# Patient Record
Sex: FEMALE | Race: WHITE | Employment: OTHER | URBAN - METROPOLITAN AREA
[De-identification: names, ages, dates, MRNs, and addresses within clinical notes are randomized per-mention and may not be internally consistent; named-entity substitution may affect disease eponyms.]

---

## 2021-09-01 ENCOUNTER — HOSPITAL ENCOUNTER (OUTPATIENT)
Dept: GENERAL RADIOLOGY | Age: 71
Discharge: HOME | End: 2021-09-01
Attending: FAMILY MEDICINE
Payer: COMMERCIAL

## 2021-09-01 ENCOUNTER — OFFICE VISIT (OUTPATIENT)
Dept: FAMILY MEDICINE CLINIC | Facility: CLINIC | Age: 71
End: 2021-09-01
Payer: COMMERCIAL

## 2021-09-01 VITALS
HEART RATE: 83 BPM | DIASTOLIC BLOOD PRESSURE: 84 MMHG | WEIGHT: 158.63 LBS | OXYGEN SATURATION: 98 % | SYSTOLIC BLOOD PRESSURE: 130 MMHG

## 2021-09-01 DIAGNOSIS — Z01.818 PRE-OPERATIVE GENERAL PHYSICAL EXAMINATION: ICD-10-CM

## 2021-09-01 DIAGNOSIS — Z00.00 ROUTINE MEDICAL EXAM: Primary | ICD-10-CM

## 2021-09-01 DIAGNOSIS — Z12.31 BREAST CANCER SCREENING BY MAMMOGRAM: ICD-10-CM

## 2021-09-01 DIAGNOSIS — E78.5 DYSLIPIDEMIA: ICD-10-CM

## 2021-09-01 DIAGNOSIS — Z12.39 BREAST CANCER SCREENING OTHER THAN MAMMOGRAM: ICD-10-CM

## 2021-09-01 DIAGNOSIS — Z80.3 FAMILY HISTORY OF BREAST CANCER: ICD-10-CM

## 2021-09-01 DIAGNOSIS — Z12.11 SCREENING FOR COLON CANCER: ICD-10-CM

## 2021-09-01 DIAGNOSIS — K21.9 GASTROESOPHAGEAL REFLUX DISEASE WITHOUT ESOPHAGITIS: ICD-10-CM

## 2021-09-01 DIAGNOSIS — Z79.890 HORMONE REPLACEMENT THERAPY (HRT): ICD-10-CM

## 2021-09-01 DIAGNOSIS — R23.8 INTRINSIC AGING OF FACIAL SKIN: ICD-10-CM

## 2021-09-01 DIAGNOSIS — Z78.0 ASYMPTOMATIC MENOPAUSE: ICD-10-CM

## 2021-09-01 DIAGNOSIS — R92.2 DENSE BREAST: ICD-10-CM

## 2021-09-01 DIAGNOSIS — Z13.820 SCREENING FOR OSTEOPOROSIS: ICD-10-CM

## 2021-09-01 DIAGNOSIS — K64.8 OTHER HEMORRHOIDS: ICD-10-CM

## 2021-09-01 DIAGNOSIS — Z79.899 LONG-TERM USE OF HIGH-RISK MEDICATION: ICD-10-CM

## 2021-09-01 LAB
BILIRUB UR QL: NEGATIVE
COLOR UR: YELLOW
GLUCOSE UR-MCNC: NEGATIVE MG/DL
HYALINE CASTS #/AREA URNS AUTO: PRESENT /LPF
KETONES UR-MCNC: NEGATIVE MG/DL
NITRITE UR QL STRIP.AUTO: NEGATIVE
PH UR: 5 [PH] (ref 5–8)
PROT UR-MCNC: 100 MG/DL
RBC #/AREA URNS AUTO: >10 /HPF
SP GR UR STRIP: >1.03 (ref 1–1.03)
UROBILINOGEN UR STRIP-ACNC: <2

## 2021-09-01 PROCEDURE — 87147 CULTURE TYPE IMMUNOLOGIC: CPT | Performed by: FAMILY MEDICINE

## 2021-09-01 PROCEDURE — G0439 PPPS, SUBSEQ VISIT: HCPCS | Performed by: FAMILY MEDICINE

## 2021-09-01 PROCEDURE — 99204 OFFICE O/P NEW MOD 45 MIN: CPT | Performed by: FAMILY MEDICINE

## 2021-09-01 PROCEDURE — 3075F SYST BP GE 130 - 139MM HG: CPT | Performed by: FAMILY MEDICINE

## 2021-09-01 PROCEDURE — 71046 X-RAY EXAM CHEST 2 VIEWS: CPT | Performed by: FAMILY MEDICINE

## 2021-09-01 PROCEDURE — 3079F DIAST BP 80-89 MM HG: CPT | Performed by: FAMILY MEDICINE

## 2021-09-01 PROCEDURE — 87086 URINE CULTURE/COLONY COUNT: CPT | Performed by: FAMILY MEDICINE

## 2021-09-01 PROCEDURE — 81001 URINALYSIS AUTO W/SCOPE: CPT | Performed by: FAMILY MEDICINE

## 2021-09-01 RX ORDER — PANTOPRAZOLE SODIUM 40 MG/1
40 TABLET, DELAYED RELEASE ORAL DAILY
COMMUNITY
Start: 2021-08-23

## 2021-09-01 RX ORDER — ESTRADIOL 0.5 MG/1
0.5 TABLET ORAL DAILY
COMMUNITY
Start: 2021-06-16 | End: 2021-10-06 | Stop reason: ALTCHOICE

## 2021-09-02 NOTE — PROGRESS NOTES
Braulio Forman,    Attached are the results of your recently performed labs/tests:    Noted were:  1. Normal heart and lungs. No acute disease. 2. Atherosclerosis of the aorta. 3. Kyphoscoliosis. 4. Demineralization. 5. Osteoarthritis.    6. Chronic

## 2021-09-03 NOTE — PROGRESS NOTES
Hi Billy Dandy,    Attached are the results of your recently performed labs/tests:    Your urine tests including urine culture came back with an infection. I sent in an antibiotic to your pharmacy. Please: recheck the urine tests in 2 weeks.  If still

## 2021-09-05 NOTE — H&P
HPI:   Patient presents with:  Physical: w/ BE   Pre-Op Exam  Hemorrhoids  Gastro-esophageal Reflux      Conor Bradshaw is a 72year old female who presents for an Annual and  pre-operative physical exam.    Patient will have cosmetic surgery (neck left) w ear pain  MOUTH/THROAT:  No sore throat or dental problems, no oral lesions  HEART:  No chest pain or palpitations  LUNG:  No SOB, cough or wheeze  GI:  No abdominal pain.   No N/V/D/C  :  No dysuria  MS:  No new joint pain or swelling B  NEURO:  no numbn surgery.     Will check:  CBC, CMP, PT/PT  U/A/micro with: pos for infection, antibiotics prescribed, rechk urine clx in 2 weeks-f/u P  EKG  CXR    Patient is an acceptable surgical candidate and is medically cleared for surgery as long as the above tests a or remains reluctant to stop the hormone replacement in the next 2 to 3 months. now- estradiol 0.5 MG Oral Tab; Take 0.5 mg by mouth daily. 10. Long-term use of high-risk medication  SA  - estradiol 0.5 MG Oral Tab; Take 0.5 mg by mouth daily.     11. (CPT=77080)  [facility] CAROLANN 2D+3D SCREENING BILAT (CPT=77067/40832)  US BREAST BILATERAL COMPLETE (QAS=44966-19)  EKG 12-LEAD    Return in about 1 year (around 9/1/2022) for CPE and sooner prn hemorrhoids and GERD.     Susan Pittman MD

## 2021-09-07 ENCOUNTER — TELEPHONE (OUTPATIENT)
Dept: FAMILY MEDICINE CLINIC | Facility: CLINIC | Age: 71
End: 2021-09-07

## 2021-09-07 ENCOUNTER — MED REC SCAN ONLY (OUTPATIENT)
Dept: FAMILY MEDICINE CLINIC | Facility: CLINIC | Age: 71
End: 2021-09-07

## 2021-09-07 NOTE — TELEPHONE ENCOUNTER
Received vm from pt, returning Jane Samuels' call re test results. I spoke with pt, reviewed and discussed urine culture results. Pt is asymptomatic. Said she had a death in the family and is currently in [place].  She did call a provider out there who is sen

## 2021-09-15 ENCOUNTER — HOSPITAL ENCOUNTER (OUTPATIENT)
Dept: BONE DENSITY | Age: 71
Discharge: HOME | End: 2021-09-15
Attending: FAMILY MEDICINE
Payer: COMMERCIAL

## 2021-09-15 ENCOUNTER — HOSPITAL ENCOUNTER (OUTPATIENT)
Dept: MAMMOGRAPHY | Age: 71
Discharge: HOME | End: 2021-09-15
Attending: FAMILY MEDICINE
Payer: COMMERCIAL

## 2021-09-15 DIAGNOSIS — Z78.0 ASYMPTOMATIC MENOPAUSE: ICD-10-CM

## 2021-09-15 DIAGNOSIS — Z12.31 BREAST CANCER SCREENING BY MAMMOGRAM: ICD-10-CM

## 2021-09-15 DIAGNOSIS — Z13.820 SCREENING FOR OSTEOPOROSIS: ICD-10-CM

## 2021-09-15 PROCEDURE — 77063 BREAST TOMOSYNTHESIS BI: CPT | Performed by: FAMILY MEDICINE

## 2021-09-15 PROCEDURE — 77067 SCR MAMMO BI INCL CAD: CPT | Performed by: FAMILY MEDICINE

## 2021-09-15 PROCEDURE — 77080 DXA BONE DENSITY AXIAL: CPT | Performed by: FAMILY MEDICINE

## 2021-09-18 ENCOUNTER — EKG ENCOUNTER (OUTPATIENT)
Dept: LAB | Facility: HOSPITAL | Age: 71
End: 2021-09-18
Attending: FAMILY MEDICINE
Payer: COMMERCIAL

## 2021-09-18 DIAGNOSIS — E78.5 DYSLIPIDEMIA: ICD-10-CM

## 2021-09-18 DIAGNOSIS — Z01.818 PRE-OPERATIVE GENERAL PHYSICAL EXAMINATION: ICD-10-CM

## 2021-09-18 DIAGNOSIS — Z00.00 ROUTINE MEDICAL EXAM: ICD-10-CM

## 2021-09-18 DIAGNOSIS — N30.01 ACUTE CYSTITIS WITH HEMATURIA: ICD-10-CM

## 2021-09-18 LAB
ALBUMIN SERPL-MCNC: 3.9 G/DL (ref 3.4–5)
ALBUMIN/GLOB SERPL: 1 {RATIO} (ref 1–2)
ALP LIVER SERPL-CCNC: 76 U/L
ALT SERPL-CCNC: 17 U/L
ANION GAP SERPL CALC-SCNC: 2 MMOL/L (ref 0–18)
APTT PPP: 28.5 SECONDS (ref 23.2–35.3)
AST SERPL-CCNC: 14 U/L (ref 15–37)
BASOPHILS # BLD AUTO: 0.04 X10(3) UL (ref 0–0.2)
BASOPHILS NFR BLD AUTO: 0.7 %
BILIRUB SERPL-MCNC: 0.8 MG/DL (ref 0.1–2)
BILIRUB UR QL: NEGATIVE
BUN BLD-MCNC: 14 MG/DL (ref 7–18)
BUN/CREAT SERPL: 15.4 (ref 10–20)
CALCIUM BLD-MCNC: 9.4 MG/DL (ref 8.5–10.1)
CHLORIDE SERPL-SCNC: 109 MMOL/L (ref 98–112)
CHOLEST SERPL-MCNC: 219 MG/DL (ref ?–200)
CO2 SERPL-SCNC: 28 MMOL/L (ref 21–32)
COLOR UR: YELLOW
CREAT BLD-MCNC: 0.91 MG/DL
DEPRECATED RDW RBC AUTO: 41.5 FL (ref 35.1–46.3)
EOSINOPHIL # BLD AUTO: 0.07 X10(3) UL (ref 0–0.7)
EOSINOPHIL NFR BLD AUTO: 1.2 %
ERYTHROCYTE [DISTWIDTH] IN BLOOD BY AUTOMATED COUNT: 12.1 % (ref 11–15)
GLOBULIN PLAS-MCNC: 3.9 G/DL (ref 2.8–4.4)
GLUCOSE BLD-MCNC: 91 MG/DL (ref 70–99)
GLUCOSE UR-MCNC: NEGATIVE MG/DL
GRAN CASTS #/AREA URNS LPF: PRESENT /LPF
HCT VFR BLD AUTO: 39.1 %
HDLC SERPL-MCNC: 59 MG/DL (ref 40–59)
HGB BLD-MCNC: 12.7 G/DL
HYALINE CASTS #/AREA URNS AUTO: PRESENT /LPF
IMM GRANULOCYTES # BLD AUTO: 0.01 X10(3) UL (ref 0–1)
IMM GRANULOCYTES NFR BLD: 0.2 %
INR BLD: 1.06 (ref 0.9–1.2)
KETONES UR-MCNC: NEGATIVE MG/DL
LDLC SERPL CALC-MCNC: 138 MG/DL (ref ?–100)
LYMPHOCYTES # BLD AUTO: 1.15 X10(3) UL (ref 1–4)
LYMPHOCYTES NFR BLD AUTO: 19.7 %
MCH RBC QN AUTO: 30.3 PG (ref 26–34)
MCHC RBC AUTO-ENTMCNC: 32.5 G/DL (ref 31–37)
MCV RBC AUTO: 93.3 FL
MONOCYTES # BLD AUTO: 0.35 X10(3) UL (ref 0.1–1)
MONOCYTES NFR BLD AUTO: 6 %
NEUTROPHILS # BLD AUTO: 4.23 X10 (3) UL (ref 1.5–7.7)
NEUTROPHILS # BLD AUTO: 4.23 X10(3) UL (ref 1.5–7.7)
NEUTROPHILS NFR BLD AUTO: 72.2 %
NITRITE UR QL STRIP.AUTO: NEGATIVE
NONHDLC SERPL-MCNC: 160 MG/DL (ref ?–130)
OSMOLALITY SERPL CALC.SUM OF ELEC: 288 MOSM/KG (ref 275–295)
PATIENT FASTING Y/N/NP: YES
PATIENT FASTING Y/N/NP: YES
PH UR: 5 [PH] (ref 5–8)
PLATELET # BLD AUTO: 294 10(3)UL (ref 150–450)
POTASSIUM SERPL-SCNC: 4.4 MMOL/L (ref 3.5–5.1)
PROT SERPL-MCNC: 7.8 G/DL (ref 6.4–8.2)
PROT UR-MCNC: 30 MG/DL
PROTHROMBIN TIME: 13.6 SECONDS (ref 11.8–14.5)
RBC # BLD AUTO: 4.19 X10(6)UL
RBC #/AREA URNS AUTO: >10 /HPF
SODIUM SERPL-SCNC: 139 MMOL/L (ref 136–145)
SP GR UR STRIP: 1.03 (ref 1–1.03)
TRIGL SERPL-MCNC: 122 MG/DL (ref 30–149)
TSI SER-ACNC: 1.53 MIU/ML (ref 0.36–3.74)
UROBILINOGEN UR STRIP-ACNC: 2
VLDLC SERPL CALC-MCNC: 22 MG/DL (ref 0–30)
WBC # BLD AUTO: 5.9 X10(3) UL (ref 4–11)

## 2021-09-18 PROCEDURE — 87186 SC STD MICRODIL/AGAR DIL: CPT

## 2021-09-18 PROCEDURE — 85025 COMPLETE CBC W/AUTO DIFF WBC: CPT

## 2021-09-18 PROCEDURE — 93005 ELECTROCARDIOGRAM TRACING: CPT

## 2021-09-18 PROCEDURE — 87086 URINE CULTURE/COLONY COUNT: CPT

## 2021-09-18 PROCEDURE — 93010 ELECTROCARDIOGRAM REPORT: CPT | Performed by: FAMILY MEDICINE

## 2021-09-18 PROCEDURE — 80053 COMPREHEN METABOLIC PANEL: CPT

## 2021-09-18 PROCEDURE — 36415 COLL VENOUS BLD VENIPUNCTURE: CPT

## 2021-09-18 PROCEDURE — 80061 LIPID PANEL: CPT

## 2021-09-18 PROCEDURE — 85730 THROMBOPLASTIN TIME PARTIAL: CPT

## 2021-09-18 PROCEDURE — 84443 ASSAY THYROID STIM HORMONE: CPT

## 2021-09-18 PROCEDURE — 87077 CULTURE AEROBIC IDENTIFY: CPT

## 2021-09-18 PROCEDURE — 81001 URINALYSIS AUTO W/SCOPE: CPT

## 2021-09-18 PROCEDURE — 85610 PROTHROMBIN TIME: CPT

## 2021-09-21 NOTE — PROGRESS NOTES
Braulio Morse,    Attached are the results of your recently performed labs/tests: All results are essentially within NORMAL limits. EXCEPT:    Your urine tests show a persistent urinary tract infection (E.Coli). I sent in antibiotic for this.  [place]

## 2021-09-24 ENCOUNTER — HOSPITAL ENCOUNTER (OUTPATIENT)
Dept: ULTRASOUND IMAGING | Facility: HOSPITAL | Age: 71
Discharge: HOME | End: 2021-09-24
Attending: FAMILY MEDICINE
Payer: COMMERCIAL

## 2021-09-24 ENCOUNTER — HOSPITAL ENCOUNTER (OUTPATIENT)
Dept: MAMMOGRAPHY | Facility: HOSPITAL | Age: 71
Discharge: HOME | End: 2021-09-24
Attending: FAMILY MEDICINE
Payer: COMMERCIAL

## 2021-09-24 DIAGNOSIS — R92.8 ABNORMAL MAMMOGRAM: ICD-10-CM

## 2021-09-24 PROCEDURE — 76642 ULTRASOUND BREAST LIMITED: CPT | Performed by: FAMILY MEDICINE

## 2021-09-24 PROCEDURE — 77065 DX MAMMO INCL CAD UNI: CPT | Performed by: FAMILY MEDICINE

## 2021-09-24 PROCEDURE — 77061 BREAST TOMOSYNTHESIS UNI: CPT | Performed by: FAMILY MEDICINE

## 2021-09-24 NOTE — IMAGING NOTE
This nurse introduced self and role of breast coordinator. Discussed recommended breast biopsy with patient. Pt was recommended by Dr. Sheri Lea  to have a left  breast ultrasound guided biopsy.  Hx breast reduction with implants in 2018 Had implants with aspect.  There is no detectable internal blood flow.       Limited ultrasound was performed of the left axilla.  There is no visible lymphadenopathy.              Impression   CONCLUSION:     Mass in the upper central left breast with associated architectu Vitamin E, Fish Oil  green tea ,all nsaids like   ibuprofen, Motrin, Advil, Aleve, or other anti-inflammatory medication)   and aspirin 81 mg to be held for 5 days prior to biopsy.   She  Denies usage     -Aspirin (325mg) or 81 mg  aspirin  being taken rela soreness. Tylenol only for discomfort unless they have an allergy to tylenol . Discussed with patient no swimming, bathing,  hot tubs or submerging underwater  for 5 days post procedure until the incision is closed and healed.    Educated patient on [address]

## 2021-09-26 ENCOUNTER — PATIENT MESSAGE (OUTPATIENT)
Dept: FAMILY MEDICINE CLINIC | Facility: CLINIC | Age: 71
End: 2021-09-26

## 2021-09-26 DIAGNOSIS — N63.0 BREAST MASS SEEN ON MAMMOGRAM: Primary | ICD-10-CM

## 2021-09-27 ENCOUNTER — PATIENT MESSAGE (OUTPATIENT)
Dept: FAMILY MEDICINE CLINIC | Facility: CLINIC | Age: 71
End: 2021-09-27

## 2021-09-27 ENCOUNTER — MED REC SCAN ONLY (OUTPATIENT)
Dept: FAMILY MEDICINE CLINIC | Facility: CLINIC | Age: 71
End: 2021-09-27

## 2021-09-27 NOTE — TELEPHONE ENCOUNTER
RECOMMENDATIONS:   ULTRASOUND-GUIDED CORE BIOPSY: LEFT BREAST x1     JEFFERSON CAROLANN 2D+3D DIAGNOSTIC ADDL VWS LEFT (CPT=77065/16558): Result Notes     Magaly Arauz MD   9/26/2021  4:55 PM CDT         Braulio Cooper,      I reviewed your Mammogram resul

## 2021-09-29 ENCOUNTER — HOSPITAL ENCOUNTER (OUTPATIENT)
Dept: ULTRASOUND IMAGING | Facility: HOSPITAL | Age: 71
Discharge: HOME | End: 2021-09-29
Attending: FAMILY MEDICINE
Payer: COMMERCIAL

## 2021-09-29 ENCOUNTER — HOSPITAL ENCOUNTER (OUTPATIENT)
Dept: MAMMOGRAPHY | Facility: HOSPITAL | Age: 71
Discharge: HOME | End: 2021-09-29
Attending: FAMILY MEDICINE
Payer: COMMERCIAL

## 2021-09-29 DIAGNOSIS — N63.20 MASS OF LEFT BREAST: ICD-10-CM

## 2021-09-29 DIAGNOSIS — N63.0 BREAST MASS SEEN ON MAMMOGRAM: ICD-10-CM

## 2021-09-29 PROCEDURE — 19083 BX BREAST 1ST LESION US IMAG: CPT | Performed by: FAMILY MEDICINE

## 2021-09-29 PROCEDURE — 77065 DX MAMMO INCL CAD UNI: CPT | Performed by: FAMILY MEDICINE

## 2021-09-29 PROCEDURE — 88305 TISSUE EXAM BY PATHOLOGIST: CPT | Performed by: FAMILY MEDICINE

## 2021-09-29 PROCEDURE — 88360 TUMOR IMMUNOHISTOCHEM/MANUAL: CPT | Performed by: FAMILY MEDICINE

## 2021-09-29 NOTE — PROCEDURES
[facility]  Procedure Note    Carri Mini Patient Status:  Outpatient    1950 MRN [id number]   Location [address] Attending [place] [facility] Day # 0 PCP Zamzam Ascencio,

## 2021-09-30 ENCOUNTER — TELEPHONE (OUTPATIENT)
Dept: FAMILY MEDICINE CLINIC | Facility: CLINIC | Age: 71
End: 2021-09-30

## 2021-09-30 ENCOUNTER — TELEPHONE (OUTPATIENT)
Dept: HEMATOLOGY/ONCOLOGY | Facility: HOSPITAL | Age: 71
End: 2021-09-30

## 2021-09-30 DIAGNOSIS — C50.919 INFILTRATING DUCTAL CARCINOMA (HCC): Primary | ICD-10-CM

## 2021-09-30 NOTE — TELEPHONE ENCOUNTER
Notified patient of positive breast biopsy results for infiltrating ductal carcinoma. Will place referrals for breast surgeon and oncologist and notify patient via [facility] message. All questions were answered. Will forward this to Dr. Alee Correa as Lexie Sensor.

## 2021-09-30 NOTE — TELEPHONE ENCOUNTER
Patient is s/p left breast biopsy, performed 9/29/21.   Pathology is as follows:    Left breast, 12 o'clock, 5 cm from nipple, cores x5; ultrasound-guided core biopsy:   · Infiltrating ductal carcinoma, SBR grade 2.  · Largest continuous tumor focus measure

## 2021-10-04 ENCOUNTER — LAB ENCOUNTER (OUTPATIENT)
Dept: LAB | Facility: HOSPITAL | Age: 71
End: 2021-10-04
Attending: FAMILY MEDICINE
Payer: COMMERCIAL

## 2021-10-04 DIAGNOSIS — N39.0 URINARY TRACT INFECTION WITHOUT HEMATURIA, SITE UNSPECIFIED: ICD-10-CM

## 2021-10-04 PROCEDURE — 81001 URINALYSIS AUTO W/SCOPE: CPT

## 2021-10-05 NOTE — PROGRESS NOTES
Braulio Larson,    Attached are the results of your recently performed labs/tests:    Your urinalysis has improved. There is only a small amount of blood noted. Please:   Recheck as below: The urine test in one month. Follow up:  Pending the results.

## 2021-10-06 ENCOUNTER — OFFICE VISIT (OUTPATIENT)
Dept: HEMATOLOGY/ONCOLOGY | Facility: HOSPITAL | Age: 71
End: 2021-10-06
Attending: INTERNAL MEDICINE
Payer: COMMERCIAL

## 2021-10-06 ENCOUNTER — NURSE NAVIGATOR ENCOUNTER (OUTPATIENT)
Dept: HEMATOLOGY/ONCOLOGY | Facility: HOSPITAL | Age: 71
End: 2021-10-06

## 2021-10-06 ENCOUNTER — OFFICE VISIT (OUTPATIENT)
Dept: SURGERY | Facility: CLINIC | Age: 71
End: 2021-10-06
Payer: COMMERCIAL

## 2021-10-06 VITALS
BODY MASS INDEX: 26.16 KG/M2 | WEIGHT: 157 LBS | OXYGEN SATURATION: 97 % | TEMPERATURE: 99 F | DIASTOLIC BLOOD PRESSURE: 78 MMHG | RESPIRATION RATE: 16 BRPM | SYSTOLIC BLOOD PRESSURE: 120 MMHG | HEART RATE: 69 BPM | HEIGHT: 65 IN

## 2021-10-06 VITALS
HEIGHT: 65 IN | DIASTOLIC BLOOD PRESSURE: 78 MMHG | WEIGHT: 157 LBS | TEMPERATURE: 99 F | HEART RATE: 69 BPM | BODY MASS INDEX: 26.16 KG/M2 | SYSTOLIC BLOOD PRESSURE: 120 MMHG | RESPIRATION RATE: 16 BRPM | OXYGEN SATURATION: 97 %

## 2021-10-06 DIAGNOSIS — M85.80 OSTEOPENIA, UNSPECIFIED LOCATION: ICD-10-CM

## 2021-10-06 DIAGNOSIS — Z17.0 MALIGNANT NEOPLASM OF CENTRAL PORTION OF LEFT BREAST IN FEMALE, ESTROGEN RECEPTOR POSITIVE (HCC): Primary | ICD-10-CM

## 2021-10-06 DIAGNOSIS — C50.112 MALIGNANT NEOPLASM OF CENTRAL PORTION OF LEFT BREAST IN FEMALE, ESTROGEN RECEPTOR POSITIVE (HCC): Primary | ICD-10-CM

## 2021-10-06 PROCEDURE — 3074F SYST BP LT 130 MM HG: CPT | Performed by: INTERNAL MEDICINE

## 2021-10-06 PROCEDURE — 3078F DIAST BP <80 MM HG: CPT | Performed by: INTERNAL MEDICINE

## 2021-10-06 PROCEDURE — 3008F BODY MASS INDEX DOCD: CPT | Performed by: INTERNAL MEDICINE

## 2021-10-06 PROCEDURE — 3008F BODY MASS INDEX DOCD: CPT | Performed by: SURGERY

## 2021-10-06 PROCEDURE — 3074F SYST BP LT 130 MM HG: CPT | Performed by: SURGERY

## 2021-10-06 PROCEDURE — 99205 OFFICE O/P NEW HI 60 MIN: CPT | Performed by: SURGERY

## 2021-10-06 PROCEDURE — 3078F DIAST BP <80 MM HG: CPT | Performed by: SURGERY

## 2021-10-06 PROCEDURE — 99245 OFF/OP CONSLTJ NEW/EST HI 55: CPT | Performed by: INTERNAL MEDICINE

## 2021-10-06 NOTE — PATIENT INSTRUCTIONS
Dr. Pozo Rad  Tel: [phone]  Fax: [phone]  [address] [facility] [address] [place], [place] [address] [phone]     Surgery/Procedure: Left (possible bilateral) nipple verses skin sparing mastectomy, left lymphoscintigraphy the earliest this can be done is 72 hours prior to surgery. 10. The Pre-Admission Testing Department will call the day before to confirm your procedure, give you the time you need to arrive by and directions on where to go.  They begin making calls after 2

## 2021-10-06 NOTE — PROGRESS NOTES
Patient presents to the [facility] for consultation with both Dr. Armando Norris and Dr. Archie Jimenez. Patient is accompanied by her spouse Sherry Horn for support. Introduced myself to patient and her spouse as Breast RN Navigator.   Shared my role to provide support and

## 2021-10-06 NOTE — CONSULTS
SERGIO Keating is a 72year old female here at the request of Maria Fernanda Perez MD for evaluation of Malignant neoplasm of central portion of left breast in female, estrogen receptor positive (hcc)  (primary encounter diagnosis)    Patient pres cancer:  postmenopausal  Menses age 16. Menopause at age 55, natural menopause.  first at age 24   Breast fed 4 months cumulative remote. OCP use in the past:  Yes. Cumulative number of years: On and off for 10 yrs.   HRT use:  Yes, phytoestrogen File    Past Medical History:   Diagnosis Date   • Cervical dysplasia 1993     Past Surgical History:   Procedure Laterality Date   • CONIZATION CERVIX,KNIFE/LASER  1993   • JEFFERSON LOCALIZATION WIRE 1 SITE LEFT (CPT=19281)  1997   • OTHER SURGICAL HISTORY Malignant neoplasm of central portion of left breast in female, estrogen receptor positive ([facility] [address]  (primary encounter diagnosis)     Cancer Staging  Malignant neoplasm of central portion of left breast in female, estrogen receptor positive ([address]  Staging her risk of recurrence. Baseline DEXA in Sept 2021 with osteopenia. Vitamin D levels ordered. Will have repeat DEXA in Sept 2023. All of the patient's questions were answered to the best of my abilities.      Case will be presented at [place] [address] [facility]                                                                       Pathologist:           Sita Saul MD                                                        Specimen:    Breast, left, l ASCO-CAP Guideline (2020). Final Diagnosis:--      Left breast, 12 o'clock, 5 cm from nipple, cores x5; ultrasound-guided core biopsy:   · Infiltrating ductal carcinoma, SBR grade 2.  · Largest continuous tumor focus measures 9 mm.      Comment:  For quali monoclonal, clone CB11) status: 1+  (Negative)  Ki-67 (Leica, monoclonal, clone MM1) status: 9% (Favorable)        ASCO/CAP Scoring Criteria:  ER/PgR:    > 1% (Positive), Intensity of staining (weak, moderate, strong)  <1% (Negative) Internal control prese ribbon clip. \" The specimen consists of five fibrofatty tissue cores measuring in aggregate 1.5 x 0.6 x 0.2 cm. The specimen is submitted entirely in cassette A1. The specimen was collected and processed on 9/29/21.  The specimen was obtained from the [facility] the upper central left breast with ill-defined margins. There is associated architectural distortion. This mass is located about 5 cm from the nipple on the full field mediolateral view.   This mass is new from    the 2017 outside screening mammogram, whi received a discharge summary from the technologist after completion of exam.      Breast marker legend used on images    Triangle = Palpable lump   [place] = Skin tag or mole   BB = Nipple   Linear melony = Scar   Square = Pain            Dictated by (CST): B COMPOSITION:   Category b-Scattered areas fibroglandular density. FINDINGS: Bilateral silicone implants are noted.   There is a mass in the upper-outer quadrant of the left breast.  Dedicated left breast imaging is recommended for complete evaluatio Measurement of bone mineral density of the lumbar spine and hip was performed on a Hologic dual energy x-ray absorptiometry scanner.       FINDINGS:      LEFT FEMORAL NECK   BMD: 0.597 gm/sq. cm. T SCORE: -2.3 Z SCORE: -0.5      LEFT TOTAL HIP   BMD: 0.840 9/16/2021 at 4:22 PM

## 2021-10-07 ENCOUNTER — GENETICS ENCOUNTER (OUTPATIENT)
Dept: GENETICS | Facility: HOSPITAL | Age: 71
End: 2021-10-07
Payer: COMMERCIAL

## 2021-10-07 ENCOUNTER — NURSE ONLY (OUTPATIENT)
Dept: HEMATOLOGY/ONCOLOGY | Facility: HOSPITAL | Age: 71
End: 2021-10-07
Payer: COMMERCIAL

## 2021-10-07 DIAGNOSIS — Z17.0 MALIGNANT NEOPLASM OF CENTRAL PORTION OF LEFT BREAST IN FEMALE, ESTROGEN RECEPTOR POSITIVE (HCC): Primary | ICD-10-CM

## 2021-10-07 DIAGNOSIS — Z80.0 FAMILY HISTORY OF COLON CANCER: ICD-10-CM

## 2021-10-07 DIAGNOSIS — Z80.3 FAMILY HISTORY OF BREAST CANCER: ICD-10-CM

## 2021-10-07 DIAGNOSIS — C50.112 MALIGNANT NEOPLASM OF CENTRAL PORTION OF LEFT BREAST IN FEMALE, ESTROGEN RECEPTOR POSITIVE (HCC): Primary | ICD-10-CM

## 2021-10-07 DIAGNOSIS — M85.80 OSTEOPENIA, UNSPECIFIED LOCATION: ICD-10-CM

## 2021-10-07 DIAGNOSIS — Z80.9 FAMILY HISTORY OF CANCER: ICD-10-CM

## 2021-10-07 DIAGNOSIS — Z80.41 FAMILY HISTORY OF OVARIAN CANCER: ICD-10-CM

## 2021-10-07 PROCEDURE — 82306 VITAMIN D 25 HYDROXY: CPT

## 2021-10-07 PROCEDURE — 96040 HC GENETIC COUNSELING EA 30 MIN: CPT

## 2021-10-07 PROCEDURE — 36415 COLL VENOUS BLD VENIPUNCTURE: CPT

## 2021-10-07 RX ORDER — ERGOCALCIFEROL 1.25 MG/1
50000 CAPSULE ORAL WEEKLY
Qty: 4 CAPSULE | Refills: 2 | Status: SHIPPED | OUTPATIENT
Start: 2021-10-07

## 2021-10-07 NOTE — PROGRESS NOTES
Patient Name: Raad Antonio  YOB: 1950  Date of Visit: 10/7/2021    Reason for visit: Ms. Faisal Kramer was seen for the purposes of genetic counseling due to her recent diagnosis of breast cancer at age 72 and a family history of breat, ovarian, col with a h/o lung cancer dx in her late 46s and a h/o smoking. Her maternal grandmother  at 80y with a reported h/o ovarian cancer dx in her 62s. Her maternal grandfather  in his 62s due to an infection after surgery with no cancer history.     MsMiladys Jj Claire Mutations in BRCA1 increase risks for male breast cancer, prostate cancer, and pancreatic as well. This gene is acts as a tumor suppressor gene.  If at least one BRCA1 gene is functioning normally, an individual will be prevented from developing malignant identifying a mutation. Other genes that have been identified to be responsible for breast cancer include JOSE, BRIP1, CHEK2, PALB2, PTEN, RAD50, and TP53. These other genes account for a smaller percentage of hereditary breast cancers.        If testing is expected for individuals in the general population. It should be emphasized that absence of a mutation in an at-risk individual would not eliminate their risk for cancer, but simply return them to the risk expected for the general population.  If no affect family members will depend the above genetic testing results.       Send to: Hemant/Joe/Mahad/Carlos Eduardo/Maria Del Carmen  Time spent with patient: 45 minutes

## 2021-10-08 ENCOUNTER — OFFICE VISIT (OUTPATIENT)
Dept: SURGERY | Facility: CLINIC | Age: 71
End: 2021-10-08
Payer: COMMERCIAL

## 2021-10-08 VITALS
RESPIRATION RATE: 16 BRPM | BODY MASS INDEX: 26.31 KG/M2 | HEART RATE: 91 BPM | HEIGHT: 64.7 IN | WEIGHT: 156 LBS | SYSTOLIC BLOOD PRESSURE: 136 MMHG | DIASTOLIC BLOOD PRESSURE: 78 MMHG | OXYGEN SATURATION: 97 %

## 2021-10-08 DIAGNOSIS — Z17.0 MALIGNANT NEOPLASM OF CENTRAL PORTION OF LEFT BREAST IN FEMALE, ESTROGEN RECEPTOR POSITIVE (HCC): Primary | ICD-10-CM

## 2021-10-08 DIAGNOSIS — C50.112 MALIGNANT NEOPLASM OF CENTRAL PORTION OF LEFT BREAST IN FEMALE, ESTROGEN RECEPTOR POSITIVE (HCC): Primary | ICD-10-CM

## 2021-10-08 PROCEDURE — 3008F BODY MASS INDEX DOCD: CPT | Performed by: SURGERY

## 2021-10-08 PROCEDURE — 99243 OFF/OP CNSLTJ NEW/EST LOW 30: CPT | Performed by: SURGERY

## 2021-10-08 PROCEDURE — 3078F DIAST BP <80 MM HG: CPT | Performed by: SURGERY

## 2021-10-08 PROCEDURE — 3075F SYST BP GE 130 - 139MM HG: CPT | Performed by: SURGERY

## 2021-10-08 NOTE — PATIENT INSTRUCTIONS
Surgeon:         Dr. Ascencion Burrows                                        Tel:         [phone]                                  Fax:        [phone]    Surgery/Procedure:      Immediate breast reconstruction with placement of left, possible bilater

## 2021-10-08 NOTE — CONSULTS
New Patient Consultation    This is the first visit for this 72year old female who presents to discuss reconstructive options following surgery for breast cancer. History of Present Illness:    The patient is a 72year old female who presents with a lef • Renal Disease Father    • Breast Cancer Paternal Grandmother 71   • Skin cancer Sister         not melanoma   • Other (lymphoma) Niece 25   • Colon Cancer Paternal Cousin Male         dx >50   • Colon Cancer Paternal Cousin Male         dx >50   • Drums retaining urine, painful urination, urinary incontinence, decreased urine stream, +blood in urine, or vaginal/penile discharge.     Skin:  Then patient denies rash, itching, skin lesions, dry skin, change in skin color or change in moles, sunburn with blist no nipple retraction or discharge. There are no dominant masses in the breast.  The right breast capsule is soft. Left breast:  Grade 1 ptosis is noted. Striae are absent. Well-healed machado pattern scars are noted.   Mild lateral ecchymosis is noted co matrix in  breast reconstruction is considered an \"off label\" use. The expansion process, as well as the need for a secondary procedure for placement of a permanent implant, were reviewed.   Representative illustrations and photographs were demonstrated

## 2021-10-11 ENCOUNTER — NURSE NAVIGATOR ENCOUNTER (OUTPATIENT)
Dept: HEMATOLOGY/ONCOLOGY | Facility: HOSPITAL | Age: 71
End: 2021-10-11

## 2021-10-11 NOTE — PROGRESS NOTES
Called patient back in regards to her voicemail she left to sign up for the pre-op mastectomy class for this Wednesday.  Patient stated that she is an [facility] patient of 's and Dr. Pena Files and decided to go through with the mastectomy surgery ins

## 2021-10-12 ENCOUNTER — NURSE NAVIGATOR ENCOUNTER (OUTPATIENT)
Dept: HEMATOLOGY/ONCOLOGY | Facility: HOSPITAL | Age: 71
End: 2021-10-12

## 2021-10-12 ENCOUNTER — TELEPHONE (OUTPATIENT)
Dept: HEMATOLOGY/ONCOLOGY | Facility: HOSPITAL | Age: 71
End: 2021-10-12

## 2021-10-12 NOTE — PROGRESS NOTES
LMOVMTCB regarding scheduling for the pre-op mastectomy class for tomorrow October 13, 2021 at 4:30pm at the [facility]. Instructed patient on location and the breast nurse navigator contact information to call back.     Awaiting phone call ba

## 2021-10-12 NOTE — TELEPHONE ENCOUNTER
Phoned patient for continues care coordination. Patient was seen by Dr. Nicole Buchanan for consultation on 10/8.   Patient has decided her preference to have a left nipple sparing mastectomy with reconstruction on her affected breast.  Should her genetics indicate

## 2021-10-12 NOTE — PROGRESS NOTES
Patient called and left voicemail stating she didn't realize that she was calling the wrong navigator at [facility] and did not want to attend the pre-op mastectomy class and it was canceled for tomorrow October 13, 2021.

## 2021-10-13 ENCOUNTER — APPOINTMENT (OUTPATIENT)
Dept: HEMATOLOGY/ONCOLOGY | Facility: HOSPITAL | Age: 71
End: 2021-10-13
Attending: SURGERY
Payer: COMMERCIAL

## 2021-10-15 ENCOUNTER — OFFICE VISIT (OUTPATIENT)
Dept: FAMILY MEDICINE CLINIC | Facility: CLINIC | Age: 71
End: 2021-10-15
Payer: COMMERCIAL

## 2021-10-15 ENCOUNTER — TELEPHONE (OUTPATIENT)
Dept: SURGERY | Facility: CLINIC | Age: 71
End: 2021-10-15

## 2021-10-15 ENCOUNTER — TELEPHONE (OUTPATIENT)
Dept: FAMILY MEDICINE CLINIC | Facility: CLINIC | Age: 71
End: 2021-10-15

## 2021-10-15 VITALS
SYSTOLIC BLOOD PRESSURE: 128 MMHG | DIASTOLIC BLOOD PRESSURE: 62 MMHG | BODY MASS INDEX: 26.65 KG/M2 | HEART RATE: 72 BPM | WEIGHT: 158 LBS | OXYGEN SATURATION: 98 % | HEIGHT: 64.7 IN

## 2021-10-15 DIAGNOSIS — C50.919 INFILTRATING DUCTAL CARCINOMA (HCC): ICD-10-CM

## 2021-10-15 DIAGNOSIS — Z01.818 PRE-OPERATIVE GENERAL PHYSICAL EXAMINATION: Primary | ICD-10-CM

## 2021-10-15 DIAGNOSIS — Z79.899 LONG-TERM USE OF HIGH-RISK MEDICATION: ICD-10-CM

## 2021-10-15 DIAGNOSIS — E55.9 VITAMIN D DEFICIENCY: ICD-10-CM

## 2021-10-15 DIAGNOSIS — K21.9 GASTROESOPHAGEAL REFLUX DISEASE WITHOUT ESOPHAGITIS: ICD-10-CM

## 2021-10-15 DIAGNOSIS — C50.112 MALIGNANT NEOPLASM OF CENTRAL PORTION OF LEFT BREAST IN FEMALE, ESTROGEN RECEPTOR POSITIVE (HCC): Primary | ICD-10-CM

## 2021-10-15 DIAGNOSIS — R05.9 COUGH: ICD-10-CM

## 2021-10-15 DIAGNOSIS — G47.09 OTHER INSOMNIA: ICD-10-CM

## 2021-10-15 DIAGNOSIS — Z17.0 MALIGNANT NEOPLASM OF CENTRAL PORTION OF LEFT BREAST IN FEMALE, ESTROGEN RECEPTOR POSITIVE (HCC): Primary | ICD-10-CM

## 2021-10-15 PROCEDURE — 3078F DIAST BP <80 MM HG: CPT | Performed by: FAMILY MEDICINE

## 2021-10-15 PROCEDURE — 99244 OFF/OP CNSLTJ NEW/EST MOD 40: CPT | Performed by: FAMILY MEDICINE

## 2021-10-15 PROCEDURE — 3008F BODY MASS INDEX DOCD: CPT | Performed by: FAMILY MEDICINE

## 2021-10-15 PROCEDURE — 3074F SYST BP LT 130 MM HG: CPT | Performed by: FAMILY MEDICINE

## 2021-10-15 RX ORDER — ESZOPICLONE 2 MG/1
2 TABLET, FILM COATED ORAL NIGHTLY
Qty: 30 TABLET | Refills: 1 | Status: SHIPPED
Start: 2021-10-15

## 2021-10-15 NOTE — H&P
HPI:   Patient presents with:  Pre-Op Exam: surgery 10/28  Breast Cancer  Insomnia      Jazmin Wen is a 72year old female who presents for a pre-operative physical exam.    Patient will have:  Bilateral mastectomy with immediate breast reconstruction • Breast Cancer Sister 47        metastatic at time of diagnosis   • Ovarian Cancer Maternal Grandmother         late 63's or early 66's, lived to age 80   • No Known Problems Mother    • Renal Disease Father    • Breast Cancer Paternal Grandmother 71 to auscultation B, no wheezing and no rhonchi B   CARDIO: RRR without murmur, NL S1 S2, no S3 S4  EXT: no edema, Brachial, PT and DP pulses WNL B UE/LE  GI: NABS, soft, nodistended,no masses, no HSM or tenderness  MS: grossly NL B UE/LE, no significant jolly exacerbation, trial of Lunesta as below, follow-up with me in 1 month or sooner as needed, my chart message update okay    5. Long-term use of high-risk medication  We have discussed the proper use, risks and benefits of her medication(s).  The patient (and

## 2021-10-15 NOTE — TELEPHONE ENCOUNTER
Calling pt in regards to scheduling surgery. Informed pt that I have 10/28/21 available at [facility] with Dr. Luz Love and Dr. Yokasta Mcdaniel. Pt verbalized understanding and in agreement with date and location. All questions answered.    Encouraged pt to

## 2021-10-18 ENCOUNTER — GENETICS ENCOUNTER (OUTPATIENT)
Dept: HEMATOLOGY/ONCOLOGY | Facility: HOSPITAL | Age: 71
End: 2021-10-18

## 2021-10-18 ENCOUNTER — LAB ENCOUNTER (OUTPATIENT)
Dept: LAB | Facility: HOSPITAL | Age: 71
End: 2021-10-18
Attending: FAMILY MEDICINE
Payer: COMMERCIAL

## 2021-10-18 ENCOUNTER — HOSPITAL ENCOUNTER (OUTPATIENT)
Dept: GENERAL RADIOLOGY | Facility: HOSPITAL | Age: 71
Discharge: HOME | End: 2021-10-18
Attending: FAMILY MEDICINE
Payer: COMMERCIAL

## 2021-10-18 DIAGNOSIS — R05.9 COUGH: ICD-10-CM

## 2021-10-18 DIAGNOSIS — N39.0 RECURRENT URINARY TRACT INFECTION: ICD-10-CM

## 2021-10-18 DIAGNOSIS — R31.29 OTHER MICROSCOPIC HEMATURIA: ICD-10-CM

## 2021-10-18 DIAGNOSIS — Z01.818 PRE-OPERATIVE GENERAL PHYSICAL EXAMINATION: ICD-10-CM

## 2021-10-18 PROCEDURE — 71046 X-RAY EXAM CHEST 2 VIEWS: CPT | Performed by: FAMILY MEDICINE

## 2021-10-18 PROCEDURE — 85610 PROTHROMBIN TIME: CPT

## 2021-10-18 PROCEDURE — 80053 COMPREHEN METABOLIC PANEL: CPT

## 2021-10-18 PROCEDURE — 36415 COLL VENOUS BLD VENIPUNCTURE: CPT

## 2021-10-18 PROCEDURE — 85025 COMPLETE CBC W/AUTO DIFF WBC: CPT

## 2021-10-18 PROCEDURE — 81015 MICROSCOPIC EXAM OF URINE: CPT

## 2021-10-18 PROCEDURE — 85730 THROMBOPLASTIN TIME PARTIAL: CPT

## 2021-10-18 PROCEDURE — 87086 URINE CULTURE/COLONY COUNT: CPT

## 2021-10-18 NOTE — PROGRESS NOTES
Providers: MD Michelle Martinez MD Eppie Cools, MD Beecher Edis, MD Daniel Moss, MS, [facility]    Reason for Referral:  Raad Antonio had genetic testing performed on 10/7/21 because of a new diagnosis of breast cancer.      G phone on 10/29/2021. The etiology of Ms. Joe Us cancer remains unexplained. The limitations of the testing include the chance that a pathogenic variant in a gene other than those included in this panel might be the cause of cancer in Ms. Bhakta or her

## 2021-10-19 NOTE — PROGRESS NOTES
Breast Surgery New Patient Consultation    This is the first visit for this 72year old woman, referred by Dr. Jabari Meyers, who presents for evaluation of breast cancer.     History of Present Illness:   Ms. Mariam Felipe is a 72year old woman who present history of oral contraceptive use for unknown years, last unknown years ago. She denies infertility treatment to achieve pregnancy. Medications:    pantoprazole 40 MG Oral Tab EC, Take 40 mg by mouth daily. , Disp: , Rfl:         Allergies:    No Known of chest pain, chest pressure/discomfort, palpitations, irregular heartbeat, fainting or near-fainting, difficulty breathing when lying flat, SOB/Coughing at night, swelling of the legs or chest pain while walking.     Breasts:  See history of present illne Wt 71.2 kg (157 lb)   SpO2 97%   BMI 26.13 kg/m²     Physical Exam:  The patient is an alert, oriented, well-nourished and  well-developed woman who appears her stated age. Her speech patterns and movements are normal. Her affect is appropriate.     HEENT central portion of left breast in female, estrogen receptor positive ([facility] [address]  Staging form: Breast, AJCC 8th Edition  - Clinical stage from 10/6/2021: Stage IA (cT1c, cN0, cM0, G1, ER+, IL+, HER2-) - Signed by Francisco Bae MD on 10/6/2021    Discussion an radiation on this. She is leaning towards a lumpectomy with sentinel lymph node biopsy if genetic testing results are negative.   I offered her the ability to discuss with plastic surgery upfront should she be interested in discussing possible radiation re

## 2021-10-20 ENCOUNTER — OFFICE VISIT (OUTPATIENT)
Dept: PHYSICAL THERAPY | Facility: HOSPITAL | Age: 71
End: 2021-10-20
Attending: SURGERY
Payer: COMMERCIAL

## 2021-10-20 DIAGNOSIS — C50.112 MALIGNANT NEOPLASM OF CENTRAL PORTION OF LEFT BREAST IN FEMALE, ESTROGEN RECEPTOR POSITIVE (HCC): ICD-10-CM

## 2021-10-20 DIAGNOSIS — Z17.0 MALIGNANT NEOPLASM OF CENTRAL PORTION OF LEFT BREAST IN FEMALE, ESTROGEN RECEPTOR POSITIVE (HCC): ICD-10-CM

## 2021-10-20 NOTE — PROGRESS NOTES
BREAST CANCER SURGICAL SCREENINGS  [facility]      PATIENT SUMMARY:     Involved Side:  LEFT                                                   Dominant Hand:     RIGHT                             Occupation:      Retired flex 5 5   flex     flex      abd 5 5   abd     abd      ext 5 5   ext     ext      ER 5 5   ER     ER      IR 5 5   IR     IR                     Posture: GOOD    Posture:     Posture:                      Cording (grade 0-3):     Cording (grade 0-3):

## 2021-10-21 NOTE — PROGRESS NOTES
Braulio Gonsalez,    Attached are the results of your recently performed labs/tests: All results are essentially within NORMAL limits.     EXCEPT:    Your glucose is minimally elevated, consistent with a nonfasting test.  Let me know otherwise  Your u

## 2021-10-21 NOTE — PROGRESS NOTES
Braulio King,    Attached are the results of your recently performed labs/tests: All results are essentially within NORMAL limits.     Take care,     Meme Quiroz MD  10/21/2021    (Report(s) are attached, future lab/test orders or any

## 2021-10-26 ENCOUNTER — TELEPHONE (OUTPATIENT)
Dept: HEMATOLOGY/ONCOLOGY | Facility: HOSPITAL | Age: 71
End: 2021-10-26

## 2021-10-26 ENCOUNTER — TELEPHONE (OUTPATIENT)
Dept: SURGERY | Facility: CLINIC | Age: 71
End: 2021-10-26

## 2021-10-26 ENCOUNTER — LAB ENCOUNTER (OUTPATIENT)
Dept: LAB | Facility: HOSPITAL | Age: 71
End: 2021-10-26
Attending: SURGERY
Payer: COMMERCIAL

## 2021-10-26 ENCOUNTER — HOSPITAL ENCOUNTER (OUTPATIENT)
Dept: NUCLEAR MEDICINE | Facility: HOSPITAL | Age: 71
End: 2021-10-26
Payer: COMMERCIAL

## 2021-10-26 DIAGNOSIS — Z01.818 PREOP TESTING: ICD-10-CM

## 2021-10-26 NOTE — TELEPHONE ENCOUNTER
----- Message from Bradly Bhakta sent at 10/26/2021 12:17 PM CDT -----  Regarding: Breast surgery  Hi,  Bit of confusion here at the main diagnostic at the [facility]. I am here as I was scheduled with nuclear medicine at 12 today and now it appea

## 2021-10-26 NOTE — TELEPHONE ENCOUNTER
Patient is scheduled on 10/28/21 for bilateral nipple versus skin sparing mastectomy with left slnb, and immediate reconstruction with tissue expander placement. Phoned patient to reinforced preoperative education and address questions or concerns.     E

## 2021-10-26 NOTE — TELEPHONE ENCOUNTER
Returned pt phone call regarding the scheduling problem with Nuc med. Informed pt that this was incorrect to be scheduled today and this will be done the day of surgery. Also informed pt that I need to move her post-op appt.   Informed pt that we can see

## 2021-10-27 ENCOUNTER — TELEPHONE (OUTPATIENT)
Dept: SURGERY | Facility: CLINIC | Age: 71
End: 2021-10-27

## 2021-10-27 ENCOUNTER — TELEPHONE (OUTPATIENT)
Dept: HEMATOLOGY/ONCOLOGY | Facility: HOSPITAL | Age: 71
End: 2021-10-27

## 2021-10-27 NOTE — PAT NURSING NOTE
Spoke with RN at DR. [address] office regarding patient's cardiac clearance. Cardiology office visit scanned in under media tab in patient chart. Cardiologist to amend note to include clearance.

## 2021-10-27 NOTE — PAT NURSING NOTE
Message left with RN line at Dr. Earl Henriquez office regarding cardiac clearance for patient as noted in medical clearance.

## 2021-10-27 NOTE — TELEPHONE ENCOUNTER
Call received from patient's spouse Oumar Muñoz, expressing concerns. He shares patient is worried regarding calls received that relate to cardiac clearance for her scheduled surgery tomorrow.   Spouse shares patient's feedback that she feels responsible for pl

## 2021-10-27 NOTE — TELEPHONE ENCOUNTER
Call received from patient. Patient expresses worry.   She shares she received a call from the office of Dr. Mike Webster, sharing that they had not received a letter of medical clearance from her Cardiologist, and that this was needed prior to her surgery cali

## 2021-10-27 NOTE — TELEPHONE ENCOUNTER
Patient was called to see if she had completed her cardiac clearance. Patient was unable to tell me if she had seen the doctor. Patient thought that her PCP was taking care of it. I asked the patient to reach out to her Cardiologist as well I would call.  I

## 2021-10-28 ENCOUNTER — HOSPITAL ENCOUNTER (OUTPATIENT)
Facility: HOSPITAL | Age: 71
Discharge: HOME | End: 2021-10-29
Attending: SURGERY | Admitting: SURGERY
Payer: COMMERCIAL

## 2021-10-28 ENCOUNTER — ANESTHESIA (OUTPATIENT)
Dept: SURGERY | Facility: HOSPITAL | Age: 71
End: 2021-10-28
Payer: COMMERCIAL

## 2021-10-28 ENCOUNTER — HOSPITAL ENCOUNTER (OUTPATIENT)
Dept: NUCLEAR MEDICINE | Facility: HOSPITAL | Age: 71
Discharge: HOME | End: 2021-10-28
Attending: SURGERY
Payer: COMMERCIAL

## 2021-10-28 ENCOUNTER — ANESTHESIA EVENT (OUTPATIENT)
Dept: SURGERY | Facility: HOSPITAL | Age: 71
End: 2021-10-28
Payer: COMMERCIAL

## 2021-10-28 DIAGNOSIS — C50.112 MALIGNANT NEOPLASM OF CENTRAL PORTION OF LEFT BREAST IN FEMALE, ESTROGEN RECEPTOR POSITIVE (HCC): ICD-10-CM

## 2021-10-28 DIAGNOSIS — Z17.0 MALIGNANT NEOPLASM OF CENTRAL PORTION OF LEFT BREAST IN FEMALE, ESTROGEN RECEPTOR POSITIVE (HCC): ICD-10-CM

## 2021-10-28 DIAGNOSIS — Z01.818 PREOP TESTING: Primary | ICD-10-CM

## 2021-10-28 PROCEDURE — 0HTV0ZZ RESECTION OF BILATERAL BREAST, OPEN APPROACH: ICD-10-PCS | Performed by: SURGERY

## 2021-10-28 PROCEDURE — 0HHV0NZ INSERTION OF TISSUE EXPANDER INTO BILATERAL BREAST, OPEN APPROACH: ICD-10-PCS | Performed by: SURGERY

## 2021-10-28 PROCEDURE — 07B60ZX EXCISION OF LEFT AXILLARY LYMPHATIC, OPEN APPROACH, DIAGNOSTIC: ICD-10-PCS | Performed by: SURGERY

## 2021-10-28 PROCEDURE — 78195 LYMPH SYSTEM IMAGING: CPT | Performed by: SURGERY

## 2021-10-28 PROCEDURE — 99203 OFFICE O/P NEW LOW 30 MIN: CPT | Performed by: HOSPITALIST

## 2021-10-28 DEVICE — MATRIX ALLODERM SELECT MEDIUM: Type: IMPLANTABLE DEVICE | Site: BREAST | Status: FUNCTIONAL

## 2021-10-28 RX ORDER — ONDANSETRON 2 MG/ML
4 INJECTION INTRAMUSCULAR; INTRAVENOUS EVERY 6 HOURS PRN
Status: DISCONTINUED | OUTPATIENT
Start: 2021-10-28 | End: 2021-10-29

## 2021-10-28 RX ORDER — MORPHINE SULFATE 15 MG/1
15 TABLET ORAL EVERY 4 HOURS PRN
Status: DISCONTINUED | OUTPATIENT
Start: 2021-10-28 | End: 2021-10-29

## 2021-10-28 RX ORDER — HYDROCODONE BITARTRATE AND ACETAMINOPHEN 5; 325 MG/1; MG/1
1-2 TABLET ORAL EVERY 4 HOURS PRN
Qty: 40 TABLET | Refills: 0 | Status: SHIPPED | OUTPATIENT
Start: 2021-10-28 | End: 2021-11-03

## 2021-10-28 RX ORDER — MORPHINE SULFATE 2 MG/ML
0.5 INJECTION, SOLUTION INTRAMUSCULAR; INTRAVENOUS EVERY 2 HOUR PRN
Status: DISCONTINUED | OUTPATIENT
Start: 2021-10-28 | End: 2021-10-29

## 2021-10-28 RX ORDER — MORPHINE SULFATE 2 MG/ML
2 INJECTION, SOLUTION INTRAMUSCULAR; INTRAVENOUS EVERY 2 HOUR PRN
Status: DISCONTINUED | OUTPATIENT
Start: 2021-10-28 | End: 2021-10-29

## 2021-10-28 RX ORDER — MORPHINE SULFATE 2 MG/ML
1 INJECTION, SOLUTION INTRAMUSCULAR; INTRAVENOUS EVERY 2 HOUR PRN
Status: DISCONTINUED | OUTPATIENT
Start: 2021-10-28 | End: 2021-10-29

## 2021-10-28 RX ORDER — NEOSTIGMINE METHYLSULFATE 1 MG/ML
INJECTION INTRAVENOUS AS NEEDED
Status: DISCONTINUED | OUTPATIENT
Start: 2021-10-28 | End: 2021-10-28 | Stop reason: SURG

## 2021-10-28 RX ORDER — PANTOPRAZOLE SODIUM 40 MG/1
40 TABLET, DELAYED RELEASE ORAL DAILY
Status: DISCONTINUED | OUTPATIENT
Start: 2021-10-29 | End: 2021-10-29

## 2021-10-28 RX ORDER — DEXAMETHASONE SODIUM PHOSPHATE 4 MG/ML
VIAL (ML) INJECTION AS NEEDED
Status: DISCONTINUED | OUTPATIENT
Start: 2021-10-28 | End: 2021-10-28 | Stop reason: SURG

## 2021-10-28 RX ORDER — HYDROCODONE BITARTRATE AND ACETAMINOPHEN 5; 325 MG/1; MG/1
2 TABLET ORAL AS NEEDED
Status: DISCONTINUED | OUTPATIENT
Start: 2021-10-28 | End: 2021-10-28 | Stop reason: HOSPADM

## 2021-10-28 RX ORDER — HALOPERIDOL 5 MG/ML
0.25 INJECTION INTRAMUSCULAR ONCE AS NEEDED
Status: DISCONTINUED | OUTPATIENT
Start: 2021-10-28 | End: 2021-10-28 | Stop reason: HOSPADM

## 2021-10-28 RX ORDER — ONDANSETRON 2 MG/ML
INJECTION INTRAMUSCULAR; INTRAVENOUS AS NEEDED
Status: DISCONTINUED | OUTPATIENT
Start: 2021-10-28 | End: 2021-10-28 | Stop reason: SURG

## 2021-10-28 RX ORDER — HYDROMORPHONE HYDROCHLORIDE 1 MG/ML
0.6 INJECTION, SOLUTION INTRAMUSCULAR; INTRAVENOUS; SUBCUTANEOUS EVERY 5 MIN PRN
Status: DISCONTINUED | OUTPATIENT
Start: 2021-10-28 | End: 2021-10-28 | Stop reason: HOSPADM

## 2021-10-28 RX ORDER — LIDOCAINE HYDROCHLORIDE 40 MG/ML
SOLUTION TOPICAL AS NEEDED
Status: DISCONTINUED | OUTPATIENT
Start: 2021-10-28 | End: 2021-10-28 | Stop reason: SURG

## 2021-10-28 RX ORDER — HYDROCODONE BITARTRATE AND ACETAMINOPHEN 5; 325 MG/1; MG/1
1 TABLET ORAL AS NEEDED
Status: DISCONTINUED | OUTPATIENT
Start: 2021-10-28 | End: 2021-10-28 | Stop reason: HOSPADM

## 2021-10-28 RX ORDER — INDOCYANINE GREEN AND WATER 25 MG
KIT INJECTION AS NEEDED
Status: DISCONTINUED | OUTPATIENT
Start: 2021-10-28 | End: 2021-10-28 | Stop reason: SURG

## 2021-10-28 RX ORDER — ACETAMINOPHEN 650 MG
TABLET, EXTENDED RELEASE ORAL AS NEEDED
Status: DISCONTINUED | OUTPATIENT
Start: 2021-10-28 | End: 2021-10-28 | Stop reason: HOSPADM

## 2021-10-28 RX ORDER — ENOXAPARIN SODIUM 100 MG/ML
40 INJECTION SUBCUTANEOUS DAILY
Status: DISCONTINUED | OUTPATIENT
Start: 2021-10-29 | End: 2021-10-29

## 2021-10-28 RX ORDER — OXYCODONE HYDROCHLORIDE 5 MG/1
5 TABLET ORAL EVERY 4 HOURS PRN
Status: DISCONTINUED | OUTPATIENT
Start: 2021-10-28 | End: 2021-10-29

## 2021-10-28 RX ORDER — MIDAZOLAM HYDROCHLORIDE 1 MG/ML
INJECTION INTRAMUSCULAR; INTRAVENOUS AS NEEDED
Status: DISCONTINUED | OUTPATIENT
Start: 2021-10-28 | End: 2021-10-28 | Stop reason: SURG

## 2021-10-28 RX ORDER — MORPHINE SULFATE 4 MG/ML
4 INJECTION, SOLUTION INTRAMUSCULAR; INTRAVENOUS EVERY 10 MIN PRN
Status: DISCONTINUED | OUTPATIENT
Start: 2021-10-28 | End: 2021-10-28 | Stop reason: HOSPADM

## 2021-10-28 RX ORDER — OXYCODONE HYDROCHLORIDE 5 MG/1
10 TABLET ORAL EVERY 4 HOURS PRN
Status: DISCONTINUED | OUTPATIENT
Start: 2021-10-28 | End: 2021-10-29

## 2021-10-28 RX ORDER — NALOXONE HYDROCHLORIDE 0.4 MG/ML
80 INJECTION, SOLUTION INTRAMUSCULAR; INTRAVENOUS; SUBCUTANEOUS AS NEEDED
Status: DISCONTINUED | OUTPATIENT
Start: 2021-10-28 | End: 2021-10-28 | Stop reason: HOSPADM

## 2021-10-28 RX ORDER — SODIUM CHLORIDE, SODIUM LACTATE, POTASSIUM CHLORIDE, CALCIUM CHLORIDE 600; 310; 30; 20 MG/100ML; MG/100ML; MG/100ML; MG/100ML
INJECTION, SOLUTION INTRAVENOUS CONTINUOUS
Status: DISCONTINUED | OUTPATIENT
Start: 2021-10-28 | End: 2021-10-28 | Stop reason: HOSPADM

## 2021-10-28 RX ORDER — METOCLOPRAMIDE HYDROCHLORIDE 5 MG/ML
10 INJECTION INTRAMUSCULAR; INTRAVENOUS EVERY 6 HOURS PRN
Status: DISCONTINUED | OUTPATIENT
Start: 2021-10-28 | End: 2021-10-29

## 2021-10-28 RX ORDER — ACETAMINOPHEN 500 MG
1000 TABLET ORAL EVERY 8 HOURS
Status: COMPLETED | OUTPATIENT
Start: 2021-10-28 | End: 2021-10-29

## 2021-10-28 RX ORDER — ACETAMINOPHEN 500 MG
1000 TABLET ORAL ONCE
Status: COMPLETED | OUTPATIENT
Start: 2021-10-28 | End: 2021-10-28

## 2021-10-28 RX ORDER — DEXAMETHASONE SODIUM PHOSPHATE 10 MG/ML
INJECTION, SOLUTION INTRAMUSCULAR; INTRAVENOUS AS NEEDED
Status: DISCONTINUED | OUTPATIENT
Start: 2021-10-28 | End: 2021-10-28 | Stop reason: SURG

## 2021-10-28 RX ORDER — HYDROMORPHONE HYDROCHLORIDE 1 MG/ML
0.4 INJECTION, SOLUTION INTRAMUSCULAR; INTRAVENOUS; SUBCUTANEOUS EVERY 5 MIN PRN
Status: DISCONTINUED | OUTPATIENT
Start: 2021-10-28 | End: 2021-10-28 | Stop reason: HOSPADM

## 2021-10-28 RX ORDER — HYDROMORPHONE HYDROCHLORIDE 1 MG/ML
0.2 INJECTION, SOLUTION INTRAMUSCULAR; INTRAVENOUS; SUBCUTANEOUS EVERY 5 MIN PRN
Status: DISCONTINUED | OUTPATIENT
Start: 2021-10-28 | End: 2021-10-28 | Stop reason: HOSPADM

## 2021-10-28 RX ORDER — LIDOCAINE HYDROCHLORIDE 10 MG/ML
INJECTION, SOLUTION EPIDURAL; INFILTRATION; INTRACAUDAL; PERINEURAL AS NEEDED
Status: DISCONTINUED | OUTPATIENT
Start: 2021-10-28 | End: 2021-10-28 | Stop reason: SURG

## 2021-10-28 RX ORDER — SODIUM CHLORIDE, SODIUM LACTATE, POTASSIUM CHLORIDE, CALCIUM CHLORIDE 600; 310; 30; 20 MG/100ML; MG/100ML; MG/100ML; MG/100ML
INJECTION, SOLUTION INTRAVENOUS CONTINUOUS
Status: DISCONTINUED | OUTPATIENT
Start: 2021-10-28 | End: 2021-10-29

## 2021-10-28 RX ORDER — HYDROCODONE BITARTRATE AND ACETAMINOPHEN 5; 325 MG/1; MG/1
2 TABLET ORAL EVERY 4 HOURS PRN
Status: DISCONTINUED | OUTPATIENT
Start: 2021-10-28 | End: 2021-10-29

## 2021-10-28 RX ORDER — MORPHINE SULFATE 10 MG/ML
6 INJECTION, SOLUTION INTRAMUSCULAR; INTRAVENOUS EVERY 10 MIN PRN
Status: DISCONTINUED | OUTPATIENT
Start: 2021-10-28 | End: 2021-10-28 | Stop reason: HOSPADM

## 2021-10-28 RX ORDER — METOCLOPRAMIDE 10 MG/1
10 TABLET ORAL EVERY 6 HOURS PRN
Status: DISCONTINUED | OUTPATIENT
Start: 2021-10-28 | End: 2021-10-29

## 2021-10-28 RX ORDER — CEFAZOLIN SODIUM/WATER 2 G/20 ML
SYRINGE (ML) INTRAVENOUS AS NEEDED
Status: DISCONTINUED | OUTPATIENT
Start: 2021-10-28 | End: 2021-10-28 | Stop reason: SURG

## 2021-10-28 RX ORDER — CEPHALEXIN 500 MG/1
500 CAPSULE ORAL 4 TIMES DAILY
Qty: 40 CAPSULE | Refills: 2 | Status: SHIPPED | OUTPATIENT
Start: 2021-10-28 | End: 2021-11-07

## 2021-10-28 RX ORDER — EPHEDRINE SULFATE 50 MG/ML
INJECTION, SOLUTION INTRAVENOUS AS NEEDED
Status: DISCONTINUED | OUTPATIENT
Start: 2021-10-28 | End: 2021-10-28 | Stop reason: SURG

## 2021-10-28 RX ORDER — METHYLENE BLUE 10 MG/ML
INJECTION INTRAVENOUS AS NEEDED
Status: DISCONTINUED | OUTPATIENT
Start: 2021-10-28 | End: 2021-10-28 | Stop reason: HOSPADM

## 2021-10-28 RX ORDER — MORPHINE SULFATE 4 MG/ML
4 INJECTION, SOLUTION INTRAMUSCULAR; INTRAVENOUS EVERY 2 HOUR PRN
Status: DISCONTINUED | OUTPATIENT
Start: 2021-10-28 | End: 2021-10-29

## 2021-10-28 RX ORDER — HYDROCODONE BITARTRATE AND ACETAMINOPHEN 5; 325 MG/1; MG/1
1 TABLET ORAL EVERY 4 HOURS PRN
Status: DISCONTINUED | OUTPATIENT
Start: 2021-10-28 | End: 2021-10-29

## 2021-10-28 RX ORDER — CEFAZOLIN SODIUM/WATER 2 G/20 ML
2 SYRINGE (ML) INTRAVENOUS EVERY 8 HOURS
Status: DISCONTINUED | OUTPATIENT
Start: 2021-10-28 | End: 2021-10-29

## 2021-10-28 RX ORDER — CEFAZOLIN SODIUM/WATER 2 G/20 ML
2 SYRINGE (ML) INTRAVENOUS ONCE
Status: DISCONTINUED | OUTPATIENT
Start: 2021-10-28 | End: 2021-10-28 | Stop reason: HOSPADM

## 2021-10-28 RX ORDER — ONDANSETRON 4 MG/1
4 TABLET, FILM COATED ORAL EVERY 8 HOURS PRN
Qty: 30 TABLET | Refills: 0 | Status: SHIPPED | OUTPATIENT
Start: 2021-10-28 | End: 2021-11-12

## 2021-10-28 RX ORDER — MORPHINE SULFATE 4 MG/ML
2 INJECTION, SOLUTION INTRAMUSCULAR; INTRAVENOUS EVERY 10 MIN PRN
Status: DISCONTINUED | OUTPATIENT
Start: 2021-10-28 | End: 2021-10-28 | Stop reason: HOSPADM

## 2021-10-28 RX ORDER — ACETAMINOPHEN 500 MG
1000 TABLET ORAL EVERY 6 HOURS PRN
Status: DISCONTINUED | OUTPATIENT
Start: 2021-10-28 | End: 2021-10-29

## 2021-10-28 RX ORDER — DIPHENHYDRAMINE HYDROCHLORIDE 50 MG/ML
12.5 INJECTION INTRAMUSCULAR; INTRAVENOUS EVERY 4 HOURS PRN
Status: DISCONTINUED | OUTPATIENT
Start: 2021-10-28 | End: 2021-10-29

## 2021-10-28 RX ORDER — GLYCOPYRROLATE 0.2 MG/ML
INJECTION, SOLUTION INTRAMUSCULAR; INTRAVENOUS AS NEEDED
Status: DISCONTINUED | OUTPATIENT
Start: 2021-10-28 | End: 2021-10-28 | Stop reason: SURG

## 2021-10-28 RX ORDER — ROPIVACAINE HYDROCHLORIDE 5 MG/ML
INJECTION, SOLUTION EPIDURAL; INFILTRATION; PERINEURAL AS NEEDED
Status: DISCONTINUED | OUTPATIENT
Start: 2021-10-28 | End: 2021-10-28 | Stop reason: SURG

## 2021-10-28 RX ORDER — PROCHLORPERAZINE EDISYLATE 5 MG/ML
5 INJECTION INTRAMUSCULAR; INTRAVENOUS ONCE AS NEEDED
Status: DISCONTINUED | OUTPATIENT
Start: 2021-10-28 | End: 2021-10-28 | Stop reason: HOSPADM

## 2021-10-28 RX ORDER — MORPHINE SULFATE 4 MG/ML
6 INJECTION, SOLUTION INTRAMUSCULAR; INTRAVENOUS EVERY 2 HOUR PRN
Status: DISCONTINUED | OUTPATIENT
Start: 2021-10-28 | End: 2021-10-29

## 2021-10-28 RX ORDER — ONDANSETRON 2 MG/ML
4 INJECTION INTRAMUSCULAR; INTRAVENOUS ONCE AS NEEDED
Status: DISCONTINUED | OUTPATIENT
Start: 2021-10-28 | End: 2021-10-28 | Stop reason: HOSPADM

## 2021-10-28 RX ORDER — ACETAMINOPHEN 500 MG
500 TABLET ORAL EVERY 6 HOURS PRN
Status: DISCONTINUED | OUTPATIENT
Start: 2021-10-28 | End: 2021-10-29

## 2021-10-28 RX ORDER — ROCURONIUM BROMIDE 10 MG/ML
INJECTION, SOLUTION INTRAVENOUS AS NEEDED
Status: DISCONTINUED | OUTPATIENT
Start: 2021-10-28 | End: 2021-10-28 | Stop reason: SURG

## 2021-10-28 RX ORDER — DOCUSATE SODIUM 100 MG/1
100 CAPSULE, LIQUID FILLED ORAL 2 TIMES DAILY
Status: DISCONTINUED | OUTPATIENT
Start: 2021-10-29 | End: 2021-10-29

## 2021-10-28 RX ORDER — DOCUSATE SODIUM 100 MG/1
100 CAPSULE, LIQUID FILLED ORAL 2 TIMES DAILY
Qty: 40 CAPSULE | Refills: 0 | Status: SHIPPED | OUTPATIENT
Start: 2021-10-28 | End: 2021-12-10 | Stop reason: ALTCHOICE

## 2021-10-28 RX ORDER — ONDANSETRON 4 MG/1
4 TABLET, ORALLY DISINTEGRATING ORAL EVERY 6 HOURS PRN
Status: DISCONTINUED | OUTPATIENT
Start: 2021-10-28 | End: 2021-10-29

## 2021-10-28 RX ORDER — DIPHENHYDRAMINE HCL 25 MG
25 CAPSULE ORAL EVERY 4 HOURS PRN
Status: DISCONTINUED | OUTPATIENT
Start: 2021-10-28 | End: 2021-10-29

## 2021-10-28 RX ADMIN — INDOCYANINE GREEN AND WATER 25 MG: 25 MG KIT INJECTION at 16:50:00

## 2021-10-28 RX ADMIN — EPHEDRINE SULFATE 10 MG: 50 INJECTION, SOLUTION INTRAVENOUS at 13:40:00

## 2021-10-28 RX ADMIN — SODIUM CHLORIDE, SODIUM LACTATE, POTASSIUM CHLORIDE, CALCIUM CHLORIDE: 600; 310; 30; 20 INJECTION, SOLUTION INTRAVENOUS at 16:58:00

## 2021-10-28 RX ADMIN — ROPIVACAINE HYDROCHLORIDE 30 ML: 5 INJECTION, SOLUTION EPIDURAL; INFILTRATION; PERINEURAL at 13:11:00

## 2021-10-28 RX ADMIN — LIDOCAINE HYDROCHLORIDE 2 ML: 10 INJECTION, SOLUTION EPIDURAL; INFILTRATION; INTRACAUDAL; PERINEURAL at 12:55:00

## 2021-10-28 RX ADMIN — MIDAZOLAM HYDROCHLORIDE 2 MG: 1 INJECTION INTRAMUSCULAR; INTRAVENOUS at 12:55:00

## 2021-10-28 RX ADMIN — DEXAMETHASONE SODIUM PHOSPHATE 4 MG: 10 INJECTION, SOLUTION INTRAMUSCULAR; INTRAVENOUS at 13:11:00

## 2021-10-28 RX ADMIN — LIDOCAINE HYDROCHLORIDE 50 ML: 10 INJECTION, SOLUTION EPIDURAL; INFILTRATION; INTRACAUDAL; PERINEURAL at 13:23:00

## 2021-10-28 RX ADMIN — GLYCOPYRROLATE 0.8 MG: 0.2 INJECTION, SOLUTION INTRAMUSCULAR; INTRAVENOUS at 16:56:00

## 2021-10-28 RX ADMIN — DEXAMETHASONE SODIUM PHOSPHATE 4 MG: 10 INJECTION, SOLUTION INTRAMUSCULAR; INTRAVENOUS at 13:05:00

## 2021-10-28 RX ADMIN — SODIUM CHLORIDE, SODIUM LACTATE, POTASSIUM CHLORIDE, CALCIUM CHLORIDE: 600; 310; 30; 20 INJECTION, SOLUTION INTRAVENOUS at 16:19:00

## 2021-10-28 RX ADMIN — LIDOCAINE HYDROCHLORIDE 2 ML: 40 SOLUTION TOPICAL at 13:23:00

## 2021-10-28 RX ADMIN — ROPIVACAINE HYDROCHLORIDE 30 ML: 5 INJECTION, SOLUTION EPIDURAL; INFILTRATION; PERINEURAL at 13:05:00

## 2021-10-28 RX ADMIN — ROCURONIUM BROMIDE 10 MG: 10 INJECTION, SOLUTION INTRAVENOUS at 13:23:00

## 2021-10-28 RX ADMIN — ONDANSETRON 4 MG: 2 INJECTION INTRAMUSCULAR; INTRAVENOUS at 15:15:00

## 2021-10-28 RX ADMIN — ROCURONIUM BROMIDE 30 MG: 10 INJECTION, SOLUTION INTRAVENOUS at 14:52:00

## 2021-10-28 RX ADMIN — NEOSTIGMINE METHYLSULFATE 5 MG: 1 INJECTION INTRAVENOUS at 16:56:00

## 2021-10-28 RX ADMIN — CEFAZOLIN SODIUM/WATER 2 G: 2 G/20 ML SYRINGE (ML) INTRAVENOUS at 13:29:00

## 2021-10-28 RX ADMIN — EPHEDRINE SULFATE 10 MG: 50 INJECTION, SOLUTION INTRAVENOUS at 15:44:00

## 2021-10-28 RX ADMIN — DEXAMETHASONE SODIUM PHOSPHATE 4 MG: 4 MG/ML VIAL (ML) INJECTION at 13:29:00

## 2021-10-28 RX ADMIN — EPHEDRINE SULFATE 5 MG: 50 INJECTION, SOLUTION INTRAVENOUS at 15:14:00

## 2021-10-28 RX ADMIN — LIDOCAINE HYDROCHLORIDE 2 ML: 10 INJECTION, SOLUTION EPIDURAL; INFILTRATION; INTRACAUDAL; PERINEURAL at 13:09:00

## 2021-10-28 NOTE — ANESTHESIA POSTPROCEDURE EVALUATION
Patient: Dakota Neumann    Procedure Summary     Date: 10/28/21 Room / Location: [address] [place] [address] MAIN OR 08 / [address] [place] [address] MAIN OR    Anesthesia Start: 8987 Anesthesia Stop: 2689    Procedures:       Bilateral nipple  sparing mastectomy, left lymphoscintigraphy, left sentine

## 2021-10-28 NOTE — ANESTHESIA PREPROCEDURE EVALUATION
Anesthesia PreOp Note    HPI:     Carolyne Rojo is a 72year old female who presents for preoperative consultation requested by: Ramakrishna Anderson MD    Date of Surgery: 10/28/2021    Procedure(s):  Bilateral nipple verses skin sparing mastectomy, left Take 1 tablet (2 mg total) by mouth nightly., Disp: 30 tablet, Rfl: 1, 10/14/2021  ergocalciferol 1.25 MG (47846 UT) Oral Cap, Take 1 capsule (50,000 Units total) by mouth once a week., Disp: 4 capsule, Rfl: 2, 10/23/2021      lactated ringers infusion, , Not Asked        Special Diet: Not Asked        Stress Concern: Not Asked        Weight Concern: Not Asked    Social History Narrative      Not on file    Social Determinants of Health  Financial Resource Strain:       Difficulty of Paying Living Expenses: Results   Component Value Date    INR 1.01 10/18/2021       Vital Signs: Body mass index is 25.96 kg/m². height is 1.651 m (5' 5\") and weight is 70.8 kg (156 lb). Her oral temperature is 97.9 °F (36.6 °C).  Her blood pressure is 127/86 and her pulse is

## 2021-10-28 NOTE — ANESTHESIA PROCEDURE NOTES
Peripheral Block  Performed by: Merlin Knott MD  Authorized by: Merlin Knott MD       General Information and Staff    Start Time:  10/28/2021 12:55 PM  End Time:  10/28/2021 1:12 PM  Anesthesiologist:  Merlin Knott MD  Performed by

## 2021-10-28 NOTE — PROGRESS NOTES
[facility]    Progress Note    Saima Reddy Patient Status:  Outpatient in a Bed    1950 MRN [id number]   Location [address] [facility] Attending Jerrod Khoury MD   Hosp Day # 0 PCP Verner Iles 10/18/2021    CREATSERUM 0.90 10/18/2021    BUN 11 10/18/2021     10/18/2021    K 4.3 10/18/2021     10/18/2021    CO2 28.0 10/18/2021     (H) 10/18/2021    CA 9.9 10/18/2021    ALB 3.7 10/18/2021    ALKPHO 69 10/18/2021    BILT 0.7 10/1

## 2021-10-28 NOTE — H&P
History of Present Illness:   Ms. Shannan Beckett is a 72year old woman who presents with imaging detected left breast cancer. The patient denies any palpable masses, nipple discharge, skin changes or axillary symptoms.   She does have a family history of br pantoprazole 40 MG Oral Tab EC, Take 40 mg by mouth daily. , Disp: , Rfl:            Allergies:    No Known Allergies     Family History:   Family History   Problem Relation Age of Onset   • Breast Cancer Sister 47         metastatic at time of diagnosis difficulty breathing when lying flat, SOB/Coughing at night, swelling of the legs or chest pain while walking.     Breasts:  See history of present illness     Gastrointestinal:     There is no history of difficulty or pain with swallowing, +reflux symptom alert, oriented, well-nourished and  well-developed woman who appears her stated age. Her speech patterns and movements are normal. Her affect is appropriate.     HEENT: The head is normocephalic. The neck is supple.  The thyroid is not enlarged and is with form: Breast, AJCC 8th Edition  - Clinical stage from 10/6/2021: Stage IA (cT1c, cN0, cM0, G1, ER+, PA+, HER2-) - Signed by Selvin Simon MD on 10/6/2021     Discussion and Plan:  I had a discussion with the Patient regarding her breast exam. On exam tod if genetic testing results are negative. I offered her the ability to discuss with plastic surgery upfront should she be interested in discussing possible radiation related changes to the implants.  She was given ample opportunity for questions and those q

## 2021-10-28 NOTE — ANESTHESIA PROCEDURE NOTES
Airway  Date/Time: 10/28/2021 1:26 PM  Urgency: Elective    Airway not difficult    General Information and Staff    Patient location during procedure: OR  Resident/CRNA: Kin Rolon CRNA  Performed: CRNA     Indications and Patient Condition  Indicati

## 2021-10-29 ENCOUNTER — NURSE NAVIGATOR ENCOUNTER (OUTPATIENT)
Dept: HEMATOLOGY/ONCOLOGY | Facility: HOSPITAL | Age: 71
End: 2021-10-29

## 2021-10-29 VITALS
DIASTOLIC BLOOD PRESSURE: 69 MMHG | BODY MASS INDEX: 25.99 KG/M2 | RESPIRATION RATE: 20 BRPM | HEART RATE: 64 BPM | HEIGHT: 65 IN | TEMPERATURE: 98 F | SYSTOLIC BLOOD PRESSURE: 100 MMHG | WEIGHT: 156 LBS | OXYGEN SATURATION: 98 %

## 2021-10-29 PROCEDURE — 99214 OFFICE O/P EST MOD 30 MIN: CPT | Performed by: HOSPITALIST

## 2021-10-29 NOTE — DISCHARGE SUMMARY
[facility]    Discharge Summary    Tricia Bhakta Patient Status:  Outpatient in a Bed    1950 MRN [id number]   Location [facility] Attending Elvira Flores MD   Hosp Day # 0 PCP Andre Zamora MD     Date of Admi Surgeon Location Status    [id number] 10/28/21 Bilateral nipple  sparing mastectomy, left lymphoscintigraphy, left sentinel lymph node biopsy,  Cheri Essex) Immediate bilateral breast reconstruction with bilateral tissue expanders and acellular dermal matrix (Pav 1-2 tablets by mouth every 4 (four) hours as needed for Pain. Quantity: 40 tablet  Refills: 0     ondansetron 4 mg tablet  Commonly known as: ZOFRAN      Take 1 tablet (4 mg total) by mouth every 8 (eight) hours as needed for Nausea.    Quantity: 30 table overall experience with your operation. These instructions will help to minimize pain, optimize healing, and improve the likelihood of a successful result. What To Expect  · There will be some spotting of the incision lines for the next 1-2 weeks.   · Y with additional waterproof dressings if needed. These will be given to you by the hospital. If some water gets underneath the dressing during the shower, do not worry, just replace with a new dry waterproof dressing.    · No baths, swimming, or hot tubs unt medication. Return to Work or [employer]  · Vignesh Mccain can return to work when you are not taking pain medication, if your work does not involve strenuous activity. · Contact the office, if you need a medical note.      Follow-up Appointment with Jairo Ireland

## 2021-10-29 NOTE — CONSULTS
Pod 1 left breast mastectomy with general anesthesia and pec block for post op pain relef  Pt had good post op pain relief doing well cpm

## 2021-10-29 NOTE — OPERATIVE REPORT
[facility]    PATIENT'S NAME: Feliciano [facility]   ATTENDING PHYSICIAN: Telma Love MD   OPERATING PHYSICIAN: Fito Mcdaniel MD   PATIENT ACCOUNT#:   [id number]    LOCATION:   ROOM 3 A [facility]  MEDICAL RECORD #:   [id number]       DATE OF BI inframammary fold scars were marked as access for the mastectomy. The patient was then taken to the operating room by Dr. Sushma Altamirano team where she underwent bilateral nipple-sparing mastectomy.   Once Dr. Sushma Altamirano portion of the procedure was completed, I e dermal suture and running 4-0 Monocryl subcuticular suture. Given the patient's previous Wise pattern mastopexy, an intraoperative assessment of mastectomy skin flap perfusion was performed to determine if intraoperative expansion was feasible.   This show

## 2021-10-29 NOTE — PROGRESS NOTES
Patient is POD#1 s/p bilateral nipple sparing mastectomies with left sentinel lymph node biopsy, and immediate breast reconstruction with tissue expander placement. Patient is supported by her spouse Deborrnuno Yost. Presented for post op teaching.   Dr. Layton Herrera

## 2021-10-29 NOTE — PROGRESS NOTES
PRS    POD #1 S/p BL NSM and TE +ADM  placement  and spy. Pt doing well AVSS  JPs serosanguinous  PE: NAC viable, mastectomy flaps viable with minimal lower pole hyperemia BL more on the L.  Dressing CDI  A/P: DC home today   Meds as ordered , BOOM care, act

## 2021-11-01 NOTE — OPERATIVE REPORT
[facility]    PATIENT'S NAME: Enma Stallworthhumera [facility]   ATTENDING PHYSICIAN: Nathan Palmer MD   OPERATING PHYSICIAN: Julia Darden.  Jazlyn Bardales MD   PATIENT ACCOUNT#:   [id number]    LOCATION:   ROOM 3 A [facility]  MEDICAL RECORD #:   [id number]       DATE OF JEREMY complexity in order to successfully accommodate the immediate reconstruction. Risks and possible complications were discussed with the patient as above.     OPERATIVE TECHNIQUE:  The patient had undergone injection of radioisotope as well as lymphoscintigr clavicle, sternum, inframammary fold, and latissimus tendon laterally. Her left breast was then dissected off the underlying muscle with electrocautery including en bloc excision of the pectoralis fascia.   It was carefully oriented with short at the base

## 2021-11-02 ENCOUNTER — NURSE NAVIGATOR ENCOUNTER (OUTPATIENT)
Dept: HEMATOLOGY/ONCOLOGY | Facility: HOSPITAL | Age: 71
End: 2021-11-02

## 2021-11-03 ENCOUNTER — OFFICE VISIT (OUTPATIENT)
Dept: SURGERY | Facility: CLINIC | Age: 71
End: 2021-11-03
Payer: COMMERCIAL

## 2021-11-03 VITALS
DIASTOLIC BLOOD PRESSURE: 66 MMHG | OXYGEN SATURATION: 98 % | RESPIRATION RATE: 18 BRPM | SYSTOLIC BLOOD PRESSURE: 125 MMHG | HEIGHT: 65 IN | WEIGHT: 156 LBS | BODY MASS INDEX: 25.99 KG/M2 | HEART RATE: 76 BPM | TEMPERATURE: 98 F

## 2021-11-03 DIAGNOSIS — R52 PAIN: ICD-10-CM

## 2021-11-03 DIAGNOSIS — C50.112 MALIGNANT NEOPLASM OF CENTRAL PORTION OF LEFT BREAST IN FEMALE, ESTROGEN RECEPTOR POSITIVE (HCC): Primary | ICD-10-CM

## 2021-11-03 DIAGNOSIS — Z17.0 MALIGNANT NEOPLASM OF CENTRAL PORTION OF LEFT BREAST IN FEMALE, ESTROGEN RECEPTOR POSITIVE (HCC): Primary | ICD-10-CM

## 2021-11-03 PROCEDURE — 99024 POSTOP FOLLOW-UP VISIT: CPT | Performed by: SURGERY

## 2021-11-03 PROCEDURE — 3074F SYST BP LT 130 MM HG: CPT | Performed by: SURGERY

## 2021-11-03 PROCEDURE — 3008F BODY MASS INDEX DOCD: CPT | Performed by: SURGERY

## 2021-11-03 PROCEDURE — 3078F DIAST BP <80 MM HG: CPT | Performed by: SURGERY

## 2021-11-03 RX ORDER — HYDROCODONE BITARTRATE AND ACETAMINOPHEN 5; 325 MG/1; MG/1
1-2 TABLET ORAL EVERY 6 HOURS PRN
Qty: 40 TABLET | Refills: 0 | Status: SHIPPED | OUTPATIENT
Start: 2021-11-03

## 2021-11-05 ENCOUNTER — OFFICE VISIT (OUTPATIENT)
Dept: SURGERY | Facility: CLINIC | Age: 71
End: 2021-11-05
Payer: COMMERCIAL

## 2021-11-05 VITALS
RESPIRATION RATE: 18 BRPM | SYSTOLIC BLOOD PRESSURE: 114 MMHG | HEART RATE: 90 BPM | TEMPERATURE: 99 F | DIASTOLIC BLOOD PRESSURE: 72 MMHG | OXYGEN SATURATION: 100 %

## 2021-11-05 DIAGNOSIS — Z90.13 ABSENCE OF BOTH BREASTS: Primary | ICD-10-CM

## 2021-11-05 PROCEDURE — 1111F DSCHRG MED/CURRENT MED MERGE: CPT | Performed by: PHYSICIAN ASSISTANT

## 2021-11-05 PROCEDURE — 99024 POSTOP FOLLOW-UP VISIT: CPT | Performed by: PHYSICIAN ASSISTANT

## 2021-11-05 PROCEDURE — 3074F SYST BP LT 130 MM HG: CPT | Performed by: PHYSICIAN ASSISTANT

## 2021-11-05 PROCEDURE — 3078F DIAST BP <80 MM HG: CPT | Performed by: PHYSICIAN ASSISTANT

## 2021-11-05 NOTE — PROGRESS NOTES
Jill Youngblood is a 72year old female who presents today for a follow-up after bilateral nipple sparing mastectomy with left breast injection of blue dye for sentinel lymph node identification and left sentinel lymph node biopsy (Dr. Eliana Dc) and intraop drain was removed today due to low output. Neosporin, gauze, Tegaderm was placed. She tolerated this well. She can discontinue her oral antibiotic at this time. I recommend she continued with activity restrictions and compression.  We reviewed reasons to

## 2021-11-10 ENCOUNTER — OFFICE VISIT (OUTPATIENT)
Dept: HEMATOLOGY/ONCOLOGY | Facility: HOSPITAL | Age: 71
End: 2021-11-10
Payer: COMMERCIAL

## 2021-11-10 ENCOUNTER — TELEPHONE (OUTPATIENT)
Dept: HEMATOLOGY/ONCOLOGY | Facility: HOSPITAL | Age: 71
End: 2021-11-10

## 2021-11-10 VITALS
RESPIRATION RATE: 16 BRPM | TEMPERATURE: 98 F | HEIGHT: 65 IN | OXYGEN SATURATION: 100 % | BODY MASS INDEX: 25.33 KG/M2 | DIASTOLIC BLOOD PRESSURE: 72 MMHG | HEART RATE: 74 BPM | SYSTOLIC BLOOD PRESSURE: 127 MMHG | WEIGHT: 152 LBS

## 2021-11-10 DIAGNOSIS — Z17.0 MALIGNANT NEOPLASM OF CENTRAL PORTION OF LEFT BREAST IN FEMALE, ESTROGEN RECEPTOR POSITIVE (HCC): ICD-10-CM

## 2021-11-10 DIAGNOSIS — C50.112 MALIGNANT NEOPLASM OF CENTRAL PORTION OF LEFT BREAST IN FEMALE, ESTROGEN RECEPTOR POSITIVE (HCC): ICD-10-CM

## 2021-11-10 PROCEDURE — 99214 OFFICE O/P EST MOD 30 MIN: CPT | Performed by: INTERNAL MEDICINE

## 2021-11-10 RX ORDER — LETROZOLE 2.5 MG/1
2.5 TABLET, FILM COATED ORAL DAILY
Qty: 30 TABLET | Refills: 6 | Status: SHIPPED | OUTPATIENT
Start: 2021-11-10 | End: 2021-11-12

## 2021-11-10 NOTE — PATIENT INSTRUCTIONS
Letrozole Oral Tablet 2.5 mg  Uses  This medicine is used for the following purposes:  · infertility  · cancer  Instructions  This medicine may be taken with or without food.   It is very important that you take the medicine at about the same time every d estrogen. Tell your doctor and pharmacist if you ever had an allergic reaction to a medicine. Symptoms of an allergic reaction can include trouble breathing, skin rash, itching, swelling, or severe dizziness.   Some patients taking this medicine have exper baby.  Ask your pharmacist if this medicine can interact with any of your other medicines. Be sure to tell them about all the medicines you take. Please tell all your doctors and dentists that you are on this medicine before they provide care.   Do not sta take your medicine, but it does not tell you all there is to know about it. Your doctor or pharmacist may give you other documents about your medicine. Please talk to them if you have any questions. Always follow their advice.  There is a more complete descri

## 2021-11-10 NOTE — PROGRESS NOTES
SERGIO Millerkeisha Pimentel is a 72year old female here for follow up of Malignant neoplasm of central portion of left breast in female, estrogen receptor positive (hcc)    Patient status post B nipple sparing mastectomies and sentinel lymph node biopsy of t Used    Vaping Use      Vaping Use: Never used    Alcohol use:  Yes      Alcohol/week: 1.0 standard drink      Types: 1 Glasses of wine per week    Drug use: Never      Family History   Problem Relation Age of Onset   • Breast Cancer Sister 47        metast Will proceed with adjuvant hormonal therapy alone. D/w the patient that she will certainly benefit form adjuvant hormonal therapy with an AI for 5 yrs.  Briefly discussed with the patient the potential side effects of adjuvant hormonal therapy with an AI SPARING MASTECTOMY (SHORT STITCH=BASE OF                    NIPPLE, LONG STITCH= AXILLARY TAIL)                                                                 C) - Breast, left, 3.  LEFT BREAST IMPLANT #GROSSONLY are transcribed in the staging summary. Repeat HER-2/jono testing by immunohistochemistry is reported below. C. Left breast implant; removal:  · Breast implant, gross examination only     D.  Right breast; right breast nipple sparing mastectomy:  · [facility] Procedure: Total mastectomy      Specimen Laterality:    Left     TUMOR      Tumor Site:    Clock position      :    12 o'clock    Histologic Type:     Invasive carcinoma of no special type (ductal)    Glandular (Acinar) / Tubular Differentiation:    Sco :    42 mm     LYMPH NODES    Regional Lymph Nodes:    Uninvolved by tumor cells      Total Number of Lymph Nodes Examined:    1      Number of Chandler Nodes Examined:    1     PATHOLOGIC STAGE CLASSIFICATION (pTNM, AJCC 8th Edition)         Primary Tumor

## 2021-11-10 NOTE — TELEPHONE ENCOUNTER
Message left for patient to please return call to Breast RN Navigator to discuss follow up appointment with Dr. Nate Edge.

## 2021-11-11 PROBLEM — Z79.890 HORMONE REPLACEMENT THERAPY (HRT): Status: RESOLVED | Noted: 2021-09-01 | Resolved: 2021-11-11

## 2021-11-14 NOTE — PROGRESS NOTES
HPI:   Patient presents with: Follow - Up: B mastectomy  Breast Cancer  Anxiety  Insomnia      Blanquita Okeefe is a 72year old female who presents for follow up:    Situational anxiety and insomnia  Mood and insomnia are getting better.   She complains Smokeless tobacco: Never Used    Vaping Use      Vaping Use: Never used    Alcohol use:  Yes      Alcohol/week: 1.0 standard drink      Types: 1 Glasses of wine per week    Drug use: Never        REVIEW OF SYSTEMS:   Pertinent positives and negatives noted who is here for situational anxiety and insomnia. denies depression and SHIP     Mood and sleep getting better.   She is doing reasonably well on the current treatment regimen-lunesta prn  Would like continue the present medication(s):   Has no significant (microderm etc).     Time spent: 40 minutes, obtaining history, evaluating patient, discussing differential diagnosis, lifestyle recommendations, treatment options, risks and benefits of my recommendations, counseling patient, discussing prognosis/expectati

## 2021-11-19 ENCOUNTER — OFFICE VISIT (OUTPATIENT)
Dept: SURGERY | Facility: CLINIC | Age: 71
End: 2021-11-19
Payer: COMMERCIAL

## 2021-11-19 DIAGNOSIS — Z90.13 ABSENCE OF BOTH BREASTS: Primary | ICD-10-CM

## 2021-11-19 PROCEDURE — 99024 POSTOP FOLLOW-UP VISIT: CPT | Performed by: SURGERY

## 2021-11-19 NOTE — PROGRESS NOTES
Inocencia Banegas is a 72year old female who presents today for a follow-up. She denies fever and chills. She denies nausea, vomiting, diarrhea or constipation. Physical Examination:  Breasts: Bilateral breast incisions are clean dry and intact.   Bonnie Diego

## 2021-11-24 ENCOUNTER — APPOINTMENT (OUTPATIENT)
Dept: PHYSICAL THERAPY | Facility: HOSPITAL | Age: 71
End: 2021-11-24
Attending: SURGERY
Payer: COMMERCIAL

## 2021-11-28 ENCOUNTER — LAB ENCOUNTER (OUTPATIENT)
Dept: LAB | Facility: REFERENCE LAB | Age: 71
End: 2021-11-28
Attending: FAMILY MEDICINE
Payer: COMMERCIAL

## 2021-11-28 DIAGNOSIS — D72.818 OTHER DECREASED WHITE BLOOD CELL (WBC) COUNT: ICD-10-CM

## 2021-11-28 PROCEDURE — 85025 COMPLETE CBC W/AUTO DIFF WBC: CPT

## 2021-11-28 PROCEDURE — 36415 COLL VENOUS BLD VENIPUNCTURE: CPT

## 2021-11-29 NOTE — PROGRESS NOTES
Braulio Starr,    Attached are the results of your recently performed labs/tests: All results are essentially within NORMAL limits.       Gerald Garcia MD  11/28/2021    (Report(s) are attached, future lab/test orders or any referrals are

## 2021-11-30 ENCOUNTER — OFFICE VISIT (OUTPATIENT)
Dept: PHYSICAL THERAPY | Facility: HOSPITAL | Age: 71
End: 2021-11-30
Attending: SURGERY
Payer: COMMERCIAL

## 2021-11-30 NOTE — PROGRESS NOTES
BREAST CANCER SURGICAL SCREENINGS  [facility]      PATIENT SUMMARY:     Involved Side:  LEFT                                                   Dominant Hand:     RIGHT                             Occupation:      Retired abd     abd      ext     ext     ext     Functional IR     Functional IR     Functional IR                      Shoulder strength  Right Left  Shoulder strength  Right Left  Shoulder strength  Right Left    flex 5 5   flex     flex      abd 5 5   abd MEASUREMENT I 30.9 32.8    TOTAL VOLUME  [phone]   % DIFFERENCE 0.2347 -[phone]

## 2021-12-03 ENCOUNTER — OFFICE VISIT (OUTPATIENT)
Dept: SURGERY | Facility: CLINIC | Age: 71
End: 2021-12-03
Payer: COMMERCIAL

## 2021-12-03 ENCOUNTER — TELEPHONE (OUTPATIENT)
Dept: FAMILY MEDICINE CLINIC | Facility: CLINIC | Age: 71
End: 2021-12-03

## 2021-12-03 ENCOUNTER — OFFICE VISIT (OUTPATIENT)
Dept: FAMILY MEDICINE CLINIC | Facility: CLINIC | Age: 71
End: 2021-12-03
Payer: COMMERCIAL

## 2021-12-03 ENCOUNTER — OFFICE VISIT (OUTPATIENT)
Dept: FAMILY MEDICINE CLINIC | Facility: CLINIC | Age: 71
End: 2021-12-03

## 2021-12-03 DIAGNOSIS — K64.9 HEMORRHOIDS, UNSPECIFIED HEMORRHOID TYPE: Primary | ICD-10-CM

## 2021-12-03 DIAGNOSIS — L56.8: ICD-10-CM

## 2021-12-03 DIAGNOSIS — R21 RASH AND NONSPECIFIC SKIN ERUPTION: ICD-10-CM

## 2021-12-03 DIAGNOSIS — Z90.13 ABSENCE OF BOTH BREASTS: Primary | ICD-10-CM

## 2021-12-03 DIAGNOSIS — R23.8 INTRINSIC AGING OF FACIAL SKIN: Primary | ICD-10-CM

## 2021-12-03 PROCEDURE — 99024 POSTOP FOLLOW-UP VISIT: CPT | Performed by: SURGERY

## 2021-12-03 PROCEDURE — 99213 OFFICE O/P EST LOW 20 MIN: CPT | Performed by: FAMILY MEDICINE

## 2021-12-03 RX ORDER — HYDROCORTISONE 25 MG/G
CREAM TOPICAL
Qty: 1 EACH | Refills: 1 | Status: SHIPPED | OUTPATIENT
Start: 2021-12-03

## 2021-12-03 RX ORDER — FLUOROURACIL 50 MG/G
1 CREAM TOPICAL 2 TIMES DAILY
Qty: 1 EACH | Refills: 1 | Status: SHIPPED | OUTPATIENT
Start: 2021-12-03

## 2021-12-03 NOTE — PROGRESS NOTES
Monet Juan is a 72year old female who presents today for a follow-up. She denies fever and chills. She denies nausea, vomiting, diarrhea or constipation. Physical Examination:  Breasts: Bilateral breast incisions are clean dry and intact.   The

## 2021-12-04 NOTE — PROCEDURES
HPI:   Patient presents with:  Derm Problem  Hemorrhoids       Kamran Edmond is a 72year old female who presents for Cosmetic Microdermabrasion (#1) for normal skin aging effects. See ROS below.     Tretinoin 0.1 % External Cream, 1 application to af Drug use: Never       REVIEW OF SYSTEMS:   Pertinent positives and negatives noted in the the HPI.  Specifically:  GEN:  No fever or fatigue  HEAD:  No headaches, no dizziness  EYES:  No vision change or complaints  EARS:  No hearing loss, no ear pain  MOUT Visit:  Requested Prescriptions     Signed Prescriptions Disp Refills   • fluorouracil 5 % External Cream 1 each 1     Sig: Apply 1 Application topically 2 (two) times daily.  As needed to AA (superior perioral area, above upper lip) until desire affect ach

## 2021-12-04 NOTE — PROGRESS NOTES
HPI:  Patient presents with:  Derm Problem: microderm      Carolyne Rojo is a 80year old female who presents for:    Cosmetic microdermabrasion #1    See ROS below    Current Outpatient Medications   Medication Sig Dispense Refill   • fluorouracil 5 % REDUCTION OF LARGE BREAST      2018    • REDUCTION RIGHT  2018/2019     No Known Allergies   Social History:    Social History    Tobacco Use      Smoking status: Never Smoker      Smokeless tobacco: Never Used    Vaping Use      Vaping Use: Never used recommendations and agrees to the above plan. Follow up: as above    No orders of the defined types were placed in this encounter.       Meds & Refills for this Visit:  Requested Prescriptions     Signed Prescriptions Disp Refills   • fluorouracil 5 % Exte

## 2021-12-04 NOTE — TELEPHONE ENCOUNTER
I did an office visit as well as cosmetic-microderm, they are on same encounter now, if you still can still-split encounters!

## 2021-12-08 NOTE — TELEPHONE ENCOUNTER
I was able to add the patient to Dr. Watkins Frame schedule at 8 AM on 12/3/21 (same days as her microderm). This is the only way the schedule would allow me to add the OV. Patient is checked in and documentation can be transferred to the new encounter.

## 2021-12-09 NOTE — PROGRESS NOTES
HPI:  Patient presents with:  Derm Problem  Hemorrhoids        Luba Pimentel is a 72year old female who presents with complains of:        Hemorrhoids, painful, no bleeding     Dry skin, excessive  Located on the face/upper lip.   Duration: months > 1 y SITE LEFT (CPT=19281)   1997   • OTHER SURGICAL HISTORY         SILVESTRE SIDDIQUI 2004    • OTHER SURGICAL HISTORY         pelvic floor lift 2016 with hysterctomy and removal of one ovary, the other not found due to scar tissue.    • REDUCTION LEFT   2018/2019   • here for:     Additional Assessment and Plan:  1.  Hemorrhoids, unspecified hemorrhoid type  Ext +/- int, anusol supp and cream as below, f/u prn  - hydrocortisone (ANUSOL-HC) 2.5 % External Cream; Apply to perianal area bid x 5-7 days prn for hemorrhoids

## 2021-12-10 ENCOUNTER — OFFICE VISIT (OUTPATIENT)
Dept: SURGERY | Facility: CLINIC | Age: 71
End: 2021-12-10
Payer: COMMERCIAL

## 2021-12-10 DIAGNOSIS — Z90.13 ABSENCE OF BOTH BREASTS: Primary | ICD-10-CM

## 2021-12-10 PROCEDURE — 99024 POSTOP FOLLOW-UP VISIT: CPT | Performed by: SURGERY

## 2021-12-10 NOTE — PROGRESS NOTES
Janna Mark is a 72year old female who presents today for a follow-up. She denies fever and chills. She denies nausea, vomiting, diarrhea or constipation.      Physical Examination:  Breasts: Bilateral breast incisions are clean dry and intact.   Gretchen Bowen

## 2021-12-14 ENCOUNTER — OFFICE VISIT (OUTPATIENT)
Dept: HEMATOLOGY/ONCOLOGY | Facility: HOSPITAL | Age: 71
End: 2021-12-14
Payer: COMMERCIAL

## 2021-12-14 DIAGNOSIS — Z85.3 PERSONAL HISTORY OF BREAST CANCER: Primary | ICD-10-CM

## 2021-12-14 DIAGNOSIS — Z08 ENCOUNTER FOR FOLLOW-UP EXAMINATION AFTER COMPLETED TREATMENT FOR MALIGNANT NEOPLASM: ICD-10-CM

## 2021-12-14 DIAGNOSIS — Z71.9 COUNSELING, UNSPECIFIED: ICD-10-CM

## 2021-12-14 PROCEDURE — 99215 OFFICE O/P EST HI 40 MIN: CPT | Performed by: NURSE PRACTITIONER
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
## 2021-12-15 ENCOUNTER — OFFICE VISIT (OUTPATIENT)
Dept: SURGERY | Facility: CLINIC | Age: 71
End: 2021-12-15
Payer: COMMERCIAL

## 2021-12-15 VITALS — HEART RATE: 81 BPM | DIASTOLIC BLOOD PRESSURE: 74 MMHG | SYSTOLIC BLOOD PRESSURE: 112 MMHG

## 2021-12-15 DIAGNOSIS — R31.29 MICROHEMATURIA: Primary | ICD-10-CM

## 2021-12-15 PROCEDURE — 3078F DIAST BP <80 MM HG: CPT | Performed by: NURSE PRACTITIONER

## 2021-12-15 PROCEDURE — 3074F SYST BP LT 130 MM HG: CPT | Performed by: NURSE PRACTITIONER

## 2021-12-15 PROCEDURE — 99243 OFF/OP CNSLTJ NEW/EST LOW 30: CPT | Performed by: NURSE PRACTITIONER

## 2021-12-15 RX ORDER — PHENOL 1.4 %
600 AEROSOL, SPRAY (ML) MUCOUS MEMBRANE
COMMUNITY

## 2021-12-15 RX ORDER — B-COMPLEX WITH VITAMIN C
TABLET ORAL DAILY
COMMUNITY

## 2021-12-15 RX ORDER — DIMENHYDRINATE 50 MG
1000 TABLET ORAL DAILY
COMMUNITY

## 2021-12-15 NOTE — PROGRESS NOTES
Subjective:     Patient ID: Wilder Roberts is a 80year old female. HPI     Patient is a 80year old female who presents to the clinic for a consult at the request of Dr. Alverto Benítez regarding microhematuria.   Past medical history of GERD a tablet (2.5 mg total) by mouth daily. (Patient not taking: Reported on 12/14/2021) 90 tablet 1   • HYDROcodone-acetaminophen 5-325 MG Oral Tab Take 1-2 tablets by mouth every 6 (six) hours as needed for Pain.  (Patient not taking: Reported on 12/14/2021) 40 Smokeless tobacco: Never Used    Vaping Use      Vaping Use: Never used    Alcohol use: Yes      Alcohol/week: 1.0 standard drink      Types: 1 Glasses of wine per week    Drug use: Never       Objective:   Physical Exam  HENT:      Head: Normocephalic. types were placed in this encounter.       Meds This Visit:  Requested Prescriptions      No prescriptions requested or ordered in this encounter       Imaging & Referrals:  None

## 2021-12-15 NOTE — PATIENT INSTRUCTIONS
Urine today for UA, urine culture, and urine cytology. This is to check the urine for any abnormal cells. CT urogram, this is a detailed imaging study to take a look at the kidneys and ureters.   Please complete this prior to your cystoscopy appointment

## 2021-12-17 ENCOUNTER — OFFICE VISIT (OUTPATIENT)
Dept: SURGERY | Facility: CLINIC | Age: 71
End: 2021-12-17
Payer: COMMERCIAL

## 2021-12-17 DIAGNOSIS — Z90.13 ABSENCE OF BOTH BREASTS: Primary | ICD-10-CM

## 2021-12-17 PROCEDURE — 99024 POSTOP FOLLOW-UP VISIT: CPT | Performed by: SURGERY

## 2021-12-17 NOTE — PROGRESS NOTES
Christian Starr is a 80year old female who presents today for a follow-up. She denies fever and chills. She denies nausea, vomiting, diarrhea or constipation. Physical Examination:  Breasts: Bilateral breast incisions are clean dry and intact.   Pro

## 2021-12-22 NOTE — PROGRESS NOTES
Breast Surgery Post-Operative Visit    Diagnosis: Left breast cancer status post bilateral nipple sparing mastectomies with left sentinel lymph node biopsy and tissue expander reconstruction on October 28, 2021.     Stage: Cancer Staging  Malignant neoplasm Hearing impairment     right ear hearing loss   • Hormone replacement therapy (HRT) 9/1/2021       Past Surgical History:   Procedure Laterality Date   • CONIZATION CERVIX,KNIFE/LASER  1993   • HYSTERECTOMY     • [place]FFERSON LOCALIZATION WIRE 1 SITE LEFT (CPT=1928 Oral Tab EC, Take 40 mg by mouth daily. , Disp: , Rfl:         Allergies:    No Known Allergies    Family History:   Family History   Problem Relation Age of Onset   • Breast Cancer Sister 47        metastatic at time of diagnosis   • Ovarian Cancer Materna swelling of the legs or chest pain while walking.     Breasts:  See history of present illness    Gastrointestinal:     There is no history of difficulty or pain with swallowing, +reflux symptoms, vomiting, dark or bloody stools, constipation, yellowing of her stated age. Her speech patterns and movements are normal. Her affect is appropriate. HEENT: The head is normocephalic. The neck is supple. The thyroid is not enlarged and is without palpable masses/nodules. There are no palpable masses.  The trachea concern. I personally reviewed the pathology which demonstrated invasive carcinoma of the left breast measuring 1.3 cm with negative margins and negative lymph node for which no further surgery will be needed.   2 of her drains were removed without complic

## 2022-01-05 ENCOUNTER — HOSPITAL ENCOUNTER (OUTPATIENT)
Dept: CT IMAGING | Facility: HOSPITAL | Age: 72
Discharge: HOME | End: 2022-01-05
Attending: NURSE PRACTITIONER
Payer: COMMERCIAL

## 2022-01-05 DIAGNOSIS — R31.29 MICROHEMATURIA: ICD-10-CM

## 2022-01-05 LAB — CREAT BLD-MCNC: 0.9 MG/DL

## 2022-01-05 PROCEDURE — 74178 CT ABD&PLV WO CNTR FLWD CNTR: CPT | Performed by: NURSE PRACTITIONER

## 2022-01-05 PROCEDURE — 82565 ASSAY OF CREATININE: CPT

## 2022-01-05 PROCEDURE — 76377 3D RENDER W/INTRP POSTPROCES: CPT | Performed by: NURSE PRACTITIONER

## 2022-01-14 ENCOUNTER — OFFICE VISIT (OUTPATIENT)
Dept: SURGERY | Facility: CLINIC | Age: 72
End: 2022-01-14
Payer: COMMERCIAL

## 2022-01-14 DIAGNOSIS — Z90.13 ABSENCE OF BOTH BREASTS: Primary | ICD-10-CM

## 2022-01-14 PROCEDURE — 99024 POSTOP FOLLOW-UP VISIT: CPT | Performed by: SURGERY

## 2022-01-14 NOTE — PROGRESS NOTES
Ashleigh Laboy is a 80year old female who presents today for a follow-up. She denies fever and chills. She denies nausea, vomiting, diarrhea or constipation.         Physical Examination:  Breasts: Bilateral breast incisions are clean dry and intact.   P

## 2022-01-21 ENCOUNTER — OFFICE VISIT (OUTPATIENT)
Dept: SURGERY | Facility: CLINIC | Age: 72
End: 2022-01-21
Payer: COMMERCIAL

## 2022-01-21 DIAGNOSIS — Z90.13 ABSENCE OF BOTH BREASTS: Primary | ICD-10-CM

## 2022-01-21 PROCEDURE — 99024 POSTOP FOLLOW-UP VISIT: CPT | Performed by: PHYSICIAN ASSISTANT

## 2022-01-21 NOTE — PROGRESS NOTES
Gee Quiroz is a 80year old female who presents today for a follow-up after bilateral nipple sparing mastectomy with left breast injection of blue dye for sentinel lymph node identification and left sentinel lymph node biopsy (Dr. Harini Brenner) and intraop saline was added bilaterally. This puts her at a total of 450cc of saline bilaterally. No seroma fluid was encountered. Neosporin and bandaids were placed. She tolerated this well.    She will follow up with Dr. Edyta Blair in 1 week for possible continued expan

## 2022-01-24 ENCOUNTER — PROCEDURE (OUTPATIENT)
Dept: SURGERY | Facility: CLINIC | Age: 72
End: 2022-01-24
Payer: COMMERCIAL

## 2022-01-24 VITALS
HEART RATE: 70 BPM | BODY MASS INDEX: 25 KG/M2 | DIASTOLIC BLOOD PRESSURE: 80 MMHG | WEIGHT: 152 LBS | SYSTOLIC BLOOD PRESSURE: 150 MMHG

## 2022-01-24 DIAGNOSIS — R31.29 MICROHEMATURIA: Primary | ICD-10-CM

## 2022-01-24 DIAGNOSIS — K44.9 HIATAL HERNIA: ICD-10-CM

## 2022-01-24 DIAGNOSIS — M47.816 SPONDYLOSIS OF LUMBAR REGION WITHOUT MYELOPATHY OR RADICULOPATHY: ICD-10-CM

## 2022-01-24 DIAGNOSIS — N28.1 RENAL CYST: ICD-10-CM

## 2022-01-24 PROCEDURE — 3077F SYST BP >= 140 MM HG: CPT | Performed by: UROLOGY

## 2022-01-24 PROCEDURE — 3079F DIAST BP 80-89 MM HG: CPT | Performed by: UROLOGY

## 2022-01-24 PROCEDURE — 99213 OFFICE O/P EST LOW 20 MIN: CPT | Performed by: UROLOGY

## 2022-01-24 PROCEDURE — 52000 CYSTOURETHROSCOPY: CPT | Performed by: UROLOGY

## 2022-01-24 RX ORDER — CIPROFLOXACIN 500 MG/1
500 TABLET, FILM COATED ORAL ONCE
Status: COMPLETED | OUTPATIENT
Start: 2022-01-24 | End: 2022-01-24

## 2022-01-24 RX ADMIN — CIPROFLOXACIN 500 MG: 500 TABLET, FILM COATED ORAL at 15:19:00

## 2022-01-24 NOTE — PATIENT INSTRUCTIONS
Jack Robertson M.D.      1.  If you have burning pain with urination, first try Tylenol or an NSAID such as Advil, Motrin, Aleve, ibuprofen; if such medications do not help and if you were to still have bothersome bu

## 2022-01-24 NOTE — PROGRESS NOTES
PREOPERATIVE DIAGNOSIS:     Microhematuria     POSTOP DIAGNOSIS:               The same, no tumors, stones, or CIS of the bladder or bladder neck    PROCEDURE:              Cystoscopy              ANESTHESIA:     2% Xylocaine jelly local;  also see above; this better or worse. She denies any flank pain, dysuria, or gross hematuria. She feels this is stable as she is currently asymptomatic.      So that we could have a meaningful and appropriate discussion with patient concerning further treatment options, I bladder cancer; patient denies any irritative voiding symptoms; I recommend observing the nonspecific vasculitis of the bladder mucosa.   I discussed with patient, that patient has undergone complete urological workup for microhematuria, and urine for cytol patient the benefits, risks, complications, side effects, reasons for, nature of, alternatives to the above treatment options, and I answered questions concerning them; patient understands all of this and decides to proceed with the following:     TREATMEN treatment options, answering questions about further and future treatment, and coordinating care, then documenting the visit component of today's  encounter.       By signing my name below, Layman Jey,  attest that this documentation has been prepared

## 2022-01-28 ENCOUNTER — OFFICE VISIT (OUTPATIENT)
Dept: SURGERY | Facility: CLINIC | Age: 72
End: 2022-01-28
Payer: COMMERCIAL

## 2022-01-28 ENCOUNTER — OFFICE VISIT (OUTPATIENT)
Dept: FAMILY MEDICINE CLINIC | Facility: CLINIC | Age: 72
End: 2022-01-28
Payer: COMMERCIAL

## 2022-01-28 ENCOUNTER — OFFICE VISIT (OUTPATIENT)
Dept: FAMILY MEDICINE CLINIC | Facility: CLINIC | Age: 72
End: 2022-01-28

## 2022-01-28 VITALS
HEART RATE: 80 BPM | WEIGHT: 152 LBS | HEIGHT: 65 IN | BODY MASS INDEX: 25.33 KG/M2 | DIASTOLIC BLOOD PRESSURE: 74 MMHG | SYSTOLIC BLOOD PRESSURE: 138 MMHG

## 2022-01-28 DIAGNOSIS — G47.09 OTHER INSOMNIA: Primary | ICD-10-CM

## 2022-01-28 DIAGNOSIS — Z90.13 H/O BILATERAL MASTECTOMY: ICD-10-CM

## 2022-01-28 DIAGNOSIS — C50.919 INFILTRATING DUCTAL CARCINOMA (HCC): ICD-10-CM

## 2022-01-28 DIAGNOSIS — Z90.13 ABSENCE OF BOTH BREASTS: Primary | ICD-10-CM

## 2022-01-28 DIAGNOSIS — R03.0 BORDERLINE HYPERTENSION: ICD-10-CM

## 2022-01-28 DIAGNOSIS — Z80.3 FAMILY HISTORY OF BREAST CANCER: ICD-10-CM

## 2022-01-28 DIAGNOSIS — R51.9 HEADACHE, UNSPECIFIED HEADACHE TYPE: ICD-10-CM

## 2022-01-28 DIAGNOSIS — R23.8 INTRINSIC AGING OF FACIAL SKIN: Primary | ICD-10-CM

## 2022-01-28 PROCEDURE — 99214 OFFICE O/P EST MOD 30 MIN: CPT | Performed by: FAMILY MEDICINE

## 2022-01-28 PROCEDURE — 3075F SYST BP GE 130 - 139MM HG: CPT | Performed by: FAMILY MEDICINE

## 2022-01-28 PROCEDURE — 99024 POSTOP FOLLOW-UP VISIT: CPT | Performed by: SURGERY

## 2022-01-28 PROCEDURE — 3008F BODY MASS INDEX DOCD: CPT | Performed by: FAMILY MEDICINE

## 2022-01-28 PROCEDURE — 3078F DIAST BP <80 MM HG: CPT | Performed by: FAMILY MEDICINE

## 2022-01-28 RX ORDER — LISINOPRIL 2.5 MG/1
2.5 TABLET ORAL DAILY
Qty: 30 TABLET | Refills: 1 | Status: SHIPPED | OUTPATIENT
Start: 2022-01-28 | End: 2022-02-08

## 2022-01-28 NOTE — PROGRESS NOTES
Katarina De Leon is a 80year old female who presents today for a follow-up. She denies fever and chills. She denies nausea, vomiting, diarrhea or constipation. Physical Examination:  Breasts: Bilateral breast incisions are clean dry and intact.   Raghu Rivas

## 2022-01-31 NOTE — PROCEDURES
HPI:   Patient presents with:  Derm Problem: botox (micrderm deferred due to COVID-mask wearing)       Duane Hesselbach is a 80year old female who presents for cosmetic facial BOTOX injections for normal skin aging effects.     She is without other complai Esophageal reflux    • Hearing impairment     right ear hearing loss   • Hormone replacement therapy (HRT) 9/1/2021     No Known Allergies    REVIEW OF SYSTEMS:   Pertinent positives and negatives noted in the the HPI.  Specifically:  GEN:  No fever or fati understanding of the risks and agrees to the plan above.)     Additional Assessment and Plan:  1. Intrinsic aging of facial skin  SA      The patient indicates understanding of the above recommendations and agrees to the above plan.   Follow up: as above

## 2022-02-03 ENCOUNTER — TELEPHONE (OUTPATIENT)
Dept: FAMILY MEDICINE CLINIC | Facility: CLINIC | Age: 72
End: 2022-02-03

## 2022-02-03 PROBLEM — Z80.3 FAMILY HISTORY OF BREAST CANCER: Status: ACTIVE | Noted: 2022-02-03

## 2022-02-03 PROBLEM — Z90.13 H/O BILATERAL MASTECTOMY: Status: ACTIVE | Noted: 2021-11-05

## 2022-02-03 PROBLEM — G47.09 OTHER INSOMNIA: Status: ACTIVE | Noted: 2022-02-03

## 2022-02-03 PROBLEM — C50.919 INFILTRATING DUCTAL CARCINOMA (HCC): Status: ACTIVE | Noted: 2022-02-03

## 2022-02-03 PROBLEM — R03.0 BORDERLINE HYPERTENSION: Status: ACTIVE | Noted: 2022-02-03

## 2022-02-03 PROBLEM — R51.9 HEADACHE, UNSPECIFIED HEADACHE TYPE: Status: ACTIVE | Noted: 2022-02-03

## 2022-02-04 ENCOUNTER — OFFICE VISIT (OUTPATIENT)
Dept: SURGERY | Facility: CLINIC | Age: 72
End: 2022-02-04
Payer: COMMERCIAL

## 2022-02-04 DIAGNOSIS — Z90.13 H/O BILATERAL MASTECTOMY: Primary | ICD-10-CM

## 2022-02-04 PROCEDURE — 99024 POSTOP FOLLOW-UP VISIT: CPT | Performed by: SURGERY

## 2022-02-07 ENCOUNTER — OFFICE VISIT (OUTPATIENT)
Dept: GASTROENTEROLOGY | Facility: CLINIC | Age: 72
End: 2022-02-07
Payer: COMMERCIAL

## 2022-02-07 ENCOUNTER — TELEPHONE (OUTPATIENT)
Dept: GASTROENTEROLOGY | Facility: CLINIC | Age: 72
End: 2022-02-07

## 2022-02-07 VITALS
SYSTOLIC BLOOD PRESSURE: 158 MMHG | WEIGHT: 149 LBS | BODY MASS INDEX: 24.83 KG/M2 | HEIGHT: 65 IN | DIASTOLIC BLOOD PRESSURE: 90 MMHG | HEART RATE: 85 BPM

## 2022-02-07 DIAGNOSIS — K64.8 OTHER HEMORRHOIDS: ICD-10-CM

## 2022-02-07 DIAGNOSIS — K21.9 GASTROESOPHAGEAL REFLUX DISEASE WITHOUT ESOPHAGITIS: Primary | ICD-10-CM

## 2022-02-07 PROCEDURE — 99244 OFF/OP CNSLTJ NEW/EST MOD 40: CPT | Performed by: INTERNAL MEDICINE

## 2022-02-07 PROCEDURE — 3077F SYST BP >= 140 MM HG: CPT | Performed by: INTERNAL MEDICINE

## 2022-02-07 PROCEDURE — 3008F BODY MASS INDEX DOCD: CPT | Performed by: INTERNAL MEDICINE

## 2022-02-07 PROCEDURE — 3080F DIAST BP >= 90 MM HG: CPT | Performed by: INTERNAL MEDICINE

## 2022-02-07 RX ORDER — POLYETHYLENE GLYCOL 3350, SODIUM CHLORIDE, SODIUM BICARBONATE, POTASSIUM CHLORIDE 420; 11.2; 5.72; 1.48 G/4L; G/4L; G/4L; G/4L
POWDER, FOR SOLUTION ORAL
Qty: 1 EACH | Refills: 0 | Status: SHIPPED | OUTPATIENT
Start: 2022-02-07

## 2022-02-07 RX ORDER — SODIUM, POTASSIUM,MAG SULFATES 17.5-3.13G
SOLUTION, RECONSTITUTED, ORAL ORAL
Qty: 1 EACH | Refills: 0 | Status: SHIPPED | OUTPATIENT
Start: 2022-02-07 | End: 2022-03-24

## 2022-02-07 NOTE — PATIENT INSTRUCTIONS
1. Schedule EGD/colonoscopy with MAC [Diagnosis: reflux, dysphagia, blood in the stool, hemorrhoids]    2.  bowel prep from pharmacy (split suprep or trilyte)    3. Continue all medications for procedure except    ** If MAC @ [facility]/NE:    - HOLD ACE/ARBs the night before and the day of the procedure(s) -- LISNOPRIL   - NO alcohol, recreational drugs nor erectile dysfunction mediations 24 hours before procedure(s)   - NO herbal supplements or weight loss medications x 7 days prior to the procedure(s)    ** If MAC @ [facility] or IV twilight - continue all medications as prescribed      4. Read all bowel prep instructions carefully    5. AVOID seeds, nuts, popcorn, raw fruits and vegetables (cooked is okay) for 2-3 days before procedure    6. You have to get COVID testing done 72 hours before the procedure date       >>>Please note: if you were prescribed Suprep for the bowel prep and it is too expensive or not covered by insurance, it is okay to substitute Trilyte (or any similar generic prep). This can be done by notifying the pharmacy or calling our office.

## 2022-02-07 NOTE — TELEPHONE ENCOUNTER
Scheduled for:  Colonoscopy [facility]  Provider Name:  Dr Kassy Cruz  Date:  02/15/2022  Location:  [facility]  Sedation:  MAC  Time:  4:00pm (pt is aware that [employer] will call the day before to confirm arrival time)  Prep:  Colyte  Meds/Allergies Reconciled?:  Physician reviewed  Diagnosis with codes:  GERD K21. 9; Hemorrhoids K64.8  Was patient informed to call insurance with codes (Y/N):  Y     Referral sent?:  NA  [address] [place] [address] or [facility] notified?:  I sent an electronic request to Endo Scheduling and received a confirmation today. Medication Orders: Pt is aware to NOT take iron pills, herbal meds and diet supplements for 7 days before exam. Also to NOT take any form of alcohol, recreational drugs and any forms of ED meds 24 hours before exam.     Misc Orders:       Further instructions given by staff:  I discussed the prep intructions with the patient in office which she verbally understood. Copy of instructions was handed to patient as well. Patient was also advised he will receive a call from PAT nurse 72-24 hours prior procedure to schedule Covid test done.

## 2022-02-09 NOTE — TELEPHONE ENCOUNTER
Left voicemail for patient to let her know that scheduled time of procedure has changed and that [facility] will be in contact for time of arrival  Let patient know if she had any questions she could reach out please transfer to [facility]

## 2022-02-11 ENCOUNTER — OFFICE VISIT (OUTPATIENT)
Dept: SURGERY | Facility: CLINIC | Age: 72
End: 2022-02-11
Payer: COMMERCIAL

## 2022-02-11 ENCOUNTER — LAB ENCOUNTER (OUTPATIENT)
Dept: LAB | Facility: HOSPITAL | Age: 72
End: 2022-02-11
Attending: FAMILY MEDICINE
Payer: COMMERCIAL

## 2022-02-11 ENCOUNTER — TELEPHONE (OUTPATIENT)
Dept: PHYSICAL THERAPY | Facility: HOSPITAL | Age: 72
End: 2022-02-11

## 2022-02-11 DIAGNOSIS — Z80.3 FAMILY HISTORY OF BREAST CANCER: ICD-10-CM

## 2022-02-11 DIAGNOSIS — Z90.13 H/O BILATERAL MASTECTOMY: Primary | ICD-10-CM

## 2022-02-11 DIAGNOSIS — C50.919 INFILTRATING DUCTAL CARCINOMA (HCC): ICD-10-CM

## 2022-02-11 LAB — CANCER AG125 SERPL-ACNC: 14.6 U/ML (ref ?–35)

## 2022-02-11 PROCEDURE — 99212 OFFICE O/P EST SF 10 MIN: CPT | Performed by: SURGERY

## 2022-02-11 PROCEDURE — 36415 COLL VENOUS BLD VENIPUNCTURE: CPT

## 2022-02-11 PROCEDURE — 86304 IMMUNOASSAY TUMOR CA 125: CPT

## 2022-02-11 NOTE — TELEPHONE ENCOUNTER
Spoke with Amie Garcia today re: following up for 3rd free PT screening. Amie Garcia feels that she is back to her baseline and not having any issues. She states that she used the foam pad for a while but that has resolved as well.

## 2022-02-12 ENCOUNTER — LAB REQUISITION (OUTPATIENT)
Dept: SURGERY | Age: 72
End: 2022-02-12
Payer: COMMERCIAL

## 2022-02-12 LAB — SARS-COV-2 RNA RESP QL NAA+PROBE: NOT DETECTED

## 2022-02-13 NOTE — PROGRESS NOTES
Braulio Kelsey,    Attached are the results of your recently performed labs/tests: All results are essentially within NORMAL limits.     Take care,     Juan Cardona MD  2/13/2022    (Report(s) are attached, future lab/test orders or any referrals are in your chart/MyChart or will be mailed to you)

## 2022-02-15 ENCOUNTER — SURGERY CENTER DOCUMENTATION (OUTPATIENT)
Dept: SURGERY | Age: 72
End: 2022-02-15

## 2022-02-15 ENCOUNTER — LAB REQUISITION (OUTPATIENT)
Dept: SURGERY | Age: 72
End: 2022-02-15
Payer: COMMERCIAL

## 2022-02-15 PROCEDURE — 88305 TISSUE EXAM BY PATHOLOGIST: CPT | Performed by: INTERNAL MEDICINE

## 2022-02-15 PROCEDURE — 45380 COLONOSCOPY AND BIOPSY: CPT | Performed by: INTERNAL MEDICINE

## 2022-02-15 PROCEDURE — 88312 SPECIAL STAINS GROUP 1: CPT | Performed by: INTERNAL MEDICINE

## 2022-02-15 PROCEDURE — 43239 EGD BIOPSY SINGLE/MULTIPLE: CPT | Performed by: INTERNAL MEDICINE

## 2022-02-16 ENCOUNTER — TELEPHONE (OUTPATIENT)
Dept: SURGERY | Facility: CLINIC | Age: 72
End: 2022-02-16

## 2022-02-16 NOTE — TELEPHONE ENCOUNTER
Left message that Dr. Erica Hartman does not see rectal prolapse and appt is cancelled. Offered appt with Dr. Daniel Stephens for Feb. 22 or 23 at [facility] location or Feb. 28th @ [facility].

## 2022-02-24 ENCOUNTER — TELEPHONE (OUTPATIENT)
Dept: SURGERY | Facility: CLINIC | Age: 72
End: 2022-02-24

## 2022-02-24 NOTE — TELEPHONE ENCOUNTER
Patient was called and offered surgery Date 5/25/22 at THE [facility]. Patient excepted. Pt reminded to complete medical clearance. Pt verbalized understanding.

## 2022-02-28 ENCOUNTER — OFFICE VISIT (OUTPATIENT)
Dept: SURGERY | Facility: CLINIC | Age: 72
End: 2022-02-28
Payer: COMMERCIAL

## 2022-02-28 VITALS — HEIGHT: 65 IN | WEIGHT: 149 LBS | BODY MASS INDEX: 24.83 KG/M2

## 2022-02-28 DIAGNOSIS — K64.3 GRADE IV HEMORRHOIDS: ICD-10-CM

## 2022-02-28 DIAGNOSIS — K62.3 RECTAL PROLAPSE: Primary | ICD-10-CM

## 2022-02-28 PROCEDURE — 46600 DIAGNOSTIC ANOSCOPY SPX: CPT | Performed by: SURGERY

## 2022-02-28 PROCEDURE — 99204 OFFICE O/P NEW MOD 45 MIN: CPT | Performed by: SURGERY

## 2022-02-28 NOTE — PATIENT INSTRUCTIONS
Plan outpatient complex hemorrhoidectomy at [facility] AND [facility].    Day surgery will call the day before with instructions. Avoid aspirin or NSAIDs for 5 days prior to surgery.

## 2022-03-01 ENCOUNTER — TELEPHONE (OUTPATIENT)
Dept: SURGERY | Facility: CLINIC | Age: 72
End: 2022-03-01

## 2022-03-03 PROBLEM — R23.8 INTRINSIC AGING OF FACIAL SKIN: Status: ACTIVE | Noted: 2022-03-03

## 2022-03-04 ENCOUNTER — OFFICE VISIT (OUTPATIENT)
Dept: FAMILY MEDICINE CLINIC | Facility: CLINIC | Age: 72
End: 2022-03-04

## 2022-03-04 DIAGNOSIS — R23.8 INTRINSIC AGING OF FACIAL SKIN: Primary | ICD-10-CM

## 2022-03-10 ENCOUNTER — TELEPHONE (OUTPATIENT)
Dept: SURGERY | Facility: CLINIC | Age: 72
End: 2022-03-10

## 2022-03-10 NOTE — TELEPHONE ENCOUNTER
MD RODRI,  Please confirm     Surgery case change same date 3-    From Exam under Anesthesia and Hemorrhoidectomy 26674 & [id number]  To   Exam under anesthesia and Transanal Mucosectomy - Muscular Plication  CPT codes 41219 and 07749      JOSE

## 2022-03-15 ENCOUNTER — OFFICE VISIT (OUTPATIENT)
Dept: FAMILY MEDICINE CLINIC | Facility: CLINIC | Age: 72
End: 2022-03-15

## 2022-03-15 DIAGNOSIS — R23.8 INTRINSIC AGING OF FACIAL SKIN: Primary | ICD-10-CM

## 2022-03-15 RX ORDER — ESZOPICLONE 2 MG/1
2 TABLET, FILM COATED ORAL NIGHTLY PRN
Qty: 90 TABLET | Refills: 0 | Status: SHIPPED | OUTPATIENT
Start: 2022-03-15

## 2022-03-21 ENCOUNTER — LAB ENCOUNTER (OUTPATIENT)
Dept: LAB | Age: 72
End: 2022-03-21
Attending: SURGERY
Payer: COMMERCIAL

## 2022-03-21 DIAGNOSIS — Z01.818 PREOP TESTING: ICD-10-CM

## 2022-03-21 LAB — SARS-COV-2 RNA RESP QL NAA+PROBE: NOT DETECTED

## 2022-03-24 ENCOUNTER — TELEPHONE (OUTPATIENT)
Dept: SURGERY | Facility: CLINIC | Age: 72
End: 2022-03-24

## 2022-03-24 ENCOUNTER — HOSPITAL ENCOUNTER (OUTPATIENT)
Facility: HOSPITAL | Age: 72
Setting detail: HOSPITAL OUTPATIENT SURGERY
Discharge: HOME | End: 2022-03-24
Attending: SURGERY | Admitting: SURGERY
Payer: COMMERCIAL

## 2022-03-24 ENCOUNTER — ANESTHESIA EVENT (OUTPATIENT)
Dept: SURGERY | Facility: HOSPITAL | Age: 72
End: 2022-03-24
Payer: COMMERCIAL

## 2022-03-24 ENCOUNTER — ANESTHESIA (OUTPATIENT)
Dept: SURGERY | Facility: HOSPITAL | Age: 72
End: 2022-03-24
Payer: COMMERCIAL

## 2022-03-24 VITALS
WEIGHT: 160 LBS | HEIGHT: 65 IN | SYSTOLIC BLOOD PRESSURE: 162 MMHG | HEART RATE: 92 BPM | DIASTOLIC BLOOD PRESSURE: 90 MMHG | OXYGEN SATURATION: 100 % | BODY MASS INDEX: 26.66 KG/M2 | TEMPERATURE: 97 F | RESPIRATION RATE: 16 BRPM

## 2022-03-24 DIAGNOSIS — Z01.818 PREOP TESTING: Primary | ICD-10-CM

## 2022-03-24 DIAGNOSIS — K62.3 RECTAL PROLAPSE: ICD-10-CM

## 2022-03-24 DIAGNOSIS — K64.3 GRADE IV HEMORRHOIDS: ICD-10-CM

## 2022-03-24 PROCEDURE — 0DJD8ZZ INSPECTION OF LOWER INTESTINAL TRACT, VIA NATURAL OR ARTIFICIAL OPENING ENDOSCOPIC: ICD-10-PCS | Performed by: SURGERY

## 2022-03-24 PROCEDURE — 45505 REPAIR OF RECTUM: CPT | Performed by: SURGERY

## 2022-03-24 PROCEDURE — 0DBP7ZZ EXCISION OF RECTUM, VIA NATURAL OR ARTIFICIAL OPENING: ICD-10-PCS | Performed by: SURGERY

## 2022-03-24 RX ORDER — SODIUM CHLORIDE, SODIUM LACTATE, POTASSIUM CHLORIDE, CALCIUM CHLORIDE 600; 310; 30; 20 MG/100ML; MG/100ML; MG/100ML; MG/100ML
INJECTION, SOLUTION INTRAVENOUS CONTINUOUS
Status: DISCONTINUED | OUTPATIENT
Start: 2022-03-24 | End: 2022-03-24

## 2022-03-24 RX ORDER — MORPHINE SULFATE 10 MG/ML
6 INJECTION, SOLUTION INTRAMUSCULAR; INTRAVENOUS EVERY 10 MIN PRN
Status: DISCONTINUED | OUTPATIENT
Start: 2022-03-24 | End: 2022-03-24

## 2022-03-24 RX ORDER — NALOXONE HYDROCHLORIDE 0.4 MG/ML
80 INJECTION, SOLUTION INTRAMUSCULAR; INTRAVENOUS; SUBCUTANEOUS AS NEEDED
Status: DISCONTINUED | OUTPATIENT
Start: 2022-03-24 | End: 2022-03-24

## 2022-03-24 RX ORDER — HALOPERIDOL 5 MG/ML
0.25 INJECTION INTRAMUSCULAR ONCE AS NEEDED
Status: DISCONTINUED | OUTPATIENT
Start: 2022-03-24 | End: 2022-03-24

## 2022-03-24 RX ORDER — PROCHLORPERAZINE EDISYLATE 5 MG/ML
5 INJECTION INTRAMUSCULAR; INTRAVENOUS ONCE AS NEEDED
Status: DISCONTINUED | OUTPATIENT
Start: 2022-03-24 | End: 2022-03-24

## 2022-03-24 RX ORDER — HYDROCODONE BITARTRATE AND ACETAMINOPHEN 10; 325 MG/1; MG/1
1 TABLET ORAL EVERY 6 HOURS PRN
Qty: 20 TABLET | Refills: 0 | Status: SHIPPED | OUTPATIENT
Start: 2022-03-24

## 2022-03-24 RX ORDER — ROCURONIUM BROMIDE 10 MG/ML
INJECTION, SOLUTION INTRAVENOUS AS NEEDED
Status: DISCONTINUED | OUTPATIENT
Start: 2022-03-24 | End: 2022-03-24 | Stop reason: SURG

## 2022-03-24 RX ORDER — LIDOCAINE HYDROCHLORIDE 10 MG/ML
INJECTION, SOLUTION EPIDURAL; INFILTRATION; INTRACAUDAL; PERINEURAL AS NEEDED
Status: DISCONTINUED | OUTPATIENT
Start: 2022-03-24 | End: 2022-03-24 | Stop reason: SURG

## 2022-03-24 RX ORDER — ONDANSETRON 2 MG/ML
INJECTION INTRAMUSCULAR; INTRAVENOUS AS NEEDED
Status: DISCONTINUED | OUTPATIENT
Start: 2022-03-24 | End: 2022-03-24 | Stop reason: SURG

## 2022-03-24 RX ORDER — DOCUSATE SODIUM 100 MG/1
100 CAPSULE, LIQUID FILLED ORAL 2 TIMES DAILY
Qty: 30 CAPSULE | Refills: 0 | Status: SHIPPED | OUTPATIENT
Start: 2022-03-24

## 2022-03-24 RX ORDER — HYDROMORPHONE HYDROCHLORIDE 1 MG/ML
0.2 INJECTION, SOLUTION INTRAMUSCULAR; INTRAVENOUS; SUBCUTANEOUS EVERY 5 MIN PRN
Status: DISCONTINUED | OUTPATIENT
Start: 2022-03-24 | End: 2022-03-24

## 2022-03-24 RX ORDER — HYDROCODONE BITARTRATE AND ACETAMINOPHEN 5; 325 MG/1; MG/1
2 TABLET ORAL AS NEEDED
Status: COMPLETED | OUTPATIENT
Start: 2022-03-24 | End: 2022-03-24

## 2022-03-24 RX ORDER — DIBUCAINE 0.28 G/28G
OINTMENT TOPICAL AS NEEDED
Status: DISCONTINUED | OUTPATIENT
Start: 2022-03-24 | End: 2022-03-24 | Stop reason: HOSPADM

## 2022-03-24 RX ORDER — EPHEDRINE SULFATE 50 MG/ML
INJECTION, SOLUTION INTRAVENOUS AS NEEDED
Status: DISCONTINUED | OUTPATIENT
Start: 2022-03-24 | End: 2022-03-24 | Stop reason: SURG

## 2022-03-24 RX ORDER — CEFAZOLIN SODIUM/WATER 2 G/20 ML
2 SYRINGE (ML) INTRAVENOUS ONCE
Status: COMPLETED | OUTPATIENT
Start: 2022-03-24 | End: 2022-03-24

## 2022-03-24 RX ORDER — ONDANSETRON 2 MG/ML
4 INJECTION INTRAMUSCULAR; INTRAVENOUS ONCE AS NEEDED
Status: DISCONTINUED | OUTPATIENT
Start: 2022-03-24 | End: 2022-03-24

## 2022-03-24 RX ORDER — BUPIVACAINE HYDROCHLORIDE AND EPINEPHRINE 2.5; 5 MG/ML; UG/ML
INJECTION, SOLUTION INFILTRATION; PERINEURAL AS NEEDED
Status: DISCONTINUED | OUTPATIENT
Start: 2022-03-24 | End: 2022-03-24 | Stop reason: HOSPADM

## 2022-03-24 RX ORDER — MORPHINE SULFATE 4 MG/ML
2 INJECTION, SOLUTION INTRAMUSCULAR; INTRAVENOUS EVERY 10 MIN PRN
Status: DISCONTINUED | OUTPATIENT
Start: 2022-03-24 | End: 2022-03-24

## 2022-03-24 RX ORDER — HYDROMORPHONE HYDROCHLORIDE 1 MG/ML
0.6 INJECTION, SOLUTION INTRAMUSCULAR; INTRAVENOUS; SUBCUTANEOUS EVERY 5 MIN PRN
Status: DISCONTINUED | OUTPATIENT
Start: 2022-03-24 | End: 2022-03-24

## 2022-03-24 RX ORDER — HYDROMORPHONE HYDROCHLORIDE 1 MG/ML
0.4 INJECTION, SOLUTION INTRAMUSCULAR; INTRAVENOUS; SUBCUTANEOUS EVERY 5 MIN PRN
Status: DISCONTINUED | OUTPATIENT
Start: 2022-03-24 | End: 2022-03-24

## 2022-03-24 RX ORDER — HYDROCODONE BITARTRATE AND ACETAMINOPHEN 5; 325 MG/1; MG/1
1 TABLET ORAL AS NEEDED
Status: COMPLETED | OUTPATIENT
Start: 2022-03-24 | End: 2022-03-24

## 2022-03-24 RX ORDER — DEXAMETHASONE SODIUM PHOSPHATE 4 MG/ML
VIAL (ML) INJECTION AS NEEDED
Status: DISCONTINUED | OUTPATIENT
Start: 2022-03-24 | End: 2022-03-24 | Stop reason: SURG

## 2022-03-24 RX ORDER — MORPHINE SULFATE 4 MG/ML
4 INJECTION, SOLUTION INTRAMUSCULAR; INTRAVENOUS EVERY 10 MIN PRN
Status: DISCONTINUED | OUTPATIENT
Start: 2022-03-24 | End: 2022-03-24

## 2022-03-24 RX ORDER — ACETAMINOPHEN 500 MG
1000 TABLET ORAL ONCE
Status: COMPLETED | OUTPATIENT
Start: 2022-03-24 | End: 2022-03-24

## 2022-03-24 RX ORDER — GUAIFENESIN 100 MG/5ML
1 SYRUP ORAL EVERY 6 HOURS PRN
Qty: 10 TABLET | Refills: 0 | Status: SHIPPED | OUTPATIENT
Start: 2022-03-24

## 2022-03-24 RX ADMIN — DEXAMETHASONE SODIUM PHOSPHATE 4 MG: 4 MG/ML VIAL (ML) INJECTION at 08:39:00

## 2022-03-24 RX ADMIN — SODIUM CHLORIDE, SODIUM LACTATE, POTASSIUM CHLORIDE, CALCIUM CHLORIDE: 600; 310; 30; 20 INJECTION, SOLUTION INTRAVENOUS at 08:24:00

## 2022-03-24 RX ADMIN — EPHEDRINE SULFATE 5 MG: 50 INJECTION, SOLUTION INTRAVENOUS at 08:44:00

## 2022-03-24 RX ADMIN — LIDOCAINE HYDROCHLORIDE 50 MG: 10 INJECTION, SOLUTION EPIDURAL; INFILTRATION; INTRACAUDAL; PERINEURAL at 08:28:00

## 2022-03-24 RX ADMIN — ONDANSETRON 4 MG: 2 INJECTION INTRAMUSCULAR; INTRAVENOUS at 08:39:00

## 2022-03-24 RX ADMIN — CEFAZOLIN SODIUM/WATER 2 G: 2 G/20 ML SYRINGE (ML) INTRAVENOUS at 08:32:00

## 2022-03-24 RX ADMIN — ROCURONIUM BROMIDE 5 MG: 10 INJECTION, SOLUTION INTRAVENOUS at 08:28:00

## 2022-03-24 RX ADMIN — SODIUM CHLORIDE, SODIUM LACTATE, POTASSIUM CHLORIDE, CALCIUM CHLORIDE: 600; 310; 30; 20 INJECTION, SOLUTION INTRAVENOUS at 09:41:00

## 2022-03-24 RX ADMIN — EPHEDRINE SULFATE 10 MG: 50 INJECTION, SOLUTION INTRAVENOUS at 09:02:00

## 2022-03-24 NOTE — OPERATIVE REPORT
[facility]    PATIENT'S NAME: Dylon [facility]   ATTENDING PHYSICIAN: Noemí Bah MD   OPERATING PHYSICIAN: Noemí Bah MD   PATIENT ACCOUNT#:   [de-identified]    LOCATION:  [facility] [address] PACU 4 [facility] 10  MEDICAL RECORD #:   [id number]       YOB: 1950  ADMISSION DATE:       03/24/2022      OPERATION DATE:  03/24/2022    OPERATIVE REPORT      PREOPERATIVE DIAGNOSIS:  Mucosal prolapse. POSTOPERATIVE DIAGNOSIS:  Mucosal prolapse. PROCEDURE:  Transanal mucosectomy, plication of muscle, exam under anesthesia, anoscopy. ASSISTANT:  ELI Lopez    ESTIMATED BLOOD LOSS:  10 mL. COMPLICATIONS:  None. ANESTHESIA:  General.    DISPOSITION:  To Recovery, tolerated well. INDICATIONS:  Patient is 80 with a partial rectal prolapse of mucosa. Consent obtained. OPERATIVE TECHNIQUE:  She is taken to surgery. She is placed in a prone position, is prepped and draped in the usual sterile fashion. The anoscope is inserted. There is really no evidence of internal hemorrhoids. She has a mucosal prolapse of approximately 3 cm. The [facility] [place] retractor is placed. A mixture of 4% Marcaine with epinephrine is infiltrated just 2 cm above the dentate line. The mucosa is marked with electrocautery circumferentially and mucosectomy is performed to approximately a length of 5 cm. Plication of the muscles performed using interrupted 2-0 Vicryl. A mucosa-to-mucosa approximation is performed using circumferential interrupted 3-0 Vicryl sutures. There is no bleeding identified. Dibucaine and Gelfoam are placed into the anal canal and the retractors are removed. She tolerated the procedure well.      Dictated By Noemí Bah MD  d: 03/24/2022 09:31:03  t: 03/24/2022 09:45:20  Job [id number]  GL/    cc: Hailey Cerna MD

## 2022-03-24 NOTE — ANESTHESIA POSTPROCEDURE EVALUATION
Patient: Hector Murry    Procedure Summary     Date: 03/24/22 Room / Location: [address] [place] [address] MAIN OR 02 / [address] [facility] MAIN OR    Anesthesia Start: 5228 Anesthesia Stop: 7957    Procedure: exam under anesthesia and transanal mucosectomy and muscular plication (N/A Anus) Diagnosis:       Rectal prolapse      Grade IV hemorrhoids      (Rectal prolapse [K62. 3]Grade IV hemorrhoids [K64.3])    Surgeons: Jaison Dotson MD Anesthesiologist: Anmol Andrade MD    Anesthesia Type: general ASA Status: 2          Anesthesia Type: general    Vitals Value Taken Time   /74 03/24/22 0942   Temp 96.8 03/24/22 0942   Pulse 97 03/24/22 0942   Resp 16 03/24/22 0942   SpO2 100 03/24/22 0942       [facility] AN Post Evaluation:   Patient Evaluated in PACU  Patient Participation: complete - patient participated  Level of Consciousness: awake and alert  Pain Score: 0  Pain Management: adequate  Airway Patency:patent  Dental exam unchanged from preop  Yes    Cardiovascular Status: acceptable  Respiratory Status: acceptable  Postoperative Hydration acceptable      Mango De La Cruz CRNA  3/24/2022 9:42 AM

## 2022-03-24 NOTE — ANESTHESIA PROCEDURE NOTES
Airway  Date/Time: 3/24/2022 8:29 AM  Urgency: Elective      General Information and Staff    Patient location during procedure: OR  Anesthesiologist: Camille Mccabe MD  Resident/CRNA: Miguel Robles CRNA  Performed: CRNA     Indications and Patient Condition  Indications for airway management: anesthesia  Sedation level: deep  Preoxygenated: yes  Patient position: sniffing  Mask difficulty assessment: 1 - vent by mask    Final Airway Details  Final airway type: endotracheal airway      Successful airway: ETT  Cuffed: yes   Successful intubation technique: direct laryngoscopy  Facilitating devices/methods: cricoid pressure  Endotracheal tube insertion site: oral  Blade: Margaux  Blade size: #3  ETT size (mm): 7.0    Cormack-Lehane Classification: grade I - full view of glottis  Placement verified by: chest auscultation and capnometry   Measured from: teeth  ETT to teeth (cm): 21  Number of attempts at approach: 1

## 2022-03-24 NOTE — INTERVAL H&P NOTE
Pre-op Diagnosis: Rectal prolapse [K62.3]  Grade IV hemorrhoids [K64.3]    The above referenced H&P was reviewed by Tenisha Velasquez MD on 3/24/2022, the patient was examined and no significant changes have occurred in the patient's condition since the H&P was performed. I discussed with the patient and/or legal representative the potential benefits, risks and side effects of this procedure; the likelihood of the patient achieving goals; and potential problems that might occur during recuperation. I discussed reasonable alternatives to the procedure, including risks, benefits and side effects related to the alternatives and risks related to not receiving this procedure. We will proceed with procedure as planned. Risk-benefit options explained. She understands and is in agreement with the procedure.

## 2022-03-24 NOTE — TELEPHONE ENCOUNTER
[facility] requesting call back state that they do not have Norco (brand) were wondering if it can be generic.  Please advise

## 2022-03-24 NOTE — PROGRESS NOTES
Pt rcvd text from [employer] that prescribed [place] is out of stock.   Contacted Dr. Saba Magana, obtained paper script for patient to have filled for pain relief

## 2022-04-04 ENCOUNTER — OFFICE VISIT (OUTPATIENT)
Dept: SURGERY | Facility: CLINIC | Age: 72
End: 2022-04-04
Payer: COMMERCIAL

## 2022-04-04 DIAGNOSIS — Z98.890 POST-OPERATIVE STATE: Primary | ICD-10-CM

## 2022-04-04 PROCEDURE — 99024 POSTOP FOLLOW-UP VISIT: CPT | Performed by: SURGERY

## 2022-04-04 RX ORDER — HYDROCODONE BITARTRATE AND ACETAMINOPHEN 5; 325 MG/1; MG/1
1 TABLET ORAL EVERY 6 HOURS PRN
Qty: 20 TABLET | Refills: 0 | Status: SHIPPED | OUTPATIENT
Start: 2022-04-04

## 2022-04-04 NOTE — PROGRESS NOTES
Postoperative Patient Follow-up      4/4/2022    HPI      Stephanie Mensah is a 80year old female postop visit after [place] procedure. Pathology checked and benign. Exam  Anus is healing well. Small 2 cm area of mucosal swelling abscess      Assessment/Plan  Assessment   Post-operative state  (primary encounter diagnosis)    Doing well postop.   Refill prescription for Norco.  Follow-up after her trip in 2 weeks if problems         Eula Crawford MD

## 2022-04-29 ENCOUNTER — OFFICE VISIT (OUTPATIENT)
Dept: FAMILY MEDICINE CLINIC | Facility: CLINIC | Age: 72
End: 2022-04-29
Payer: COMMERCIAL

## 2022-04-29 VITALS
DIASTOLIC BLOOD PRESSURE: 60 MMHG | HEIGHT: 65 IN | HEART RATE: 87 BPM | BODY MASS INDEX: 26.39 KG/M2 | SYSTOLIC BLOOD PRESSURE: 108 MMHG | WEIGHT: 158.38 LBS | OXYGEN SATURATION: 98 %

## 2022-04-29 DIAGNOSIS — R03.0 BORDERLINE HYPERTENSION: ICD-10-CM

## 2022-04-29 DIAGNOSIS — Z01.818 PREOP GENERAL PHYSICAL EXAM: Primary | ICD-10-CM

## 2022-04-29 DIAGNOSIS — K21.9 GASTROESOPHAGEAL REFLUX DISEASE WITHOUT ESOPHAGITIS: ICD-10-CM

## 2022-04-29 DIAGNOSIS — C50.919 INFILTRATING DUCTAL CARCINOMA (HCC): ICD-10-CM

## 2022-04-29 DIAGNOSIS — G47.09 OTHER INSOMNIA: ICD-10-CM

## 2022-04-29 DIAGNOSIS — Z79.899 LONG-TERM USE OF HIGH-RISK MEDICATION: ICD-10-CM

## 2022-04-29 DIAGNOSIS — F41.8 SITUATIONAL ANXIETY: ICD-10-CM

## 2022-04-29 PROCEDURE — 3008F BODY MASS INDEX DOCD: CPT | Performed by: FAMILY MEDICINE

## 2022-04-29 PROCEDURE — 3078F DIAST BP <80 MM HG: CPT | Performed by: FAMILY MEDICINE

## 2022-04-29 PROCEDURE — 3074F SYST BP LT 130 MM HG: CPT | Performed by: FAMILY MEDICINE

## 2022-04-29 PROCEDURE — 99244 OFF/OP CNSLTJ NEW/EST MOD 40: CPT | Performed by: FAMILY MEDICINE

## 2022-05-03 ENCOUNTER — TELEPHONE (OUTPATIENT)
Dept: FAMILY MEDICINE CLINIC | Facility: CLINIC | Age: 72
End: 2022-05-03

## 2022-05-04 NOTE — TELEPHONE ENCOUNTER
HPI:    Patient ID: Hi Beth is a 36year old female. HPI  Patient is here for follow-up of depression and anxiety. She also states of having some fullness in the ears and some postnasal drip.   She has started Prozac 20 mg daily and no ace When pre-op labs and EKG done and if all WNL, fax w my note to Dr. Sudeep Rhoades (surgery is 5/25/22), o/w I'll advise; [address]! abdomen is soft, non-tender, non-distended, no hepatosplenomegaly, no abnormal aortic pulsation. PSYCH: Mood and affect are normal.  Judgement and insight are intact. No suicidal thoughts.             ASSESSMENT/PLAN:   Depression, unspecified depression

## 2022-05-10 ENCOUNTER — EKG ENCOUNTER (OUTPATIENT)
Dept: LAB | Facility: HOSPITAL | Age: 72
End: 2022-05-10
Attending: FAMILY MEDICINE
Payer: COMMERCIAL

## 2022-05-10 ENCOUNTER — OFFICE VISIT (OUTPATIENT)
Dept: HEMATOLOGY/ONCOLOGY | Facility: HOSPITAL | Age: 72
End: 2022-05-10
Attending: INTERNAL MEDICINE
Payer: COMMERCIAL

## 2022-05-10 VITALS
BODY MASS INDEX: 26.16 KG/M2 | HEIGHT: 65 IN | SYSTOLIC BLOOD PRESSURE: 121 MMHG | TEMPERATURE: 99 F | RESPIRATION RATE: 16 BRPM | WEIGHT: 157 LBS | DIASTOLIC BLOOD PRESSURE: 75 MMHG | OXYGEN SATURATION: 99 % | HEART RATE: 70 BPM

## 2022-05-10 DIAGNOSIS — Z01.818 PREOP GENERAL PHYSICAL EXAM: ICD-10-CM

## 2022-05-10 DIAGNOSIS — C50.112 MALIGNANT NEOPLASM OF CENTRAL PORTION OF LEFT BREAST IN FEMALE, ESTROGEN RECEPTOR POSITIVE (HCC): Primary | ICD-10-CM

## 2022-05-10 DIAGNOSIS — Z79.811 ENCOUNTER FOR MONITORING AROMATASE INHIBITOR THERAPY: ICD-10-CM

## 2022-05-10 DIAGNOSIS — Z08 ENCOUNTER FOR FOLLOW-UP EXAMINATION AFTER COMPLETED TREATMENT FOR MALIGNANT NEOPLASM: ICD-10-CM

## 2022-05-10 DIAGNOSIS — R03.0 BORDERLINE HYPERTENSION: ICD-10-CM

## 2022-05-10 DIAGNOSIS — C50.919 INFILTRATING DUCTAL CARCINOMA (HCC): ICD-10-CM

## 2022-05-10 DIAGNOSIS — Z51.81 ENCOUNTER FOR MONITORING AROMATASE INHIBITOR THERAPY: ICD-10-CM

## 2022-05-10 DIAGNOSIS — Z17.0 MALIGNANT NEOPLASM OF CENTRAL PORTION OF LEFT BREAST IN FEMALE, ESTROGEN RECEPTOR POSITIVE (HCC): Primary | ICD-10-CM

## 2022-05-10 LAB
ALBUMIN SERPL-MCNC: 3.9 G/DL (ref 3.4–5)
ALBUMIN/GLOB SERPL: 1.1 {RATIO} (ref 1–2)
ALP LIVER SERPL-CCNC: 73 U/L
ALT SERPL-CCNC: 18 U/L
ANION GAP SERPL CALC-SCNC: 7 MMOL/L (ref 0–18)
AST SERPL-CCNC: 20 U/L (ref 15–37)
BASOPHILS # BLD AUTO: 0.04 X10(3) UL (ref 0–0.2)
BASOPHILS NFR BLD AUTO: 0.9 %
BILIRUB SERPL-MCNC: 0.7 MG/DL (ref 0.1–2)
BUN BLD-MCNC: 17 MG/DL (ref 7–18)
BUN/CREAT SERPL: 18.1 (ref 10–20)
CALCIUM BLD-MCNC: 10.6 MG/DL (ref 8.5–10.1)
CHLORIDE SERPL-SCNC: 106 MMOL/L (ref 98–112)
CO2 SERPL-SCNC: 28 MMOL/L (ref 21–32)
CREAT BLD-MCNC: 0.94 MG/DL
DEPRECATED RDW RBC AUTO: 42.3 FL (ref 35.1–46.3)
EOSINOPHIL # BLD AUTO: 0.06 X10(3) UL (ref 0–0.7)
EOSINOPHIL NFR BLD AUTO: 1.3 %
ERYTHROCYTE [DISTWIDTH] IN BLOOD BY AUTOMATED COUNT: 12.1 % (ref 11–15)
FASTING STATUS PATIENT QL REPORTED: NO
GLOBULIN PLAS-MCNC: 3.4 G/DL (ref 2.8–4.4)
GLUCOSE BLD-MCNC: 89 MG/DL (ref 70–99)
HCT VFR BLD AUTO: 37 %
HGB BLD-MCNC: 12.1 G/DL
IMM GRANULOCYTES # BLD AUTO: 0.01 X10(3) UL (ref 0–1)
IMM GRANULOCYTES NFR BLD: 0.2 %
LYMPHOCYTES # BLD AUTO: 1.83 X10(3) UL (ref 1–4)
LYMPHOCYTES NFR BLD AUTO: 41 %
MCH RBC QN AUTO: 30.6 PG (ref 26–34)
MCHC RBC AUTO-ENTMCNC: 32.7 G/DL (ref 31–37)
MCV RBC AUTO: 93.7 FL
MONOCYTES # BLD AUTO: 0.34 X10(3) UL (ref 0.1–1)
MONOCYTES NFR BLD AUTO: 7.6 %
NEUTROPHILS # BLD AUTO: 2.18 X10 (3) UL (ref 1.5–7.7)
NEUTROPHILS # BLD AUTO: 2.18 X10(3) UL (ref 1.5–7.7)
NEUTROPHILS NFR BLD AUTO: 49 %
OSMOLALITY SERPL CALC.SUM OF ELEC: 293 MOSM/KG (ref 275–295)
PLATELET # BLD AUTO: 257 10(3)UL (ref 150–450)
POTASSIUM SERPL-SCNC: 4.3 MMOL/L (ref 3.5–5.1)
PROT SERPL-MCNC: 7.3 G/DL (ref 6.4–8.2)
RBC # BLD AUTO: 3.95 X10(6)UL
SODIUM SERPL-SCNC: 141 MMOL/L (ref 136–145)
WBC # BLD AUTO: 4.5 X10(3) UL (ref 4–11)

## 2022-05-10 PROCEDURE — 93010 ELECTROCARDIOGRAM REPORT: CPT | Performed by: FAMILY MEDICINE

## 2022-05-10 PROCEDURE — 93005 ELECTROCARDIOGRAM TRACING: CPT

## 2022-05-10 PROCEDURE — 85025 COMPLETE CBC W/AUTO DIFF WBC: CPT

## 2022-05-10 PROCEDURE — 80053 COMPREHEN METABOLIC PANEL: CPT

## 2022-05-10 PROCEDURE — 36415 COLL VENOUS BLD VENIPUNCTURE: CPT

## 2022-05-10 PROCEDURE — 99214 OFFICE O/P EST MOD 30 MIN: CPT | Performed by: INTERNAL MEDICINE

## 2022-05-10 RX ORDER — VENLAFAXINE HYDROCHLORIDE 37.5 MG/1
37.5 CAPSULE, EXTENDED RELEASE ORAL DAILY
Qty: 30 CAPSULE | Refills: 6 | Status: SHIPPED | OUTPATIENT
Start: 2022-05-10 | End: 2022-12-06

## 2022-05-10 RX ORDER — LETROZOLE 2.5 MG/1
2.5 TABLET, FILM COATED ORAL DAILY
Qty: 90 TABLET | Refills: 3 | Status: SHIPPED | OUTPATIENT
Start: 2022-05-10

## 2022-05-13 ENCOUNTER — OFFICE VISIT (OUTPATIENT)
Dept: SURGERY | Facility: CLINIC | Age: 72
End: 2022-05-13
Payer: COMMERCIAL

## 2022-05-13 VITALS
DIASTOLIC BLOOD PRESSURE: 78 MMHG | HEART RATE: 87 BPM | TEMPERATURE: 98 F | WEIGHT: 156 LBS | OXYGEN SATURATION: 98 % | BODY MASS INDEX: 26 KG/M2 | RESPIRATION RATE: 18 BRPM | SYSTOLIC BLOOD PRESSURE: 125 MMHG

## 2022-05-13 DIAGNOSIS — C50.112 MALIGNANT NEOPLASM OF CENTRAL PORTION OF LEFT BREAST IN FEMALE, ESTROGEN RECEPTOR POSITIVE (HCC): Primary | ICD-10-CM

## 2022-05-13 DIAGNOSIS — Z17.0 MALIGNANT NEOPLASM OF CENTRAL PORTION OF LEFT BREAST IN FEMALE, ESTROGEN RECEPTOR POSITIVE (HCC): Primary | ICD-10-CM

## 2022-05-13 DIAGNOSIS — Z90.13 H/O BILATERAL MASTECTOMY: ICD-10-CM

## 2022-05-13 PROCEDURE — 99213 OFFICE O/P EST LOW 20 MIN: CPT | Performed by: SURGERY

## 2022-05-13 PROCEDURE — 3078F DIAST BP <80 MM HG: CPT | Performed by: SURGERY

## 2022-05-13 PROCEDURE — 3074F SYST BP LT 130 MM HG: CPT | Performed by: SURGERY

## 2022-05-16 NOTE — PROGRESS NOTES
Braulio Silva,    Attached are the results of your recently performed labs/tests: All results are essentially within NORMAL limits. EXCEPT:    Your calcium was mildly elevated. Please:   Recheck as below: In 1-2 months when you have recovered from your surgery. Follow up:  Pending the results. With me then.     Take care,     Dequan Doherty MD  5/16/2022    (Report(s) are attached, future lab/test orders or any referrals are in your chart/MyChart or will be mailed to you)

## 2022-05-20 ENCOUNTER — TELEPHONE (OUTPATIENT)
Dept: GASTROENTEROLOGY | Facility: CLINIC | Age: 72
End: 2022-05-20

## 2022-05-20 NOTE — TELEPHONE ENCOUNTER
Entered into Epic. Recall CLN in 10 years per Dr. Nathaniel Cohen. Last CLN done 02/15/2022. Recall entered into Patient Outreach for 02/15/2032. Health Maintenance updated. Patient seen in clinic last 02/07/2022. Postponed chart to 11/07/2022 to assist at this time with scheduling annual f/u for 02/2023.

## 2022-05-20 NOTE — TELEPHONE ENCOUNTER
----- Message from Stevan Cristobal MD sent at 5/19/2022  2:04 PM CDT -----  Follow up in clinic with me of ongoing symptoms or yearly  GI staff: please place recall in for colonoscopy in 10 years

## 2022-05-23 ENCOUNTER — LAB ENCOUNTER (OUTPATIENT)
Dept: LAB | Facility: HOSPITAL | Age: 72
End: 2022-05-23
Attending: SURGERY
Payer: COMMERCIAL

## 2022-05-23 DIAGNOSIS — Z01.812 ENCOUNTER FOR PREOPERATIVE SCREENING LABORATORY TESTING FOR COVID-19 VIRUS: ICD-10-CM

## 2022-05-23 DIAGNOSIS — Z20.822 ENCOUNTER FOR PREOPERATIVE SCREENING LABORATORY TESTING FOR COVID-19 VIRUS: ICD-10-CM

## 2022-05-23 LAB — SARS-COV-2 RNA RESP QL NAA+PROBE: NOT DETECTED

## 2022-05-25 ENCOUNTER — ANESTHESIA EVENT (OUTPATIENT)
Dept: SURGERY | Facility: HOSPITAL | Age: 72
End: 2022-05-25
Payer: COMMERCIAL

## 2022-05-25 ENCOUNTER — HOSPITAL ENCOUNTER (OUTPATIENT)
Facility: HOSPITAL | Age: 72
Setting detail: HOSPITAL OUTPATIENT SURGERY
Discharge: HOME | End: 2022-05-25
Attending: SURGERY | Admitting: SURGERY
Payer: COMMERCIAL

## 2022-05-25 ENCOUNTER — ANESTHESIA (OUTPATIENT)
Dept: SURGERY | Facility: HOSPITAL | Age: 72
End: 2022-05-25
Payer: COMMERCIAL

## 2022-05-25 VITALS
HEIGHT: 65 IN | DIASTOLIC BLOOD PRESSURE: 80 MMHG | SYSTOLIC BLOOD PRESSURE: 146 MMHG | BODY MASS INDEX: 26.33 KG/M2 | RESPIRATION RATE: 16 BRPM | HEART RATE: 96 BPM | WEIGHT: 158.06 LBS | OXYGEN SATURATION: 92 % | TEMPERATURE: 98 F

## 2022-05-25 DIAGNOSIS — Z01.812 ENCOUNTER FOR PREOPERATIVE SCREENING LABORATORY TESTING FOR COVID-19 VIRUS: Primary | ICD-10-CM

## 2022-05-25 DIAGNOSIS — Z20.822 ENCOUNTER FOR PREOPERATIVE SCREENING LABORATORY TESTING FOR COVID-19 VIRUS: Primary | ICD-10-CM

## 2022-05-25 DIAGNOSIS — C50.112 MALIGNANT NEOPLASM OF CENTRAL PORTION OF LEFT BREAST IN FEMALE, ESTROGEN RECEPTOR POSITIVE (HCC): ICD-10-CM

## 2022-05-25 DIAGNOSIS — Z17.0 MALIGNANT NEOPLASM OF CENTRAL PORTION OF LEFT BREAST IN FEMALE, ESTROGEN RECEPTOR POSITIVE (HCC): ICD-10-CM

## 2022-05-25 DIAGNOSIS — Z90.13 H/O BILATERAL MASTECTOMY: ICD-10-CM

## 2022-05-25 DIAGNOSIS — Z90.13 ABSENCE OF BOTH BREASTS: ICD-10-CM

## 2022-05-25 PROCEDURE — 0HRV0JZ REPLACEMENT OF BILATERAL BREAST WITH SYNTHETIC SUBSTITUTE, OPEN APPROACH: ICD-10-PCS | Performed by: SURGERY

## 2022-05-25 PROCEDURE — 0JD83ZZ EXTRACTION OF ABDOMEN SUBCUTANEOUS TISSUE AND FASCIA, PERCUTANEOUS APPROACH: ICD-10-PCS | Performed by: SURGERY

## 2022-05-25 PROCEDURE — 0HPT0NZ REMOVAL OF TISSUE EXPANDER FROM RIGHT BREAST, OPEN APPROACH: ICD-10-PCS | Performed by: SURGERY

## 2022-05-25 PROCEDURE — 88305 TISSUE EXAM BY PATHOLOGIST: CPT | Performed by: SURGERY

## 2022-05-25 PROCEDURE — 0HPU0NZ REMOVAL OF TISSUE EXPANDER FROM LEFT BREAST, OPEN APPROACH: ICD-10-PCS | Performed by: SURGERY

## 2022-05-25 DEVICE — IMPLANTABLE DEVICE: Type: IMPLANTABLE DEVICE | Site: BREAST | Status: FUNCTIONAL

## 2022-05-25 RX ORDER — SODIUM CHLORIDE, SODIUM LACTATE, POTASSIUM CHLORIDE, CALCIUM CHLORIDE 600; 310; 30; 20 MG/100ML; MG/100ML; MG/100ML; MG/100ML
INJECTION, SOLUTION INTRAVENOUS CONTINUOUS
Status: DISCONTINUED | OUTPATIENT
Start: 2022-05-25 | End: 2022-05-25

## 2022-05-25 RX ORDER — ONDANSETRON 2 MG/ML
INJECTION INTRAMUSCULAR; INTRAVENOUS AS NEEDED
Status: DISCONTINUED | OUTPATIENT
Start: 2022-05-25 | End: 2022-05-25 | Stop reason: SURG

## 2022-05-25 RX ORDER — DEXAMETHASONE SODIUM PHOSPHATE 4 MG/ML
VIAL (ML) INJECTION AS NEEDED
Status: DISCONTINUED | OUTPATIENT
Start: 2022-05-25 | End: 2022-05-25 | Stop reason: SURG

## 2022-05-25 RX ORDER — LIDOCAINE HYDROCHLORIDE 10 MG/ML
INJECTION, SOLUTION EPIDURAL; INFILTRATION; INTRACAUDAL; PERINEURAL AS NEEDED
Status: DISCONTINUED | OUTPATIENT
Start: 2022-05-25 | End: 2022-05-25 | Stop reason: SURG

## 2022-05-25 RX ORDER — ONDANSETRON 2 MG/ML
4 INJECTION INTRAMUSCULAR; INTRAVENOUS EVERY 6 HOURS PRN
Status: DISCONTINUED | OUTPATIENT
Start: 2022-05-25 | End: 2022-05-25

## 2022-05-25 RX ORDER — NALOXONE HYDROCHLORIDE 0.4 MG/ML
80 INJECTION, SOLUTION INTRAMUSCULAR; INTRAVENOUS; SUBCUTANEOUS AS NEEDED
Status: DISCONTINUED | OUTPATIENT
Start: 2022-05-25 | End: 2022-05-25

## 2022-05-25 RX ORDER — CEPHALEXIN 500 MG/1
500 CAPSULE ORAL 4 TIMES DAILY
Qty: 20 CAPSULE | Refills: 0 | Status: SHIPPED | OUTPATIENT
Start: 2022-05-25 | End: 2022-05-30

## 2022-05-25 RX ORDER — METOCLOPRAMIDE HYDROCHLORIDE 5 MG/ML
INJECTION INTRAMUSCULAR; INTRAVENOUS AS NEEDED
Status: DISCONTINUED | OUTPATIENT
Start: 2022-05-25 | End: 2022-05-25 | Stop reason: SURG

## 2022-05-25 RX ORDER — ACETAMINOPHEN 500 MG
1000 TABLET ORAL ONCE
Status: DISCONTINUED | OUTPATIENT
Start: 2022-05-25 | End: 2022-05-25 | Stop reason: HOSPADM

## 2022-05-25 RX ORDER — ROCURONIUM BROMIDE 10 MG/ML
INJECTION, SOLUTION INTRAVENOUS AS NEEDED
Status: DISCONTINUED | OUTPATIENT
Start: 2022-05-25 | End: 2022-05-25 | Stop reason: SURG

## 2022-05-25 RX ORDER — LIDOCAINE HYDROCHLORIDE AND EPINEPHRINE 10; 10 MG/ML; UG/ML
INJECTION, SOLUTION INFILTRATION; PERINEURAL AS NEEDED
Status: DISCONTINUED | OUTPATIENT
Start: 2022-05-25 | End: 2022-05-25 | Stop reason: HOSPADM

## 2022-05-25 RX ORDER — HYDROMORPHONE HYDROCHLORIDE 1 MG/ML
0.2 INJECTION, SOLUTION INTRAMUSCULAR; INTRAVENOUS; SUBCUTANEOUS EVERY 5 MIN PRN
Status: DISCONTINUED | OUTPATIENT
Start: 2022-05-25 | End: 2022-05-25

## 2022-05-25 RX ORDER — HYDROMORPHONE HYDROCHLORIDE 1 MG/ML
0.6 INJECTION, SOLUTION INTRAMUSCULAR; INTRAVENOUS; SUBCUTANEOUS EVERY 5 MIN PRN
Status: DISCONTINUED | OUTPATIENT
Start: 2022-05-25 | End: 2022-05-25

## 2022-05-25 RX ORDER — CEFAZOLIN SODIUM/WATER 2 G/20 ML
2 SYRINGE (ML) INTRAVENOUS ONCE
Status: COMPLETED | OUTPATIENT
Start: 2022-05-25 | End: 2022-05-25

## 2022-05-25 RX ORDER — KETAMINE HYDROCHLORIDE 50 MG/ML
INJECTION, SOLUTION, CONCENTRATE INTRAMUSCULAR; INTRAVENOUS AS NEEDED
Status: DISCONTINUED | OUTPATIENT
Start: 2022-05-25 | End: 2022-05-25 | Stop reason: SURG

## 2022-05-25 RX ORDER — MIDAZOLAM HYDROCHLORIDE 1 MG/ML
INJECTION INTRAMUSCULAR; INTRAVENOUS AS NEEDED
Status: DISCONTINUED | OUTPATIENT
Start: 2022-05-25 | End: 2022-05-25 | Stop reason: SURG

## 2022-05-25 RX ORDER — ACETAMINOPHEN 500 MG
1000 TABLET ORAL ONCE AS NEEDED
Status: COMPLETED | OUTPATIENT
Start: 2022-05-25 | End: 2022-05-25

## 2022-05-25 RX ORDER — HYDROCODONE BITARTRATE AND ACETAMINOPHEN 5; 325 MG/1; MG/1
1 TABLET ORAL ONCE AS NEEDED
Status: COMPLETED | OUTPATIENT
Start: 2022-05-25 | End: 2022-05-25

## 2022-05-25 RX ORDER — EPHEDRINE SULFATE 50 MG/ML
INJECTION INTRAVENOUS AS NEEDED
Status: DISCONTINUED | OUTPATIENT
Start: 2022-05-25 | End: 2022-05-25 | Stop reason: SURG

## 2022-05-25 RX ORDER — HYDROCODONE BITARTRATE AND ACETAMINOPHEN 5; 325 MG/1; MG/1
1-2 TABLET ORAL EVERY 4 HOURS PRN
Qty: 40 TABLET | Refills: 0 | Status: SHIPPED | OUTPATIENT
Start: 2022-05-25

## 2022-05-25 RX ORDER — DOCUSATE SODIUM 100 MG/1
100 CAPSULE, LIQUID FILLED ORAL 2 TIMES DAILY
Qty: 40 CAPSULE | Refills: 0 | Status: SHIPPED | OUTPATIENT
Start: 2022-05-25

## 2022-05-25 RX ORDER — MIDAZOLAM HYDROCHLORIDE 1 MG/ML
1 INJECTION INTRAMUSCULAR; INTRAVENOUS EVERY 5 MIN PRN
Status: DISCONTINUED | OUTPATIENT
Start: 2022-05-25 | End: 2022-05-25

## 2022-05-25 RX ORDER — HYDROCODONE BITARTRATE AND ACETAMINOPHEN 5; 325 MG/1; MG/1
2 TABLET ORAL ONCE AS NEEDED
Status: COMPLETED | OUTPATIENT
Start: 2022-05-25 | End: 2022-05-25

## 2022-05-25 RX ORDER — HYDROMORPHONE HYDROCHLORIDE 1 MG/ML
0.4 INJECTION, SOLUTION INTRAMUSCULAR; INTRAVENOUS; SUBCUTANEOUS EVERY 5 MIN PRN
Status: DISCONTINUED | OUTPATIENT
Start: 2022-05-25 | End: 2022-05-25

## 2022-05-25 RX ORDER — MEPERIDINE HYDROCHLORIDE 25 MG/ML
12.5 INJECTION INTRAMUSCULAR; INTRAVENOUS; SUBCUTANEOUS AS NEEDED
Status: DISCONTINUED | OUTPATIENT
Start: 2022-05-25 | End: 2022-05-25

## 2022-05-25 RX ORDER — METOCLOPRAMIDE HYDROCHLORIDE 5 MG/ML
10 INJECTION INTRAMUSCULAR; INTRAVENOUS EVERY 8 HOURS PRN
Status: DISCONTINUED | OUTPATIENT
Start: 2022-05-25 | End: 2022-05-25

## 2022-05-25 RX ORDER — ONDANSETRON 4 MG/1
4 TABLET, FILM COATED ORAL EVERY 8 HOURS PRN
Qty: 30 TABLET | Refills: 0 | Status: SHIPPED | OUTPATIENT
Start: 2022-05-25

## 2022-05-25 RX ADMIN — CEFAZOLIN SODIUM/WATER 2 G: 2 G/20 ML SYRINGE (ML) INTRAVENOUS at 13:15:00

## 2022-05-25 RX ADMIN — ROCURONIUM BROMIDE 30 MG: 10 INJECTION, SOLUTION INTRAVENOUS at 13:49:00

## 2022-05-25 RX ADMIN — KETAMINE HYDROCHLORIDE 25 MG: 50 INJECTION, SOLUTION, CONCENTRATE INTRAMUSCULAR; INTRAVENOUS at 13:10:00

## 2022-05-25 RX ADMIN — SODIUM CHLORIDE, SODIUM LACTATE, POTASSIUM CHLORIDE, CALCIUM CHLORIDE: 600; 310; 30; 20 INJECTION, SOLUTION INTRAVENOUS at 13:10:00

## 2022-05-25 RX ADMIN — ROCURONIUM BROMIDE 10 MG: 10 INJECTION, SOLUTION INTRAVENOUS at 13:15:00

## 2022-05-25 RX ADMIN — SODIUM CHLORIDE, SODIUM LACTATE, POTASSIUM CHLORIDE, CALCIUM CHLORIDE: 600; 310; 30; 20 INJECTION, SOLUTION INTRAVENOUS at 14:28:00

## 2022-05-25 RX ADMIN — SODIUM CHLORIDE, SODIUM LACTATE, POTASSIUM CHLORIDE, CALCIUM CHLORIDE: 600; 310; 30; 20 INJECTION, SOLUTION INTRAVENOUS at 12:04:00

## 2022-05-25 RX ADMIN — ONDANSETRON 4 MG: 2 INJECTION INTRAMUSCULAR; INTRAVENOUS at 13:10:00

## 2022-05-25 RX ADMIN — EPHEDRINE SULFATE 10 MG: 50 INJECTION INTRAVENOUS at 13:27:00

## 2022-05-25 RX ADMIN — DEXAMETHASONE SODIUM PHOSPHATE 8 MG: 4 MG/ML VIAL (ML) INJECTION at 13:10:00

## 2022-05-25 RX ADMIN — MIDAZOLAM HYDROCHLORIDE 2 MG: 1 INJECTION INTRAMUSCULAR; INTRAVENOUS at 13:07:00

## 2022-05-25 RX ADMIN — ROCURONIUM BROMIDE 40 MG: 10 INJECTION, SOLUTION INTRAVENOUS at 13:10:00

## 2022-05-25 RX ADMIN — METOCLOPRAMIDE HYDROCHLORIDE 10 MG: 5 INJECTION INTRAMUSCULAR; INTRAVENOUS at 13:10:00

## 2022-05-25 RX ADMIN — EPHEDRINE SULFATE 10 MG: 50 INJECTION INTRAVENOUS at 13:23:00

## 2022-05-25 RX ADMIN — SODIUM CHLORIDE, SODIUM LACTATE, POTASSIUM CHLORIDE, CALCIUM CHLORIDE: 600; 310; 30; 20 INJECTION, SOLUTION INTRAVENOUS at 15:38:00

## 2022-05-25 RX ADMIN — SODIUM CHLORIDE, SODIUM LACTATE, POTASSIUM CHLORIDE, CALCIUM CHLORIDE: 600; 310; 30; 20 INJECTION, SOLUTION INTRAVENOUS at 14:53:00

## 2022-05-25 RX ADMIN — KETAMINE HYDROCHLORIDE 25 MG: 50 INJECTION, SOLUTION, CONCENTRATE INTRAMUSCULAR; INTRAVENOUS at 13:27:00

## 2022-05-25 RX ADMIN — LIDOCAINE HYDROCHLORIDE 50 MG: 10 INJECTION, SOLUTION EPIDURAL; INFILTRATION; INTRACAUDAL; PERINEURAL at 13:10:00

## 2022-05-25 NOTE — ANESTHESIA POSTPROCEDURE EVALUATION
[address] Patient Status:  Hospital Outpatient Surgery   Age/Gender 80year old female MRN [id number]   [facility] SURGERY Attending Dung Lackey MD   Hosp Day # 0 PCP Corina Mchugh MD       Anesthesia Post-op Note    Removal of bilateral breast tissue expanders and Placement of permanent implants with fat grafting to bilateral breasts. Procedure Summary     Date: 05/25/22 Room / Location: [address] [place] [address] OR 19 / [address] [facility] OR    Anesthesia Start: 9714 Anesthesia Stop:     Procedures:       Removal of bilateral breast tissue expanders and Placement of permanent implants with fat grafting to bilateral breasts. (Bilateral Breast)      FAT GRAFTING (N/A Abdomen) Diagnosis:       H/O bilateral mastectomy      Absence of both breasts      Malignant neoplasm of central portion of left breast in female, estrogen receptor positive ([address]      (H/O bilateral mastectomy [Z90.13]Absence of both breasts [Z90.13]Malignant neoplasm of central portion of left breast in female, estrogen receptor positive ([address] [C50.112, Z17.0])    Surgeons: Dung Lackey MD Anesthesiologist: Jonah Patino MD    Anesthesia Type: general ASA Status: 2          Anesthesia Type: general    Vitals Value Taken Time   /50 05/25/22 1607   Temp 98 05/25/22 1607   Pulse 110 05/25/22 1607   Resp 16 05/25/22 1607   SpO2 93 05/25/22 1607       Patient Location: PACU    Anesthesia Type: general    Airway Patency: patent    Postop Pain Control: adequate    Mental Status: preanesthetic baseline    Nausea/Vomiting: none    Cardiopulmonary/Hydration status: stable euvolemic    Complications: no apparent anesthesia related complications    Postop vital signs: stable    Dental Exam: Unchanged from Preop    Patient to be discharged from PACU when criteria met.

## 2022-05-25 NOTE — H&P
Dr. York Ill surgical clearance H&P from 4/29/22 reviewed. No interval changes. Informed consent obtained and she wishes to proceed as planned.

## 2022-05-25 NOTE — ANESTHESIA PROCEDURE NOTES
Airway  Date/Time: 5/25/2022 1:11 PM  Urgency: Elective    Airway not difficult    General Information and Staff    Patient location during procedure: OR  Anesthesiologist: Candelaria Max MD  Resident/CRNA: Lucille Hong CRNA  Performed: CRNA     Indications and Patient Condition  Indications for airway management: anesthesia  Sedation level: deep  Preoxygenated: yes  Patient position: sniffing  Mask difficulty assessment: 1 - vent by mask    Final Airway Details  Final airway type: endotracheal airway      Successful airway: ETT  Cuffed: yes   Successful intubation technique: direct laryngoscopy  Facilitating devices/methods: cricoid pressure and intubating stylet  Endotracheal tube insertion site: oral  Blade: Margaux  Blade size: #4  ETT size (mm): 7.0    Cormack-Lehane Classification: grade I - full view of glottis  Placement verified by: chest auscultation and capnometry   Cuff volume (mL): 7  Measured from: teeth  ETT to teeth (cm): 22  Number of attempts at approach: 1    Additional Comments  Teeth lips and tongue pre induction condition

## 2022-05-25 NOTE — BRIEF OP NOTE
Pre-Operative Diagnosis: H/O bilateral mastectomy [Z90.13]  Absence of both breasts [Z90.13]  Malignant neoplasm of central portion of left breast in female, estrogen receptor positive ([address] [C50.112, Z17.0]     Post-Operative Diagnosis: H/O bilateral mastectomy [Z90.13]Absence of both breasts [Z90.13]Malignant neoplasm of central portion of left breast in female, estrogen receptor positive ([address] [C50.112, Z17.0]      Procedure Performed:   Removal of bilateral breast tissue expanders and Placement of permanent implants with fat grafting to bilateral breasts.    Fattransfer:  Right 25cc  Left 65cc    Surgeon(s) and Role:     * Lloyd Santos MD - Primary    Assistant(s):  PA: CALE Martinez     Surgical Findings: as dictated     Specimen: none     Estimated Blood Loss: Blood Output: 15 mL (5/25/2022  3:53 PM)      Santos Herrera PA-C  5/25/2022  4:02 PM

## 2022-05-26 NOTE — OPERATIVE REPORT
[facility]    PATIENT'S NAME: Sabi [facility]   ATTENDING PHYSICIAN: Fito Chavez M.D. OPERATING PHYSICIAN: Fito Chavez M.D. PATIENT ACCOUNT#:   [de-identified]    LOCATION:  [facility] 7 EDWP 10  MEDICAL RECORD #:   [id number]       YOB: 1950  ADMISSION DATE:       05/25/2022      OPERATION DATE:  05/25/2022    OPERATIVE REPORT    PREOPERATIVE DIAGNOSIS:  History of bilateral mastectomy and tissue expander reconstruction. POSTOPERATIVE DIAGNOSIS:  History of bilateral mastectomy and tissue expander reconstruction. PROCEDURE:  1. Removal of bilateral tissue expanders. 2.   Bilateral breast capsulotomy. 3.   Placement of silicone gel implants, bilateral breasts. 4.   Autologous fat grafting of bilateral reconstructed breasts. ASSISTANT:  Brian Wilkerson PA-C. ANESTHESIA:  General.    ESTIMATED BLOOD LOSS:  20 mL. COMPLICATIONS:  None. INDICATIONS:  Patient is a 73-year-old female who presented with bilateral nipple-sparing mastectomy and implant and tissue expander reconstruction. She completed uncomplicated expansion and now presents for exchange of permanent implants and fat grafting. FINDINGS:  Bilateral breasts reconstructed with Relda Omer SoftTouch breast implant, style SSF, 560 mL, reference SSF-560, on the right serial number [id number], on the left serial number [id number]. OPERATIVE TECHNIQUE:  Informed consent was obtained from the patient. The risks, benefits, and alternatives were reviewed with the patient preoperatively. She expressed understanding and wished to proceed. Patient was marked in the preoperative holding area in the upright position. The midline and inframammary folds were marked. Areas to be fat grafted were marked. Areas of skin excess in the axillary region were marked for excision. Finally, areas of central abdominal and flank lipodystrophy were marked for liposuction.   The patient was then taken to the operating room, properly identified, placed in the supine position. Sequential compression devices were placed on bilateral lower extremities. Intravenous antibiotic prophylaxis was administered. The patient then underwent successful induction of general anesthesia and endotracheal intubation. The arms were placed abducted on foam-padded arm boards and loosely secured with Kerlix. The chest and abdomen were then prepped and draped sterilely. The proposed liposuction port sites at the lateral aspects of the patient's abdominoplasty scar were infiltrated with 1% lidocaine with epinephrine. Stab incisions were then created and 1 L of tumescent solution was infiltrated into the central abdomen and flanks. Next, attention was turned to the breast.  Both inframammary fold incisions were infiltrated with 1% lidocaine with epinephrine. The procedure began on the right breast.  The inframammary fold incisions were then created with a scalpel and deepened to the underlying capsule with electrocautery. A superior subcutaneous flap was then elevated sharply with the scalpel. A transverse capsulotomy was then created and the tissue expander was encountered and found to be intact. It was punctured and removed intact. The pocket was then inspected. There was no periprosthetic fluid noted. There were no visible or palpable nodules noted. The capsule was then released along the superior 180 degrees with electrocautery. Medially, the capsule was released additionally to approximately the 4-o'clock position. The submuscular pocket was then developed with electrocautery. Sizers were then placed and attention was turned to the left breast.  In a similar fashion, the left breast incision was recreated with a scalpel. The superior subcutaneous flaps were elevated sharply with a scalpel. A transverse capsulotomy was then created and the tissue expander was then encountered and found to be intact.   It was punctured, aspirated, and removed. The pocket was inspected. There was no periprosthetic fluid noted. There were no visible or palpable nodules noted. Again, the capsule was released superiorly and medially with electrocautery. The sizers with different volumes and projections were placed. Ultimately, it appeared that the style SSF-560 mL implant gave the best size and shape in accordance with the patient's desires. Attention was then turned to the abdomen. Using a 3 mm Mercedes tip cannula, power-assisted liposuction of the central abdomen and flanks was performed. The lipoaspirate was collected using the [employer] system. Approximately 500 mL of lipoaspirate were collected. The fat was then decanted and the fat was then injected via the existing incisions on the right breast and via a stab incision at the superior areolar border of the left breast.  A total of 65 mL was grafted to the left breast and 25 mL was grafted to the right breast.  The liposuction port sites were closed with interrupted 3-0 Vicryl deep dermal sutures. The left breast fat grafting port was closed with interrupted 5-0 chromic sutures. Next, both pockets were irrigated with Betadine irrigation and then antibiotic irrigation until clear. Hemostasis was checked and noted to be adequate. The surgical sites were isolated with Ioban and gloves were changed. The implants were then brought on the field and immediately bathed in antibiotic irrigation. They were checked for integrity and noted to be intact. The implants were placed in the submuscular pocket taking care to maintain the proper orientation. The capsule was then closed with running 2-0 Vicryl sutures bilaterally. Next, the skin excision of the axillary region was tailor tacked and marked. A transverse ellipse was then excised bilaterally with a scalpel and electrocautery. The tissue was sent for permanent pathologic analysis as right and left breast tissue.   All the wounds were then repaired in layered fashion with interrupted 2-0 Vicryl deep dermal suture and running 4-0 Monocryl subcuticular suture. Exofin and Steri-Strips were placed on the incisions. Fluff gauze and a surgical bra were placed on the breast.  TopiFoam and abdominal binder were placed on the abdomen. The patient was awakened, extubated, taken to the recovery area in stable condition. There were no operative complications. All needle, sponge, and instrument counts were correct at the end of the procedure. Dictated By Shelli Griffiths M.D.  d: 05/25/2022 16:01:58  t: 05/25/2022 18:44:44  Job [id number]  LAP/

## 2022-06-03 ENCOUNTER — OFFICE VISIT (OUTPATIENT)
Dept: SURGERY | Facility: CLINIC | Age: 72
End: 2022-06-03
Payer: COMMERCIAL

## 2022-06-03 DIAGNOSIS — Z90.13 ABSENCE OF BOTH BREASTS: Primary | ICD-10-CM

## 2022-06-03 PROCEDURE — 99024 POSTOP FOLLOW-UP VISIT: CPT | Performed by: PHYSICIAN ASSISTANT

## 2022-06-03 NOTE — PROGRESS NOTES
Emma Castro is a 80year old female who presents today for a follow-up after removal of bilateral breast tissue expanders, bilateral capsulotomy, placement of silicone gel implants bilateral breasts (Jose Felix SoftTouch style  mL), autologous fat grafting to bilateral reconstructed breasts with Dr. Diane Castillo on 5/25/2022. She denies fever and chills. She denies nausea, vomiting, diarrhea or constipation. Her pain is controlled. She is not taking narcotic pain medication. She completed her antibiotic. She has been compliant with compression and activity restrictions. Physical Exam     Breasts: bilateral breast incisions clean, dry and intact without wound drainage or wound dehiscence. Healing blister approximately 0.5 cm x 1 cm. Bilateral breast skin without erythema or necrosis. There is mild resolving ecchymosis bilaterally. No evidence of hematoma/seroma. Good shape and symmetry. Bilateral nipples viable. Abdomen: bilateral fat grafting incisions clean, dry and intact without wound drainage or erythema. No evidence of hematoma/seroma. There were no vitals filed for this visit. Assessment and Plan     Emma Castro is doing well s/p removal of bilateral breast tissue expanders, bilateral capsulotomy, placement of silicone gel implants bilateral breasts (Jose Felix SoftTouch style  mL), autologous fat grafting to bilateral reconstructed breasts with Dr. Diane Castillo on 5/25/2022. New steri-strips were placed on the incisions. She will continue to apply neosporin to her blister. She will continue with breast compression and activity restrictions. She can transition to her own abdominal compression garments. We reviewed reasons to contact our office. She will follow up with Dr. Diane Castillo in 2 weeks. Questions were answered. Patient understands.      Carol Bower  6/3/2022  10:34 AM

## 2022-06-17 ENCOUNTER — OFFICE VISIT (OUTPATIENT)
Dept: SURGERY | Facility: CLINIC | Age: 72
End: 2022-06-17
Payer: COMMERCIAL

## 2022-06-17 DIAGNOSIS — Z90.13 ABSENCE OF BOTH BREASTS: Primary | ICD-10-CM

## 2022-06-17 PROCEDURE — 99024 POSTOP FOLLOW-UP VISIT: CPT | Performed by: SURGERY

## 2022-06-17 NOTE — PROGRESS NOTES
Gee Quiroz is a 80year old female who presents today for a follow-up. She denies fever and chills. She denies nausea, vomiting, diarrhea or constipation. Physical Examination:  Breasts: Bilateral breast and abdominal incisions clean dry and intact. There is no erythema or seroma noted. Assessment and Plan:  Patient doing well. She may slowly increase activity with compression in place. We discussed scar care including massage moisturizer and silicone products. The patient will return in approximate 3 months for scar check. The plan was reviewed with the patient and questions were answered.

## 2022-06-21 DIAGNOSIS — C50.919 INFILTRATING DUCTAL CARCINOMA (HCC): ICD-10-CM

## 2022-06-21 RX ORDER — LETROZOLE 2.5 MG/1
2.5 TABLET, FILM COATED ORAL DAILY
Qty: 90 TABLET | Refills: 3 | Status: SHIPPED | OUTPATIENT
Start: 2022-06-21 | End: 2022-09-13

## 2022-06-22 RX ORDER — VENLAFAXINE HYDROCHLORIDE 37.5 MG/1
37.5 CAPSULE, EXTENDED RELEASE ORAL DAILY
Qty: 30 CAPSULE | Refills: 6 | Status: SHIPPED | OUTPATIENT
Start: 2022-06-22 | End: 2022-09-13

## 2022-09-13 ENCOUNTER — OFFICE VISIT (OUTPATIENT)
Dept: FAMILY MEDICINE CLINIC | Facility: CLINIC | Age: 72
End: 2022-09-13

## 2022-09-13 VITALS
OXYGEN SATURATION: 96 % | BODY MASS INDEX: 25.66 KG/M2 | SYSTOLIC BLOOD PRESSURE: 96 MMHG | DIASTOLIC BLOOD PRESSURE: 64 MMHG | HEART RATE: 79 BPM | WEIGHT: 154 LBS | HEIGHT: 65 IN

## 2022-09-13 DIAGNOSIS — K21.9 GASTROESOPHAGEAL REFLUX DISEASE WITHOUT ESOPHAGITIS: ICD-10-CM

## 2022-09-13 DIAGNOSIS — L98.9 SKIN LESION: ICD-10-CM

## 2022-09-13 DIAGNOSIS — F41.1 GENERALIZED ANXIETY DISORDER: ICD-10-CM

## 2022-09-13 DIAGNOSIS — Z90.13 H/O BILATERAL MASTECTOMY: ICD-10-CM

## 2022-09-13 DIAGNOSIS — E78.5 DYSLIPIDEMIA: ICD-10-CM

## 2022-09-13 DIAGNOSIS — R10.32 LEFT LOWER QUADRANT ABDOMINAL PAIN: ICD-10-CM

## 2022-09-13 DIAGNOSIS — E83.52 HYPERCALCEMIA: ICD-10-CM

## 2022-09-13 DIAGNOSIS — Z79.899 LONG-TERM USE OF HIGH-RISK MEDICATION: ICD-10-CM

## 2022-09-13 DIAGNOSIS — R03.0 BORDERLINE HYPERTENSION: ICD-10-CM

## 2022-09-13 DIAGNOSIS — E55.9 VITAMIN D DEFICIENCY: ICD-10-CM

## 2022-09-13 DIAGNOSIS — Z00.00 ROUTINE MEDICAL EXAM: Primary | ICD-10-CM

## 2022-09-13 DIAGNOSIS — G47.09 OTHER INSOMNIA: ICD-10-CM

## 2022-09-13 DIAGNOSIS — Z01.818 PRE-OPERATIVE GENERAL PHYSICAL EXAMINATION: ICD-10-CM

## 2022-09-13 DIAGNOSIS — T88.7XXA SIDE EFFECT OF MEDICATION: ICD-10-CM

## 2022-09-13 DIAGNOSIS — M85.852 OSTEOPENIA OF NECK OF LEFT FEMUR: ICD-10-CM

## 2022-09-13 DIAGNOSIS — Z85.3 HISTORY OF BREAST CANCER: ICD-10-CM

## 2022-09-13 PROCEDURE — 3078F DIAST BP <80 MM HG: CPT | Performed by: FAMILY MEDICINE

## 2022-09-13 PROCEDURE — 99214 OFFICE O/P EST MOD 30 MIN: CPT | Performed by: FAMILY MEDICINE

## 2022-09-13 PROCEDURE — 3074F SYST BP LT 130 MM HG: CPT | Performed by: FAMILY MEDICINE

## 2022-09-13 PROCEDURE — 3008F BODY MASS INDEX DOCD: CPT | Performed by: FAMILY MEDICINE

## 2022-09-13 PROCEDURE — G0439 PPPS, SUBSEQ VISIT: HCPCS | Performed by: FAMILY MEDICINE

## 2022-09-13 RX ORDER — VENLAFAXINE HYDROCHLORIDE 37.5 MG/1
37.5 CAPSULE, EXTENDED RELEASE ORAL DAILY
Qty: 90 CAPSULE | Refills: 1 | Status: SHIPPED | OUTPATIENT
Start: 2022-09-13 | End: 2023-04-11

## 2022-09-13 RX ORDER — ESZOPICLONE 2 MG/1
2 TABLET, FILM COATED ORAL NIGHTLY PRN
Qty: 90 TABLET | Refills: 1 | Status: SHIPPED
Start: 2022-09-13 | End: 2022-09-13

## 2022-09-13 RX ORDER — PANTOPRAZOLE SODIUM 40 MG/1
40 TABLET, DELAYED RELEASE ORAL DAILY
Qty: 90 TABLET | Refills: 1 | Status: SHIPPED | OUTPATIENT
Start: 2022-09-13

## 2022-09-13 RX ORDER — ESZOPICLONE 2 MG/1
2 TABLET, FILM COATED ORAL NIGHTLY PRN
Qty: 90 TABLET | Refills: 1 | Status: SHIPPED | OUTPATIENT
Start: 2022-09-13

## 2022-09-16 ENCOUNTER — OFFICE VISIT (OUTPATIENT)
Dept: SURGERY | Facility: CLINIC | Age: 72
End: 2022-09-16
Payer: COMMERCIAL

## 2022-09-16 DIAGNOSIS — Z90.13 ABSENCE OF BOTH BREASTS: Primary | ICD-10-CM

## 2022-09-16 PROCEDURE — 99212 OFFICE O/P EST SF 10 MIN: CPT | Performed by: SURGERY

## 2022-09-16 NOTE — PROGRESS NOTES
Prabhakar Luther is a 80year old female who presents today for a follow-up. She complains of skin excess in her axillary regions. Physical Examination:  Breasts: Bilateral breast incisions are well-healed. The left breast is noted have a paucity of subcutaneous fat in the upper pole relative to the right. Breasts are soft without palpable masses noted. Assessment and Plan:  We discussed the option of additional fat grafting to the left reconstructed breast.  The nature of procedure was reviewed with the patient. The risk, benefits, and alternatives were reviewed. The patient is considering her options and will return for preoperative visit if she is interested in proceeding. If not, we discussed performing regular self breast examination with a plan for follow-up in 1 year or sooner if any changes are detected. The plan was reviewed with the patient and questions were answered.

## 2022-09-23 ENCOUNTER — OFFICE VISIT (OUTPATIENT)
Dept: HEMATOLOGY/ONCOLOGY | Facility: HOSPITAL | Age: 72
End: 2022-09-23
Attending: INTERNAL MEDICINE
Payer: COMMERCIAL

## 2022-09-23 VITALS
BODY MASS INDEX: 25.66 KG/M2 | TEMPERATURE: 98 F | SYSTOLIC BLOOD PRESSURE: 131 MMHG | OXYGEN SATURATION: 100 % | DIASTOLIC BLOOD PRESSURE: 77 MMHG | RESPIRATION RATE: 16 BRPM | WEIGHT: 154 LBS | HEART RATE: 71 BPM | HEIGHT: 65 IN

## 2022-09-23 DIAGNOSIS — Z17.0 MALIGNANT NEOPLASM OF CENTRAL PORTION OF LEFT BREAST IN FEMALE, ESTROGEN RECEPTOR POSITIVE (HCC): Primary | ICD-10-CM

## 2022-09-23 DIAGNOSIS — Z90.13 H/O BILATERAL MASTECTOMY: ICD-10-CM

## 2022-09-23 DIAGNOSIS — C50.112 MALIGNANT NEOPLASM OF CENTRAL PORTION OF LEFT BREAST IN FEMALE, ESTROGEN RECEPTOR POSITIVE (HCC): Primary | ICD-10-CM

## 2022-09-23 DIAGNOSIS — Z79.811 ENCOUNTER FOR MONITORING AROMATASE INHIBITOR THERAPY: ICD-10-CM

## 2022-09-23 DIAGNOSIS — Z51.81 ENCOUNTER FOR MONITORING AROMATASE INHIBITOR THERAPY: ICD-10-CM

## 2022-09-23 PROCEDURE — 99214 OFFICE O/P EST MOD 30 MIN: CPT | Performed by: INTERNAL MEDICINE

## 2022-09-23 RX ORDER — EXEMESTANE 25 MG/1
25 TABLET ORAL DAILY
Qty: 30 TABLET | Refills: 6 | Status: SHIPPED | OUTPATIENT
Start: 2022-09-23

## 2022-09-26 ENCOUNTER — PATIENT MESSAGE (OUTPATIENT)
Dept: FAMILY MEDICINE CLINIC | Facility: CLINIC | Age: 72
End: 2022-09-26

## 2022-09-26 DIAGNOSIS — Z01.818 PRE-OP EXAMINATION: Primary | ICD-10-CM

## 2022-09-26 NOTE — TELEPHONE ENCOUNTER
From: Anibal Vieyra  To: Karlee Lopez MD  Sent: 9/26/2022 1:34 PM CDT  Subject: Ekg    Hi Dr Higuera Friday,    Regarding test needed for my neck lift Dr Dick Scanlon also requires an EKG, PT,PTT, U/A and a brief note saying I am optimized medically for anesthesia and surgery.   Thank you so much,  Constance Cruz

## 2022-09-26 NOTE — TELEPHONE ENCOUNTER
Orders placed, I can use her visit from 9/13/22 but will need to change my note, will need Dr. Declan Sanford first name and date of surgery and location, and fax; will clear once results final

## 2022-10-03 ENCOUNTER — PATIENT MESSAGE (OUTPATIENT)
Dept: FAMILY MEDICINE CLINIC | Facility: CLINIC | Age: 72
End: 2022-10-03

## 2022-10-03 DIAGNOSIS — Z01.818 PRE-OP EXAMINATION: ICD-10-CM

## 2022-10-03 DIAGNOSIS — R05.8 OTHER COUGH: Primary | ICD-10-CM

## 2022-10-03 NOTE — TELEPHONE ENCOUNTER
Please let her know that I documented all that info on my last note for future reference and unfortunately the reason she can't schedule w me in February is that I will no longer be practicing medicine.

## 2022-10-05 NOTE — TELEPHONE ENCOUNTER
From: Matilde Fernández  Sent: 10/4/2022 9:04 PM CDT  To: [facility] Clinical Staff  Subject: Medial Clearance    Thank you Corinna Deleon. I received a letter from DR. Paez office also asking for a chest X-ray . Could you please have that test ordered too. Thank you. I am sorry to hear that Dr Ayaz Swenson is leaving. I have grown quite fond of her and will miss her. Who will be taking over her practice? Thank you again for all of your help.   Alia

## 2022-10-08 ENCOUNTER — PATIENT MESSAGE (OUTPATIENT)
Dept: HEMATOLOGY/ONCOLOGY | Facility: HOSPITAL | Age: 72
End: 2022-10-08

## 2022-10-08 RX ORDER — EXEMESTANE 25 MG/1
25 TABLET ORAL DAILY
Qty: 90 TABLET | Refills: 3 | Status: SHIPPED | OUTPATIENT
Start: 2022-10-08

## 2022-11-15 ENCOUNTER — APPOINTMENT (OUTPATIENT)
Dept: HEMATOLOGY/ONCOLOGY | Facility: HOSPITAL | Age: 72
End: 2022-11-15
Payer: COMMERCIAL

## 2022-12-08 ENCOUNTER — OFFICE VISIT (OUTPATIENT)
Dept: FAMILY MEDICINE CLINIC | Facility: CLINIC | Age: 72
End: 2022-12-08
Payer: COMMERCIAL

## 2022-12-08 ENCOUNTER — HOSPITAL ENCOUNTER (OUTPATIENT)
Dept: GENERAL RADIOLOGY | Age: 72
Discharge: HOME | End: 2022-12-08
Attending: NURSE PRACTITIONER
Payer: COMMERCIAL

## 2022-12-08 VITALS
HEART RATE: 84 BPM | BODY MASS INDEX: 25.66 KG/M2 | WEIGHT: 154 LBS | OXYGEN SATURATION: 99 % | DIASTOLIC BLOOD PRESSURE: 79 MMHG | SYSTOLIC BLOOD PRESSURE: 132 MMHG | HEIGHT: 65 IN

## 2022-12-08 DIAGNOSIS — S22.41XD CLOSED FRACTURE OF MULTIPLE RIBS OF RIGHT SIDE WITH ROUTINE HEALING, SUBSEQUENT ENCOUNTER: Primary | ICD-10-CM

## 2022-12-08 DIAGNOSIS — R07.81 RIB PAIN: ICD-10-CM

## 2022-12-08 DIAGNOSIS — Z23 ENCOUNTER FOR IMMUNIZATION: ICD-10-CM

## 2022-12-08 DIAGNOSIS — S22.41XD CLOSED FRACTURE OF MULTIPLE RIBS OF RIGHT SIDE WITH ROUTINE HEALING, SUBSEQUENT ENCOUNTER: ICD-10-CM

## 2022-12-08 DIAGNOSIS — I77.1 TORTUOUS AORTA (HCC): ICD-10-CM

## 2022-12-08 DIAGNOSIS — M54.41 ACUTE BILATERAL LOW BACK PAIN WITH RIGHT-SIDED SCIATICA: ICD-10-CM

## 2022-12-08 PROCEDURE — 3075F SYST BP GE 130 - 139MM HG: CPT | Performed by: NURSE PRACTITIONER

## 2022-12-08 PROCEDURE — 3078F DIAST BP <80 MM HG: CPT | Performed by: NURSE PRACTITIONER

## 2022-12-08 PROCEDURE — 99213 OFFICE O/P EST LOW 20 MIN: CPT | Performed by: NURSE PRACTITIONER

## 2022-12-08 PROCEDURE — 90472 IMMUNIZATION ADMIN EACH ADD: CPT | Performed by: NURSE PRACTITIONER

## 2022-12-08 PROCEDURE — 71100 X-RAY EXAM RIBS UNI 2 VIEWS: CPT | Performed by: NURSE PRACTITIONER

## 2022-12-08 PROCEDURE — 90662 IIV NO PRSV INCREASED AG IM: CPT | Performed by: NURSE PRACTITIONER

## 2022-12-08 PROCEDURE — 90750 HZV VACC RECOMBINANT IM: CPT | Performed by: NURSE PRACTITIONER

## 2022-12-08 PROCEDURE — 90471 IMMUNIZATION ADMIN: CPT | Performed by: NURSE PRACTITIONER

## 2022-12-08 PROCEDURE — 3008F BODY MASS INDEX DOCD: CPT | Performed by: NURSE PRACTITIONER

## 2022-12-08 RX ORDER — NAPROXEN 500 MG/1
500 TABLET ORAL 2 TIMES DAILY WITH MEALS
Qty: 28 TABLET | Refills: 0 | Status: SHIPPED | OUTPATIENT
Start: 2022-12-08 | End: 2022-12-22

## 2022-12-14 ENCOUNTER — OFFICE VISIT (OUTPATIENT)
Dept: SURGERY | Facility: CLINIC | Age: 72
End: 2022-12-14
Payer: COMMERCIAL

## 2022-12-14 VITALS
RESPIRATION RATE: 18 BRPM | HEART RATE: 92 BPM | BODY MASS INDEX: 25.13 KG/M2 | OXYGEN SATURATION: 99 % | DIASTOLIC BLOOD PRESSURE: 75 MMHG | SYSTOLIC BLOOD PRESSURE: 108 MMHG | HEIGHT: 65 IN | WEIGHT: 150.81 LBS | TEMPERATURE: 98 F

## 2022-12-14 DIAGNOSIS — Z17.0 MALIGNANT NEOPLASM OF CENTRAL PORTION OF LEFT BREAST IN FEMALE, ESTROGEN RECEPTOR POSITIVE (HCC): Primary | ICD-10-CM

## 2022-12-14 DIAGNOSIS — Z90.13 ABSENCE OF BOTH BREASTS: ICD-10-CM

## 2022-12-14 DIAGNOSIS — C50.112 MALIGNANT NEOPLASM OF CENTRAL PORTION OF LEFT BREAST IN FEMALE, ESTROGEN RECEPTOR POSITIVE (HCC): Primary | ICD-10-CM

## 2022-12-14 PROCEDURE — 99213 OFFICE O/P EST LOW 20 MIN: CPT | Performed by: SURGERY

## 2022-12-14 PROCEDURE — 3074F SYST BP LT 130 MM HG: CPT | Performed by: SURGERY

## 2022-12-14 PROCEDURE — 3008F BODY MASS INDEX DOCD: CPT | Performed by: SURGERY

## 2022-12-14 PROCEDURE — 3078F DIAST BP <80 MM HG: CPT | Performed by: SURGERY

## 2023-02-15 ENCOUNTER — LAB ENCOUNTER (OUTPATIENT)
Dept: LAB | Facility: HOSPITAL | Age: 73
End: 2023-02-15
Attending: FAMILY MEDICINE
Payer: COMMERCIAL

## 2023-02-15 ENCOUNTER — HOSPITAL ENCOUNTER (OUTPATIENT)
Dept: GENERAL RADIOLOGY | Facility: HOSPITAL | Age: 73
Discharge: HOME | End: 2023-02-15
Attending: FAMILY MEDICINE
Payer: COMMERCIAL

## 2023-02-15 DIAGNOSIS — R05.8 OTHER COUGH: ICD-10-CM

## 2023-02-15 DIAGNOSIS — E78.5 DYSLIPIDEMIA: ICD-10-CM

## 2023-02-15 DIAGNOSIS — E83.52 SERUM CALCIUM ELEVATED: ICD-10-CM

## 2023-02-15 DIAGNOSIS — Z01.818 PRE-OPERATIVE GENERAL PHYSICAL EXAMINATION: ICD-10-CM

## 2023-02-15 DIAGNOSIS — Z00.00 ROUTINE MEDICAL EXAM: ICD-10-CM

## 2023-02-15 DIAGNOSIS — Z01.818 PRE-OP EXAMINATION: ICD-10-CM

## 2023-02-15 DIAGNOSIS — E83.52 HYPERCALCEMIA: ICD-10-CM

## 2023-02-15 DIAGNOSIS — E55.9 VITAMIN D DEFICIENCY: ICD-10-CM

## 2023-02-15 LAB
ALBUMIN SERPL-MCNC: 4 G/DL (ref 3.4–5)
ALBUMIN/GLOB SERPL: 1.1 {RATIO} (ref 1–2)
ALP LIVER SERPL-CCNC: 94 U/L
ALT SERPL-CCNC: 22 U/L
ANION GAP SERPL CALC-SCNC: 5 MMOL/L (ref 0–18)
APTT PPP: 25.7 SECONDS (ref 23.3–35.6)
AST SERPL-CCNC: 20 U/L (ref 15–37)
ATRIAL RATE: 72 BPM
BASOPHILS # BLD AUTO: 0.02 X10(3) UL (ref 0–0.2)
BASOPHILS NFR BLD AUTO: 0.5 %
BILIRUB SERPL-MCNC: 0.5 MG/DL (ref 0.1–2)
BILIRUB UR QL: NEGATIVE
BUN BLD-MCNC: 14 MG/DL (ref 7–18)
BUN/CREAT SERPL: 15.6 (ref 10–20)
CALCIUM BLD-MCNC: 9.6 MG/DL (ref 8.5–10.1)
CHLORIDE SERPL-SCNC: 109 MMOL/L (ref 98–112)
CHOLEST SERPL-MCNC: 185 MG/DL (ref ?–200)
CLARITY UR: CLEAR
CO2 SERPL-SCNC: 29 MMOL/L (ref 21–32)
COLOR UR: YELLOW
CREAT BLD-MCNC: 0.9 MG/DL
DEPRECATED RDW RBC AUTO: 42.5 FL (ref 35.1–46.3)
EOSINOPHIL # BLD AUTO: 0.06 X10(3) UL (ref 0–0.7)
EOSINOPHIL NFR BLD AUTO: 1.6 %
ERYTHROCYTE [DISTWIDTH] IN BLOOD BY AUTOMATED COUNT: 12.3 % (ref 11–15)
FASTING PATIENT LIPID ANSWER: NO
FASTING STATUS PATIENT QL REPORTED: NO
GFR SERPLBLD BASED ON 1.73 SQ M-ARVRAT: 68 ML/MIN/1.73M2 (ref 60–?)
GLOBULIN PLAS-MCNC: 3.6 G/DL (ref 2.8–4.4)
GLUCOSE BLD-MCNC: 105 MG/DL (ref 70–99)
GLUCOSE UR-MCNC: NEGATIVE MG/DL
HCT VFR BLD AUTO: 40.3 %
HDLC SERPL-MCNC: 52 MG/DL (ref 40–59)
HGB BLD-MCNC: 13 G/DL
IMM GRANULOCYTES # BLD AUTO: 0.01 X10(3) UL (ref 0–1)
IMM GRANULOCYTES NFR BLD: 0.3 %
INR BLD: 0.93 (ref 0.85–1.16)
LDLC SERPL CALC-MCNC: 102 MG/DL (ref ?–100)
LEUKOCYTE ESTERASE UR QL STRIP.AUTO: NEGATIVE
LYMPHOCYTES # BLD AUTO: 1.61 X10(3) UL (ref 1–4)
LYMPHOCYTES NFR BLD AUTO: 44.2 %
MCH RBC QN AUTO: 30.6 PG (ref 26–34)
MCHC RBC AUTO-ENTMCNC: 32.3 G/DL (ref 31–37)
MCV RBC AUTO: 94.8 FL
MONOCYTES # BLD AUTO: 0.26 X10(3) UL (ref 0.1–1)
MONOCYTES NFR BLD AUTO: 7.1 %
NEUTROPHILS # BLD AUTO: 1.68 X10 (3) UL (ref 1.5–7.7)
NEUTROPHILS # BLD AUTO: 1.68 X10(3) UL (ref 1.5–7.7)
NEUTROPHILS NFR BLD AUTO: 46.3 %
NITRITE UR QL STRIP.AUTO: NEGATIVE
NONHDLC SERPL-MCNC: 133 MG/DL (ref ?–130)
OSMOLALITY SERPL CALC.SUM OF ELEC: 297 MOSM/KG (ref 275–295)
P AXIS: 55 DEGREES
P-R INTERVAL: 168 MS
PH UR: 5 [PH] (ref 5–8)
PLATELET # BLD AUTO: 235 10(3)UL (ref 150–450)
POTASSIUM SERPL-SCNC: 3.9 MMOL/L (ref 3.5–5.1)
PROT SERPL-MCNC: 7.6 G/DL (ref 6.4–8.2)
PROT UR-MCNC: 100 MG/DL
PROTHROMBIN TIME: 12.4 SECONDS (ref 11.6–14.8)
Q-T INTERVAL: 440 MS
QRS DURATION: 88 MS
QTC CALCULATION (BEZET): 481 MS
R AXIS: -17 DEGREES
RBC # BLD AUTO: 4.25 X10(6)UL
SODIUM SERPL-SCNC: 143 MMOL/L (ref 136–145)
SP GR UR STRIP: >1.03 (ref 1–1.03)
T AXIS: 30 DEGREES
TRIGL SERPL-MCNC: 178 MG/DL (ref 30–149)
TSI SER-ACNC: 0.93 MIU/ML (ref 0.36–3.74)
UROBILINOGEN UR STRIP-ACNC: <2
VENTRICULAR RATE: 72 BPM
VIT C UR-MCNC: NEGATIVE MG/DL
VIT D+METAB SERPL-MCNC: 29.3 NG/ML (ref 30–100)
VLDLC SERPL CALC-MCNC: 30 MG/DL (ref 0–30)
WBC # BLD AUTO: 3.6 X10(3) UL (ref 4–11)

## 2023-02-15 PROCEDURE — 36415 COLL VENOUS BLD VENIPUNCTURE: CPT

## 2023-02-15 PROCEDURE — 84443 ASSAY THYROID STIM HORMONE: CPT

## 2023-02-15 PROCEDURE — 85025 COMPLETE CBC W/AUTO DIFF WBC: CPT

## 2023-02-15 PROCEDURE — 80061 LIPID PANEL: CPT

## 2023-02-15 PROCEDURE — 81001 URINALYSIS AUTO W/SCOPE: CPT

## 2023-02-15 PROCEDURE — 82306 VITAMIN D 25 HYDROXY: CPT

## 2023-02-15 PROCEDURE — 71046 X-RAY EXAM CHEST 2 VIEWS: CPT | Performed by: FAMILY MEDICINE

## 2023-02-15 PROCEDURE — 93005 ELECTROCARDIOGRAM TRACING: CPT

## 2023-02-15 PROCEDURE — 85730 THROMBOPLASTIN TIME PARTIAL: CPT

## 2023-02-15 PROCEDURE — 93010 ELECTROCARDIOGRAM REPORT: CPT | Performed by: INTERNAL MEDICINE

## 2023-02-15 PROCEDURE — 85610 PROTHROMBIN TIME: CPT

## 2023-02-15 PROCEDURE — 80053 COMPREHEN METABOLIC PANEL: CPT

## 2023-02-20 ENCOUNTER — NURSE ONLY (OUTPATIENT)
Dept: FAMILY MEDICINE CLINIC | Facility: CLINIC | Age: 73
End: 2023-02-20
Payer: COMMERCIAL

## 2023-02-20 PROCEDURE — 90471 IMMUNIZATION ADMIN: CPT | Performed by: NURSE PRACTITIONER

## 2023-02-20 PROCEDURE — 90677 PCV20 VACCINE IM: CPT | Performed by: NURSE PRACTITIONER

## 2023-02-20 PROCEDURE — 90472 IMMUNIZATION ADMIN EACH ADD: CPT | Performed by: NURSE PRACTITIONER

## 2023-02-20 PROCEDURE — 90750 HZV VACC RECOMBINANT IM: CPT | Performed by: NURSE PRACTITIONER

## 2023-02-20 NOTE — PROGRESS NOTES
Patient in office today to receive 2 vaccines shingles and Prevnar. Patient was brought back to exam room 3 where she was given both injections in right arm due to lymh node surgery recently done of left arm. Patient waited the allotted 15min and denies any adverse  reactions to vaccine, patient appeared well and was educated of vaccines, told to call office if any fever, rash or extreme swelling. Patient expresses understanding. administered by Wilver Dickens.

## 2023-03-02 ENCOUNTER — OFFICE VISIT (OUTPATIENT)
Dept: FAMILY MEDICINE CLINIC | Facility: CLINIC | Age: 73
End: 2023-03-02
Payer: COMMERCIAL

## 2023-03-02 ENCOUNTER — TELEPHONE (OUTPATIENT)
Dept: FAMILY MEDICINE CLINIC | Facility: CLINIC | Age: 73
End: 2023-03-02

## 2023-03-02 VITALS
BODY MASS INDEX: 26.43 KG/M2 | DIASTOLIC BLOOD PRESSURE: 86 MMHG | SYSTOLIC BLOOD PRESSURE: 122 MMHG | HEIGHT: 65 IN | WEIGHT: 158.63 LBS | RESPIRATION RATE: 16 BRPM | OXYGEN SATURATION: 99 % | HEART RATE: 74 BPM

## 2023-03-02 DIAGNOSIS — E55.9 VITAMIN D DEFICIENCY: ICD-10-CM

## 2023-03-02 DIAGNOSIS — Z01.818 PRE-OPERATIVE EXAM: Primary | ICD-10-CM

## 2023-03-02 DIAGNOSIS — R79.89 ABNORMAL CBC: ICD-10-CM

## 2023-03-02 DIAGNOSIS — E78.5 DYSLIPIDEMIA: ICD-10-CM

## 2023-03-02 DIAGNOSIS — F41.1 GENERALIZED ANXIETY DISORDER: ICD-10-CM

## 2023-03-02 DIAGNOSIS — Z85.3 HISTORY OF BREAST CANCER: ICD-10-CM

## 2023-03-02 DIAGNOSIS — G47.09 OTHER INSOMNIA: ICD-10-CM

## 2023-03-02 PROCEDURE — 3074F SYST BP LT 130 MM HG: CPT | Performed by: NURSE PRACTITIONER

## 2023-03-02 PROCEDURE — 3008F BODY MASS INDEX DOCD: CPT | Performed by: NURSE PRACTITIONER

## 2023-03-02 PROCEDURE — 3079F DIAST BP 80-89 MM HG: CPT | Performed by: NURSE PRACTITIONER

## 2023-03-02 PROCEDURE — 99214 OFFICE O/P EST MOD 30 MIN: CPT | Performed by: NURSE PRACTITIONER

## 2023-03-02 NOTE — TELEPHONE ENCOUNTER
Please fax my H&P, labs, chest x-ray, and EKG to Dr. Kyle Vargas at [phone]. Surgery date 3/30/23. Thanks!

## 2023-03-13 DIAGNOSIS — K21.9 GASTROESOPHAGEAL REFLUX DISEASE WITHOUT ESOPHAGITIS: ICD-10-CM

## 2023-03-16 RX ORDER — PANTOPRAZOLE SODIUM 40 MG/1
40 TABLET, DELAYED RELEASE ORAL DAILY
Qty: 90 TABLET | Refills: 1 | Status: SHIPPED | OUTPATIENT
Start: 2023-03-16

## 2023-03-21 ENCOUNTER — OFFICE VISIT (OUTPATIENT)
Dept: HEMATOLOGY/ONCOLOGY | Facility: HOSPITAL | Age: 73
End: 2023-03-21
Attending: INTERNAL MEDICINE
Payer: COMMERCIAL

## 2023-03-21 VITALS
RESPIRATION RATE: 16 BRPM | HEART RATE: 77 BPM | HEIGHT: 65 IN | SYSTOLIC BLOOD PRESSURE: 135 MMHG | WEIGHT: 154 LBS | TEMPERATURE: 98 F | DIASTOLIC BLOOD PRESSURE: 71 MMHG | OXYGEN SATURATION: 97 % | BODY MASS INDEX: 25.66 KG/M2

## 2023-03-21 DIAGNOSIS — C50.112 MALIGNANT NEOPLASM OF CENTRAL PORTION OF LEFT BREAST IN FEMALE, ESTROGEN RECEPTOR POSITIVE (HCC): Primary | ICD-10-CM

## 2023-03-21 DIAGNOSIS — Z51.81 ENCOUNTER FOR MONITORING AROMATASE INHIBITOR THERAPY: ICD-10-CM

## 2023-03-21 DIAGNOSIS — Z79.811 ENCOUNTER FOR MONITORING AROMATASE INHIBITOR THERAPY: ICD-10-CM

## 2023-03-21 DIAGNOSIS — Z17.0 MALIGNANT NEOPLASM OF CENTRAL PORTION OF LEFT BREAST IN FEMALE, ESTROGEN RECEPTOR POSITIVE (HCC): Primary | ICD-10-CM

## 2023-03-21 DIAGNOSIS — M85.80 OSTEOPENIA, UNSPECIFIED LOCATION: ICD-10-CM

## 2023-03-21 DIAGNOSIS — F41.1 GENERALIZED ANXIETY DISORDER: ICD-10-CM

## 2023-03-21 DIAGNOSIS — Z90.13 H/O BILATERAL MASTECTOMY: ICD-10-CM

## 2023-03-21 PROCEDURE — 99214 OFFICE O/P EST MOD 30 MIN: CPT | Performed by: INTERNAL MEDICINE

## 2023-03-21 RX ORDER — VENLAFAXINE HYDROCHLORIDE 37.5 MG/1
37.5 CAPSULE, EXTENDED RELEASE ORAL DAILY
Qty: 90 CAPSULE | Refills: 3 | Status: SHIPPED | OUTPATIENT
Start: 2023-03-21 | End: 2024-03-15

## 2023-05-10 DIAGNOSIS — K21.9 GASTROESOPHAGEAL REFLUX DISEASE WITHOUT ESOPHAGITIS: ICD-10-CM

## 2023-05-10 RX ORDER — VITAMIN B COMPLEX
1 TABLET ORAL DAILY
Qty: 90 TABLET | Refills: 0 | Status: SHIPPED | OUTPATIENT
Start: 2023-05-10

## 2023-05-10 RX ORDER — PANTOPRAZOLE SODIUM 40 MG/1
40 TABLET, DELAYED RELEASE ORAL DAILY
Qty: 90 TABLET | Refills: 1 | Status: CANCELLED | OUTPATIENT
Start: 2023-05-10

## 2023-06-22 ENCOUNTER — TELEPHONE (OUTPATIENT)
Dept: FAMILY MEDICINE CLINIC | Facility: CLINIC | Age: 73
End: 2023-06-22

## 2023-06-22 ENCOUNTER — OFFICE VISIT (OUTPATIENT)
Dept: FAMILY MEDICINE CLINIC | Facility: CLINIC | Age: 73
End: 2023-06-22
Payer: COMMERCIAL

## 2023-06-22 VITALS
WEIGHT: 152 LBS | OXYGEN SATURATION: 96 % | DIASTOLIC BLOOD PRESSURE: 84 MMHG | SYSTOLIC BLOOD PRESSURE: 128 MMHG | HEIGHT: 65 IN | BODY MASS INDEX: 25.33 KG/M2 | RESPIRATION RATE: 16 BRPM | TEMPERATURE: 98 F | HEART RATE: 81 BPM

## 2023-06-22 DIAGNOSIS — R03.0 BORDERLINE HYPERTENSION: ICD-10-CM

## 2023-06-22 DIAGNOSIS — H25.9 AGE-RELATED CATARACT OF BOTH EYES, UNSPECIFIED AGE-RELATED CATARACT TYPE: ICD-10-CM

## 2023-06-22 DIAGNOSIS — Z01.818 PRE-OP EXAM: Primary | ICD-10-CM

## 2023-06-22 DIAGNOSIS — Z79.899 LONG-TERM USE OF HIGH-RISK MEDICATION: ICD-10-CM

## 2023-06-22 DIAGNOSIS — G47.09 OTHER INSOMNIA: ICD-10-CM

## 2023-06-22 PROBLEM — Z90.13 H/O BILATERAL MASTECTOMY: Status: RESOLVED | Noted: 2021-11-05 | Resolved: 2023-06-22

## 2023-06-22 PROBLEM — R23.8 INTRINSIC AGING OF FACIAL SKIN: Status: RESOLVED | Noted: 2022-03-03 | Resolved: 2023-06-22

## 2023-06-22 PROBLEM — R51.9 HEADACHE, UNSPECIFIED HEADACHE TYPE: Status: RESOLVED | Noted: 2022-02-03 | Resolved: 2023-06-22

## 2023-06-22 PROCEDURE — 99214 OFFICE O/P EST MOD 30 MIN: CPT | Performed by: NURSE PRACTITIONER

## 2023-06-22 PROCEDURE — 3079F DIAST BP 80-89 MM HG: CPT | Performed by: NURSE PRACTITIONER

## 2023-06-22 PROCEDURE — 3074F SYST BP LT 130 MM HG: CPT | Performed by: NURSE PRACTITIONER

## 2023-06-22 PROCEDURE — 3008F BODY MASS INDEX DOCD: CPT | Performed by: NURSE PRACTITIONER

## 2023-06-22 RX ORDER — MULTIVIT WITH MINERALS/LUTEIN
1000 TABLET ORAL DAILY
COMMUNITY

## 2023-06-22 RX ORDER — CHLORAL HYDRATE 500 MG
1000 CAPSULE ORAL DAILY
COMMUNITY

## 2023-06-22 RX ORDER — ESZOPICLONE 2 MG/1
2 TABLET, FILM COATED ORAL NIGHTLY PRN
Qty: 90 TABLET | Refills: 1 | Status: SHIPPED | OUTPATIENT
Start: 2023-06-22

## 2023-06-22 RX ORDER — ZINC SULFATE 50(220)MG
220 CAPSULE ORAL DAILY
COMMUNITY

## 2023-06-22 NOTE — TELEPHONE ENCOUNTER
Please fax my H&P from today, EKG from February 2023, and form (handed to [place]) to eye surgeon's office. Number is on form. Thanks!

## 2023-06-26 ENCOUNTER — TELEPHONE (OUTPATIENT)
Dept: FAMILY MEDICINE CLINIC | Facility: CLINIC | Age: 73
End: 2023-06-26

## 2023-08-07 ENCOUNTER — OFFICE VISIT (OUTPATIENT)
Dept: SURGERY | Facility: CLINIC | Age: 73
End: 2023-08-07
Payer: COMMERCIAL

## 2023-08-07 VITALS
SYSTOLIC BLOOD PRESSURE: 104 MMHG | TEMPERATURE: 98 F | HEIGHT: 64.8 IN | OXYGEN SATURATION: 97 % | HEART RATE: 80 BPM | BODY MASS INDEX: 24.99 KG/M2 | DIASTOLIC BLOOD PRESSURE: 70 MMHG | WEIGHT: 150 LBS | RESPIRATION RATE: 16 BRPM

## 2023-08-07 DIAGNOSIS — C50.112 MALIGNANT NEOPLASM OF CENTRAL PORTION OF LEFT BREAST IN FEMALE, ESTROGEN RECEPTOR POSITIVE: Primary | ICD-10-CM

## 2023-08-07 DIAGNOSIS — Z90.13 H/O BILATERAL MASTECTOMY: ICD-10-CM

## 2023-08-07 DIAGNOSIS — Z17.0 MALIGNANT NEOPLASM OF CENTRAL PORTION OF LEFT BREAST IN FEMALE, ESTROGEN RECEPTOR POSITIVE: Primary | ICD-10-CM

## 2023-08-16 ENCOUNTER — TELEPHONE (OUTPATIENT)
Dept: INTEGRATIVE MEDICINE | Facility: CLINIC | Age: 73
End: 2023-08-16

## 2023-09-13 ENCOUNTER — MED REC SCAN ONLY (OUTPATIENT)
Dept: FAMILY MEDICINE CLINIC | Facility: CLINIC | Age: 73
End: 2023-09-13

## 2023-09-13 DIAGNOSIS — K21.9 GASTROESOPHAGEAL REFLUX DISEASE WITHOUT ESOPHAGITIS: ICD-10-CM

## 2023-09-14 RX ORDER — PANTOPRAZOLE SODIUM 40 MG/1
40 TABLET, DELAYED RELEASE ORAL DAILY
Qty: 90 TABLET | Refills: 1 | Status: SHIPPED | OUTPATIENT
Start: 2023-09-14

## 2023-09-25 ENCOUNTER — APPOINTMENT (OUTPATIENT)
Dept: HEMATOLOGY/ONCOLOGY | Facility: HOSPITAL | Age: 73
End: 2023-09-25
Attending: INTERNAL MEDICINE
Payer: COMMERCIAL

## 2023-10-10 ENCOUNTER — HOSPITAL ENCOUNTER (OUTPATIENT)
Dept: BONE DENSITY | Age: 73
Discharge: HOME | End: 2023-10-10
Attending: INTERNAL MEDICINE
Payer: COMMERCIAL

## 2023-10-10 DIAGNOSIS — M85.80 OSTEOPENIA, UNSPECIFIED LOCATION: ICD-10-CM

## 2023-10-10 PROCEDURE — 77080 DXA BONE DENSITY AXIAL: CPT | Performed by: INTERNAL MEDICINE

## 2023-10-12 ENCOUNTER — OFFICE VISIT (OUTPATIENT)
Dept: FAMILY MEDICINE CLINIC | Facility: CLINIC | Age: 73
End: 2023-10-12

## 2023-10-12 ENCOUNTER — TELEPHONE (OUTPATIENT)
Dept: FAMILY MEDICINE CLINIC | Facility: CLINIC | Age: 73
End: 2023-10-12

## 2023-10-12 DIAGNOSIS — Z01.818 PREOP EXAMINATION: ICD-10-CM

## 2023-10-12 DIAGNOSIS — M81.0 AGE-RELATED OSTEOPOROSIS WITHOUT CURRENT PATHOLOGICAL FRACTURE: Primary | ICD-10-CM

## 2023-10-12 DIAGNOSIS — R23.8 INTRINSIC AGING OF FACIAL SKIN: Primary | ICD-10-CM

## 2023-10-12 DIAGNOSIS — R23.8 INTRINSIC AGING OF FACIAL SKIN: ICD-10-CM

## 2023-10-12 PROBLEM — R94.31 ABNORMAL ECG: Status: ACTIVE | Noted: 2021-09-22

## 2023-10-12 RX ORDER — ALENDRONATE SODIUM 70 MG/1
70 TABLET ORAL
Qty: 12 TABLET | Refills: 1 | Status: SHIPPED | OUTPATIENT
Start: 2023-10-12 | End: 2024-04-09

## 2023-10-12 RX ORDER — TRETINOIN 1 MG/G
1 CREAM TOPICAL NIGHTLY
Qty: 45 G | Refills: 1 | Status: SHIPPED | OUTPATIENT
Start: 2023-10-12

## 2023-10-12 RX ORDER — ESTRADIOL 0.5 MG/1
0.5 TABLET ORAL DAILY
COMMUNITY
Start: 2023-06-13

## 2023-10-22 DIAGNOSIS — M81.0 OSTEOPOROSIS, POST-MENOPAUSAL: Primary | ICD-10-CM

## 2023-11-02 ENCOUNTER — LAB ENCOUNTER (OUTPATIENT)
Dept: LAB | Age: 73
End: 2023-11-02
Attending: NURSE PRACTITIONER
Payer: COMMERCIAL

## 2023-11-02 ENCOUNTER — OFFICE VISIT (OUTPATIENT)
Dept: FAMILY MEDICINE CLINIC | Facility: CLINIC | Age: 73
End: 2023-11-02

## 2023-11-02 VITALS
DIASTOLIC BLOOD PRESSURE: 72 MMHG | SYSTOLIC BLOOD PRESSURE: 119 MMHG | BODY MASS INDEX: 26.4 KG/M2 | OXYGEN SATURATION: 97 % | TEMPERATURE: 97 F | HEIGHT: 64 IN | WEIGHT: 154.63 LBS | HEART RATE: 64 BPM

## 2023-11-02 DIAGNOSIS — M81.0 OSTEOPOROSIS, POST-MENOPAUSAL: ICD-10-CM

## 2023-11-02 DIAGNOSIS — R23.8 INTRINSIC AGING OF FACIAL SKIN: Primary | ICD-10-CM

## 2023-11-02 DIAGNOSIS — E55.9 VITAMIN D DEFICIENCY: Primary | ICD-10-CM

## 2023-11-02 DIAGNOSIS — Z23 ENCOUNTER FOR IMMUNIZATION: ICD-10-CM

## 2023-11-02 LAB — VIT D+METAB SERPL-MCNC: 19.9 NG/ML (ref 30–100)

## 2023-11-02 PROCEDURE — 82306 VITAMIN D 25 HYDROXY: CPT

## 2023-11-02 PROCEDURE — 90662 IIV NO PRSV INCREASED AG IM: CPT | Performed by: NURSE PRACTITIONER

## 2023-11-02 PROCEDURE — 36415 COLL VENOUS BLD VENIPUNCTURE: CPT

## 2023-11-02 PROCEDURE — 90471 IMMUNIZATION ADMIN: CPT | Performed by: NURSE PRACTITIONER

## 2023-11-02 RX ORDER — ERGOCALCIFEROL 1.25 MG/1
50000 CAPSULE ORAL WEEKLY
Qty: 4 CAPSULE | Refills: 0 | Status: SHIPPED | OUTPATIENT
Start: 2023-11-02 | End: 2023-12-02

## 2023-11-08 ENCOUNTER — APPOINTMENT (OUTPATIENT)
Dept: HEMATOLOGY/ONCOLOGY | Facility: HOSPITAL | Age: 73
End: 2023-11-08
Attending: INTERNAL MEDICINE
Payer: COMMERCIAL

## 2023-11-09 ENCOUNTER — APPOINTMENT (OUTPATIENT)
Dept: HEMATOLOGY/ONCOLOGY | Facility: HOSPITAL | Age: 73
End: 2023-11-09
Attending: INTERNAL MEDICINE
Payer: COMMERCIAL

## 2023-11-29 DIAGNOSIS — E55.9 VITAMIN D DEFICIENCY: ICD-10-CM

## 2023-12-01 RX ORDER — ERGOCALCIFEROL 1.25 MG/1
50000 CAPSULE ORAL WEEKLY
Qty: 4 CAPSULE | Refills: 0 | Status: SHIPPED | OUTPATIENT
Start: 2023-12-01

## 2023-12-01 NOTE — TELEPHONE ENCOUNTER
A refill request was received for:  Requested Prescriptions     Pending Prescriptions Disp Refills    ERGOCALCIFEROL 1.25 MG (13143 UT) Oral Cap [Pharmacy Med Name: VITAMIN D2 1.25MG(50,000 UNIT)] 4 capsule 0     Sig: TAKE 1 CAPSULE BY MOUTH ONCE WEEKLY     Last refill date:  11/2/23   Qty: 4 capsule   Last office visit: 11/2/23     Future Appointments   Date Time Provider [facility]   12/13/2023 12:00 PM Vilma Richmond MD [address] [place] [address] ONC [facility]   12/18/2023 11:00 AM [facility] [place]   12/18/2023 11:30 AM [facility] [place]   12/21/2023  2:00 PM ALIZA Curtis [place]   12/21/2023  2:30 PM ALIZA Curtis [place]   8/7/2024 10:45 AM Adriana Langford MD [facility] [place]

## 2023-12-13 ENCOUNTER — OFFICE VISIT (OUTPATIENT)
Dept: HEMATOLOGY/ONCOLOGY | Facility: HOSPITAL | Age: 73
End: 2023-12-13
Attending: INTERNAL MEDICINE
Payer: COMMERCIAL

## 2023-12-13 VITALS
RESPIRATION RATE: 18 BRPM | BODY MASS INDEX: 26.53 KG/M2 | WEIGHT: 155.38 LBS | DIASTOLIC BLOOD PRESSURE: 68 MMHG | HEART RATE: 66 BPM | TEMPERATURE: 98 F | SYSTOLIC BLOOD PRESSURE: 130 MMHG | HEIGHT: 64 IN | OXYGEN SATURATION: 96 %

## 2023-12-13 DIAGNOSIS — Z79.811 ENCOUNTER FOR MONITORING AROMATASE INHIBITOR THERAPY: ICD-10-CM

## 2023-12-13 DIAGNOSIS — Z17.0 MALIGNANT NEOPLASM OF CENTRAL PORTION OF LEFT BREAST IN FEMALE, ESTROGEN RECEPTOR POSITIVE  (HCC): Primary | ICD-10-CM

## 2023-12-13 DIAGNOSIS — C50.112 MALIGNANT NEOPLASM OF CENTRAL PORTION OF LEFT BREAST IN FEMALE, ESTROGEN RECEPTOR POSITIVE  (HCC): Primary | ICD-10-CM

## 2023-12-13 DIAGNOSIS — Z08 ENCOUNTER FOR FOLLOW-UP EXAMINATION AFTER COMPLETED TREATMENT FOR MALIGNANT NEOPLASM: ICD-10-CM

## 2023-12-13 DIAGNOSIS — Z51.81 ENCOUNTER FOR MONITORING AROMATASE INHIBITOR THERAPY: ICD-10-CM

## 2023-12-13 DIAGNOSIS — Z90.13 H/O BILATERAL MASTECTOMY: ICD-10-CM

## 2023-12-13 DIAGNOSIS — M85.80 OSTEOPENIA, UNSPECIFIED LOCATION: ICD-10-CM

## 2023-12-13 DIAGNOSIS — E55.9 VITAMIN D DEFICIENCY: ICD-10-CM

## 2023-12-13 PROCEDURE — 99214 OFFICE O/P EST MOD 30 MIN: CPT | Performed by: INTERNAL MEDICINE

## 2023-12-13 RX ORDER — ERGOCALCIFEROL 1.25 MG/1
50000 CAPSULE ORAL WEEKLY
Qty: 4 CAPSULE | Refills: 5 | Status: SHIPPED | OUTPATIENT
Start: 2023-12-13

## 2023-12-18 ENCOUNTER — EKG ENCOUNTER (OUTPATIENT)
Dept: LAB | Age: 73
End: 2023-12-18
Attending: NURSE PRACTITIONER
Payer: COMMERCIAL

## 2023-12-18 ENCOUNTER — LAB ENCOUNTER (OUTPATIENT)
Dept: LAB | Age: 73
End: 2023-12-18
Attending: NURSE PRACTITIONER
Payer: COMMERCIAL

## 2023-12-18 DIAGNOSIS — Z01.818 PREOP EXAMINATION: ICD-10-CM

## 2023-12-18 LAB
ANION GAP SERPL CALC-SCNC: 7 MMOL/L (ref 0–18)
APTT PPP: 26.1 SECONDS (ref 23.3–35.6)
ATRIAL RATE: 72 BPM
BASOPHILS # BLD AUTO: 0.04 X10(3) UL (ref 0–0.2)
BASOPHILS NFR BLD AUTO: 1.1 %
BUN BLD-MCNC: 12 MG/DL (ref 9–23)
BUN/CREAT SERPL: 13.2 (ref 10–20)
CALCIUM BLD-MCNC: 10 MG/DL (ref 8.7–10.4)
CHLORIDE SERPL-SCNC: 104 MMOL/L (ref 98–112)
CO2 SERPL-SCNC: 28 MMOL/L (ref 21–32)
CREAT BLD-MCNC: 0.91 MG/DL
DEPRECATED RDW RBC AUTO: 39.6 FL (ref 35.1–46.3)
EGFRCR SERPLBLD CKD-EPI 2021: 67 ML/MIN/1.73M2 (ref 60–?)
EOSINOPHIL # BLD AUTO: 0.13 X10(3) UL (ref 0–0.7)
EOSINOPHIL NFR BLD AUTO: 3.4 %
ERYTHROCYTE [DISTWIDTH] IN BLOOD BY AUTOMATED COUNT: 11.7 % (ref 11–15)
FASTING STATUS PATIENT QL REPORTED: YES
GLUCOSE BLD-MCNC: 98 MG/DL (ref 70–99)
HCT VFR BLD AUTO: 37.7 %
HGB BLD-MCNC: 12.7 G/DL
IMM GRANULOCYTES # BLD AUTO: 0.01 X10(3) UL (ref 0–1)
IMM GRANULOCYTES NFR BLD: 0.3 %
INR BLD: 0.91 (ref 0.8–1.2)
LYMPHOCYTES # BLD AUTO: 1.51 X10(3) UL (ref 1–4)
LYMPHOCYTES NFR BLD AUTO: 39.9 %
MCH RBC QN AUTO: 31.1 PG (ref 26–34)
MCHC RBC AUTO-ENTMCNC: 33.7 G/DL (ref 31–37)
MCV RBC AUTO: 92.4 FL
MONOCYTES # BLD AUTO: 0.29 X10(3) UL (ref 0.1–1)
MONOCYTES NFR BLD AUTO: 7.7 %
NEUTROPHILS # BLD AUTO: 1.8 X10 (3) UL (ref 1.5–7.7)
NEUTROPHILS # BLD AUTO: 1.8 X10(3) UL (ref 1.5–7.7)
NEUTROPHILS NFR BLD AUTO: 47.6 %
OSMOLALITY SERPL CALC.SUM OF ELEC: 288 MOSM/KG (ref 275–295)
P AXIS: 63 DEGREES
P-R INTERVAL: 174 MS
PLATELET # BLD AUTO: 269 10(3)UL (ref 150–450)
POTASSIUM SERPL-SCNC: 4.5 MMOL/L (ref 3.5–5.1)
PROTHROMBIN TIME: 12.8 SECONDS (ref 11.6–14.8)
Q-T INTERVAL: 432 MS
QRS DURATION: 88 MS
QTC CALCULATION (BEZET): 473 MS
R AXIS: -25 DEGREES
RBC # BLD AUTO: 4.08 X10(6)UL
SODIUM SERPL-SCNC: 139 MMOL/L (ref 136–145)
T AXIS: 24 DEGREES
VENTRICULAR RATE: 72 BPM
WBC # BLD AUTO: 3.8 X10(3) UL (ref 4–11)

## 2023-12-18 PROCEDURE — 85610 PROTHROMBIN TIME: CPT | Performed by: NURSE PRACTITIONER

## 2023-12-18 PROCEDURE — 93010 ELECTROCARDIOGRAM REPORT: CPT | Performed by: INTERNAL MEDICINE

## 2023-12-18 PROCEDURE — 93005 ELECTROCARDIOGRAM TRACING: CPT

## 2023-12-18 PROCEDURE — 80048 BASIC METABOLIC PNL TOTAL CA: CPT | Performed by: NURSE PRACTITIONER

## 2023-12-18 PROCEDURE — 85025 COMPLETE CBC W/AUTO DIFF WBC: CPT | Performed by: NURSE PRACTITIONER

## 2023-12-18 PROCEDURE — 85730 THROMBOPLASTIN TIME PARTIAL: CPT | Performed by: NURSE PRACTITIONER

## 2023-12-21 ENCOUNTER — OFFICE VISIT (OUTPATIENT)
Dept: FAMILY MEDICINE CLINIC | Facility: CLINIC | Age: 73
End: 2023-12-21
Payer: COMMERCIAL

## 2023-12-21 ENCOUNTER — TELEPHONE (OUTPATIENT)
Dept: FAMILY MEDICINE CLINIC | Facility: CLINIC | Age: 73
End: 2023-12-21

## 2023-12-21 ENCOUNTER — OFFICE VISIT (OUTPATIENT)
Dept: FAMILY MEDICINE CLINIC | Facility: CLINIC | Age: 73
End: 2023-12-21

## 2023-12-21 VITALS
OXYGEN SATURATION: 97 % | BODY MASS INDEX: 26.74 KG/M2 | WEIGHT: 156.63 LBS | HEIGHT: 64 IN | SYSTOLIC BLOOD PRESSURE: 116 MMHG | DIASTOLIC BLOOD PRESSURE: 70 MMHG | HEART RATE: 95 BPM

## 2023-12-21 DIAGNOSIS — Z01.818 PREOP EXAMINATION: Primary | ICD-10-CM

## 2023-12-21 DIAGNOSIS — R23.8 INTRINSIC AGING OF FACIAL SKIN: Primary | ICD-10-CM

## 2023-12-21 PROCEDURE — 3078F DIAST BP <80 MM HG: CPT | Performed by: NURSE PRACTITIONER

## 2023-12-21 PROCEDURE — 3008F BODY MASS INDEX DOCD: CPT | Performed by: NURSE PRACTITIONER

## 2023-12-21 PROCEDURE — 1126F AMNT PAIN NOTED NONE PRSNT: CPT | Performed by: NURSE PRACTITIONER

## 2023-12-21 PROCEDURE — 3074F SYST BP LT 130 MM HG: CPT | Performed by: NURSE PRACTITIONER

## 2023-12-21 PROCEDURE — 99213 OFFICE O/P EST LOW 20 MIN: CPT | Performed by: NURSE PRACTITIONER

## 2023-12-21 NOTE — TELEPHONE ENCOUNTER
Please fax today's H&P, EKG, and labs to Dr. Irene Abreu at [phone]. Surgery date 1/15/24. Thanks!

## 2023-12-26 ENCOUNTER — TELEPHONE (OUTPATIENT)
Dept: ORTHOPEDICS CLINIC | Facility: CLINIC | Age: 73
End: 2023-12-26

## 2023-12-26 ENCOUNTER — PATIENT MESSAGE (OUTPATIENT)
Dept: FAMILY MEDICINE CLINIC | Facility: CLINIC | Age: 73
End: 2023-12-26

## 2023-12-26 ENCOUNTER — TELEPHONE (OUTPATIENT)
Dept: FAMILY MEDICINE CLINIC | Facility: CLINIC | Age: 73
End: 2023-12-26

## 2023-12-26 DIAGNOSIS — S42.294A OTHER CLOSED NONDISPLACED FRACTURE OF PROXIMAL END OF RIGHT HUMERUS, INITIAL ENCOUNTER: Primary | ICD-10-CM

## 2023-12-26 NOTE — TELEPHONE ENCOUNTER
Returning vm the pt is requesting ASAP Orthopedics for fx right arm yesterday. Pt stated has pain but did not receive pain medication Rx from [facility] ED. Pt is using a sling 24/7. Pt advised to call back [facility] ED to request Rx for pain medication. Pt denied numbness, pallor or cool digits to the right hand. Pt advised if develop swelling, increased pain, numbness or cool fingers to go immediately back to the ED. Pt voiced understanding. Consult with  Dr. Dick Chang recommended Dr. Mireya Villanueva or Dr. Joey Martinez. [facility] sent to the pt with recommendations.

## 2023-12-26 NOTE — TELEPHONE ENCOUNTER
Per Dr. Kaykay Brock \" Patient has a PPO insurance and would be able to follow-up with orthopedist of choice. A referral follow-up for revaluation and treatment as soon as possible. \"    [facility] sent with Dr. Kaykay Brock recommendations and advised to schedule an appointment as soon as possible. Per Dr. Maryana Paige's message the pt indicated has an appt for tomorrow at [address] This office advised the pt to keep that appt since they do not have soon appt availability.

## 2023-12-26 NOTE — TELEPHONE ENCOUNTER
S/w patient- She states she fractured arm after falling out of bed last night. Went to ER in [address] [place] [address] through [facility]. She was given a sling and told to follow up with ortho. She states that she has appointment tomorrow with another ortho group but was calling to see if we had anything. I informed her that unfortunately we do not and that she should keep her other appointment.  She verbalized understanding and had no other questions at this time

## 2023-12-26 NOTE — TELEPHONE ENCOUNTER
From: Patrick Henson  To: Farhana Soheila  Sent: 12/26/2023 11:06 AM CST  Subject: Fractured broken right arm    Braulio Reynolds, last night I had a bad fall and broke my right arm. The paramedics took me to [facility] in [facility] where they took x-rays to confirm I had a comminuted fracture of the head and neck of the proximal right humerus. They have referred me to an orthopedic, but I would prefer to stay within the [facility] medical system. Could you please refer me to an [facility] orthopedic as soon as possible,as I am in a great deal of pain.  Thank you Alia

## 2024-01-11 ENCOUNTER — OFFICE VISIT (OUTPATIENT)
Dept: FAMILY MEDICINE CLINIC | Facility: CLINIC | Age: 74
End: 2024-01-11

## 2024-01-11 DIAGNOSIS — G47.09 OTHER INSOMNIA: ICD-10-CM

## 2024-01-11 DIAGNOSIS — Z79.899 LONG-TERM USE OF HIGH-RISK MEDICATION: ICD-10-CM

## 2024-01-11 DIAGNOSIS — R23.8 INTRINSIC AGING OF FACIAL SKIN: Primary | ICD-10-CM

## 2024-01-11 RX ORDER — ESZOPICLONE 2 MG/1
2 TABLET, FILM COATED ORAL NIGHTLY PRN
Qty: 90 TABLET | Refills: 1 | Status: SHIPPED | OUTPATIENT
Start: 2024-01-11

## 2024-01-11 NOTE — PROGRESS NOTES
Patient tolerated microderm well with minimal erythema. Follow-up 3-4 weeks PRN for next treatment.

## 2024-01-22 ENCOUNTER — TELEPHONE (OUTPATIENT)
Dept: FAMILY MEDICINE CLINIC | Facility: CLINIC | Age: 74
End: 2024-01-22

## 2024-02-01 ENCOUNTER — OFFICE VISIT (OUTPATIENT)
Dept: FAMILY MEDICINE CLINIC | Facility: CLINIC | Age: 74
End: 2024-02-01

## 2024-02-01 DIAGNOSIS — R23.8 INTRINSIC AGING OF FACIAL SKIN: Primary | ICD-10-CM

## 2024-02-01 RX ORDER — TRAMADOL HYDROCHLORIDE 50 MG/1
1 TABLET ORAL EVERY 6 HOURS PRN
COMMUNITY
Start: 2024-01-02

## 2024-02-01 RX ORDER — ALENDRONATE SODIUM 70 MG/1
TABLET ORAL
COMMUNITY
Start: 2024-01-04

## 2024-02-01 RX ORDER — ONDANSETRON 4 MG/1
1 TABLET, FILM COATED ORAL EVERY 8 HOURS PRN
COMMUNITY
Start: 2023-12-27

## 2024-02-01 RX ORDER — HYDROCODONE BITARTRATE AND ACETAMINOPHEN 5; 325 MG/1; MG/1
1 TABLET ORAL
COMMUNITY
Start: 2023-12-26

## 2024-02-01 RX ORDER — CEPHALEXIN 500 MG/1
CAPSULE ORAL
COMMUNITY
Start: 2023-11-18

## 2024-02-01 RX ORDER — CYCLOBENZAPRINE HCL 10 MG
TABLET ORAL
COMMUNITY
Start: 2023-12-27

## 2024-02-01 RX ORDER — GENTAMICIN SULFATE 3 MG/ML
SOLUTION/ DROPS OPHTHALMIC
COMMUNITY
Start: 2023-11-18

## 2024-02-01 RX ORDER — ACETAMINOPHEN 500 MG
1000 TABLET ORAL EVERY 6 HOURS
COMMUNITY
Start: 2023-12-27

## 2024-02-01 RX ORDER — OXYCODONE HYDROCHLORIDE 5 MG/1
1 TABLET ORAL EVERY 6 HOURS PRN
COMMUNITY
Start: 2024-01-02

## 2024-02-01 NOTE — PROGRESS NOTES
Patient presents for microderm abrasion. Tolerated procedure well with minimal erythema. Follow-up 3-4 weeks PRN.

## 2024-03-21 ENCOUNTER — OFFICE VISIT (OUTPATIENT)
Dept: FAMILY MEDICINE CLINIC | Facility: CLINIC | Age: 74
End: 2024-03-21

## 2024-03-21 DIAGNOSIS — R23.8 INTRINSIC AGING OF FACIAL SKIN: Primary | ICD-10-CM

## 2024-03-30 DIAGNOSIS — K21.9 GASTROESOPHAGEAL REFLUX DISEASE WITHOUT ESOPHAGITIS: ICD-10-CM

## 2024-04-01 RX ORDER — PANTOPRAZOLE SODIUM 40 MG/1
40 TABLET, DELAYED RELEASE ORAL DAILY
Qty: 90 TABLET | Refills: 1 | Status: SHIPPED | OUTPATIENT
Start: 2024-04-01

## 2024-04-01 NOTE — TELEPHONE ENCOUNTER
A refill request was received for:  Requested Prescriptions     Pending Prescriptions Disp Refills    PANTOPRAZOLE 40 MG Oral Tab EC [Pharmacy Med Name: PANTOPRAZOLE SOD DR 40 MG TAB] 90 tablet 1     Sig: TAKE 1 TABLET BY MOUTH DAILY     Last refill date:  09/14/2023  Qty: 90 - 1 Refill  Dx: Reflux  Last office visit: 12/21/2023  When is follow up due: 06/21/2024 (Routine Exam)      Future Appointments   Date Time Provider Department Center   4/18/2024  2:30 PM Gail Cohen APRN [id number] [place]   6/13/2024 11:30 AM Arabella Marcos MD [facility]   8/7/2024 10:45 AM Blessing Diaz MD [facility]

## 2024-05-23 ENCOUNTER — OFFICE VISIT (OUTPATIENT)
Dept: FAMILY MEDICINE CLINIC | Facility: CLINIC | Age: 74
End: 2024-05-23

## 2024-05-23 ENCOUNTER — HOSPITAL ENCOUNTER (OUTPATIENT)
Dept: GENERAL RADIOLOGY | Age: 74
Discharge: HOME | End: 2024-05-23
Attending: NURSE PRACTITIONER

## 2024-05-23 VITALS
WEIGHT: 153.63 LBS | HEIGHT: 64 IN | BODY MASS INDEX: 26.23 KG/M2 | HEART RATE: 76 BPM | SYSTOLIC BLOOD PRESSURE: 118 MMHG | OXYGEN SATURATION: 98 % | TEMPERATURE: 98 F | DIASTOLIC BLOOD PRESSURE: 70 MMHG

## 2024-05-23 DIAGNOSIS — G47.09 OTHER INSOMNIA: ICD-10-CM

## 2024-05-23 DIAGNOSIS — G89.29 CHRONIC MIDLINE LOW BACK PAIN WITH RIGHT-SIDED SCIATICA: ICD-10-CM

## 2024-05-23 DIAGNOSIS — Z90.13 ABSENCE OF BOTH BREASTS: ICD-10-CM

## 2024-05-23 DIAGNOSIS — Z79.899 LONG-TERM USE OF HIGH-RISK MEDICATION: ICD-10-CM

## 2024-05-23 DIAGNOSIS — G89.29 CHRONIC PAIN OF RIGHT HAND: ICD-10-CM

## 2024-05-23 DIAGNOSIS — Z80.3 FAMILY HISTORY OF BREAST CANCER: ICD-10-CM

## 2024-05-23 DIAGNOSIS — N81.6 RECTOCELE: ICD-10-CM

## 2024-05-23 DIAGNOSIS — N81.11 MIDLINE CYSTOCELE: ICD-10-CM

## 2024-05-23 DIAGNOSIS — R39.15 URINARY URGENCY: ICD-10-CM

## 2024-05-23 DIAGNOSIS — E55.9 VITAMIN D DEFICIENCY: ICD-10-CM

## 2024-05-23 DIAGNOSIS — K59.01 SLOW TRANSIT CONSTIPATION: ICD-10-CM

## 2024-05-23 DIAGNOSIS — C50.112 MALIGNANT NEOPLASM OF CENTRAL PORTION OF LEFT BREAST IN FEMALE, ESTROGEN RECEPTOR POSITIVE (HCC): ICD-10-CM

## 2024-05-23 DIAGNOSIS — M79.641 CHRONIC PAIN OF RIGHT HAND: ICD-10-CM

## 2024-05-23 DIAGNOSIS — R35.1 NOCTURIA: ICD-10-CM

## 2024-05-23 DIAGNOSIS — M81.0 AGE-RELATED OSTEOPOROSIS WITHOUT CURRENT PATHOLOGICAL FRACTURE: ICD-10-CM

## 2024-05-23 DIAGNOSIS — R22.31 LOCALIZED SWELLING ON RIGHT HAND: ICD-10-CM

## 2024-05-23 DIAGNOSIS — K21.9 GASTROESOPHAGEAL REFLUX DISEASE WITHOUT ESOPHAGITIS: ICD-10-CM

## 2024-05-23 DIAGNOSIS — F41.1 GENERALIZED ANXIETY DISORDER: ICD-10-CM

## 2024-05-23 DIAGNOSIS — E78.5 DYSLIPIDEMIA: ICD-10-CM

## 2024-05-23 DIAGNOSIS — N39.3 STRESS INCONTINENCE IN FEMALE: ICD-10-CM

## 2024-05-23 DIAGNOSIS — Z00.00 ADULT GENERAL MEDICAL EXAMINATION: Primary | ICD-10-CM

## 2024-05-23 DIAGNOSIS — N81.2 UTEROVAGINAL PROLAPSE, INCOMPLETE: ICD-10-CM

## 2024-05-23 DIAGNOSIS — Z17.0 MALIGNANT NEOPLASM OF CENTRAL PORTION OF LEFT BREAST IN FEMALE, ESTROGEN RECEPTOR POSITIVE (HCC): ICD-10-CM

## 2024-05-23 DIAGNOSIS — M54.41 CHRONIC MIDLINE LOW BACK PAIN WITH RIGHT-SIDED SCIATICA: ICD-10-CM

## 2024-05-23 DIAGNOSIS — R35.89 POLYURIA: ICD-10-CM

## 2024-05-23 PROBLEM — R03.0 BORDERLINE HYPERTENSION: Status: RESOLVED | Noted: 2022-02-03 | Resolved: 2024-05-23

## 2024-05-23 PROBLEM — R94.31 ABNORMAL ECG: Status: RESOLVED | Noted: 2021-09-22 | Resolved: 2024-05-23

## 2024-05-23 PROBLEM — M85.852 OSTEOPENIA OF NECK OF LEFT FEMUR: Status: RESOLVED | Noted: 2022-09-13 | Resolved: 2024-05-23

## 2024-05-23 PROCEDURE — 74018 RADEX ABDOMEN 1 VIEW: CPT | Performed by: NURSE PRACTITIONER

## 2024-05-23 RX ORDER — ESZOPICLONE 2 MG/1
2 TABLET, FILM COATED ORAL NIGHTLY PRN
Qty: 90 TABLET | Refills: 1 | Status: SHIPPED | OUTPATIENT
Start: 2024-05-23

## 2024-05-23 NOTE — PROGRESS NOTES
Subjective:   Whitley Bhakta is a 73 year old female who presents for a MA (Medicare Advantage) Supervisit (Once per calendar year) and scheduled follow up of multiple significant but stable problems and acute uncomplicated problem.     Reports having some issues in past few months. She fell last year and broke right shoulder, had surgical repair and is now undergoing PT. Mobility is improving, but she has constant pain in right side of neck and upper arm that sometimes impair her sleep. She has also had pain and swelling of right hand since the fall, reports x-ray was negative but she cannot wear her rings due to swelling. Pain is constant, also has some limited mobility. Hx of palmar fibrosis. PT recommended an MRI.     Hx breast cancer followed by Dr. Marcos. Hx bladder prolapse with sling placement. Since her fall she has noticed an increase in daily urination and is having nocturia 3-5 times/night which is affecting her sleep. No dysuria or hematuria. +Urgency. Hx IBS, has had constipation issues for past 2.5 months. Stools are \"chris\" and do not occur daily. Does not feel she is emptying completely. She does take fiber daily. No blood in stool. Last colonoscopy was about 2 years ago.     Hx insomnia, takes Lunesta PRN with improvement. Continues to have chronic lower back pain with right-sided sciatica that was not bothering her as much when she was focused on the shoulder but is now increasing again.     Hx GERD. Having some epigastric discomfort at times. Takes Protonix daily. No nausea. This has worsened since constipation started.     History/Other:   Fall Risk Assessment:   She has been screened for Falls and is High Risk. Fall Prevention information provided to patient in After Visit Summary.    Do you feel unsteady when standing or walking?: Yes  Do you worry about falling?: Yes  Have you fallen in the past year?: Yes  How many times have you fallen?: 3  Were you injured?: Yes     Cognitive Assessment:    She had a completely normal cognitive assessment - see flowsheet entries     Functional Ability/Status:   Whitley Bhakta has some abnormal functions as listed below:  She has Hearing problems based on screening of functional status.      Depression Screening (PHQ-2/PHQ-9): PHQ-2 SCORE: 0  , done 5/23/2024     Advanced Directives:   She does have a Living Will but we do NOT have it on file in Epic.    She does have a POA but we do NOT have it on file in [facility].    Patient has Advance Care Planning documents but we do not have a copy in EMR. Discussed Advanced Care Planning with patient and instructed patient to get our office a copy to be scanned into EMR.      Patient Active Problem List   Diagnosis    Gastroesophageal reflux disease without esophagitis    Malignant neoplasm of central portion of left breast in female, estrogen receptor positive (HCC)    Family history of breast cancer    Other insomnia    Absence of both breasts    Dyslipidemia    Vitamin D deficiency    Generalized anxiety disorder    Midline cystocele    Rectocele    Stress incontinence in female    Uterovaginal prolapse, incomplete    Age-related osteoporosis without current pathological fracture     Allergies:  She has No Known Allergies.    Current Medications:  Outpatient Medications Marked as Taking for the 5/23/24 encounter (Office Visit) with Gail Cohen APRN   Medication Sig    eszopiclone 2 MG Oral Tab Take 1 tablet (2 mg total) by mouth nightly as needed. For insomnia       Medical History:  She  has a past medical history of Cancer (HCC) (9/2021), Cervical dysplasia (1993), Esophageal reflux, Hearing impairment, High cholesterol, Hormone replacement therapy (HRT) (9/1/2021), IBS (irritable bowel syndrome), Osteoarthritis, Plantar fasciitis of right foot (2021), and Visual impairment.  Surgical History:  She  has a past surgical history that includes madeline localization wire 1 site left (cpt=19281) (1997); reduction right  (2018/2019); conization cervix,knife/laser (1993); anus surgery procedure unlisted (03/24/2022); hysterectomy (2016); colonoscopy (1997 & 2016); Abdominoplasty (2004); mastectomy left (10/28/2021); mastectomy right (10/28/2021); and reduction left (2018/2019).   Family History:  Her family history includes Breast Cancer (age of onset: 54) in her sister; Breast Cancer (age of onset: 69) in her paternal grandmother; Cancer in her maternal grandmother, paternal grandmother, sister, and sister; Colon Cancer in her paternal cousin male, paternal cousin male, and paternal cousin male; Dementia in her maternal grandmother; Diabetes in her brother, father, mother, and paternal uncle; Heart Disorder in her mother; Ovarian Cancer in her maternal grandmother; Renal Disease in her father; Skin cancer (age of onset: 68) in her sister; lung cancer in her maternal aunt; lymphoma (age of onset: 22) in her niece.  Social History:  She  reports that she has never smoked. She has never used smokeless tobacco. She reports current alcohol use of about 3.0 standard drinks of alcohol per week. She reports that she does not use drugs.    Tobacco:  She has never smoked tobacco.    CAGE Alcohol Screen:   CAGE screening score of 0 on 5/23/2024, showing low risk of alcohol abuse.      Patient Care Team:  Darvin Montalvo MD as PCP - General (Family Medicine)  Loida Joyce, RN as Breast Navigator (ONCOLOGY)  Lolita Son, RN as Registered Nurse (Registered Nurse)  Kandace Paredes PTA as Physical Therapy Assistant (Physical Therapy)  Russell Mulligan MD (Cardiovascular Diseases)  Gail Cohen APRN (Nurse Practitioner Family)    Review of Systems  GENERAL: feels well otherwise  SKIN: denies any unusual skin lesions  EYES: denies blurred vision or double vision  HEENT: denies nasal congestion, sinus pain or ST  LUNGS: denies shortness of breath with exertion  CARDIOVASCULAR: denies chest pain on exertion  GI: denies abdominal pain. See  HPI.  : See HPI.   MUSCULOSKELETAL: See HPI.  NEURO: occasional headaches, not new or severe.  PSYCHE: denies depression or anxiety    Objective:   Physical Exam  General Appearance:  Alert, cooperative, no distress, appears stated age   Head:  Normocephalic, without obvious abnormality, atraumatic   Eyes:  PERRL, conjunctiva/corneas clear, EOM's intact both eyes   Ears:  Normal TM's and external ear canals, both ears   Nose: Nares normal, septum midline,mucosa normal, no drainage or sinus tenderness   Throat: Lips, mucosa, and tongue normal; teeth and gums normal   Neck: Supple, symmetrical, trachea midline, no adenopathy;  thyroid: not enlarged, symmetric, no tenderness/mass/nodules; no carotid bruit or JVD   Back:   Symmetric, no curvature, ROM normal, no CVA tenderness   Lungs:   Clear to auscultation bilaterally, respirations unlabored   Heart:  Regular rate and rhythm, S1 and S2 normal, no murmur, rub, or gallop   Abdomen:   Soft, non-tender, bowel sounds active all four quadrants,  no masses, no organomegaly   Pelvic: Deferred   Extremities: R hand appears mildly swollen in fingers. MTC joints for 2nd and 3rd digits enlarged. Nontender to palpation.   Pulses: 2+ and symmetric   Skin: Skin color, texture, turgor normal, no rashes or lesions   Lymph nodes: Cervical, supraclavicular, and axillary nodes normal   Neurologic: Normal       /70   Pulse 76   Temp 98.2 °F (36.8 °C)   Ht 5' 4\" (1.626 m)   Wt 153 lb 9.6 oz (69.7 kg)   SpO2 98%   BMI 26.37 kg/m²  Estimated body mass index is 26.37 kg/m² as calculated from the following:    Height as of this encounter: 5' 4\" (1.626 m).    Weight as of this encounter: 153 lb 9.6 oz (69.7 kg).    Medicare Hearing Assessment:   Hearing Screening    Time taken: 5/23/2024  2:07 PM  Screening Method: Questionnaire  I have a problem hearing over the telephone: No I have trouble following the conversations when two or more people are talking at the same time: No   I  have trouble understanding things on the TV: No I have to strain to understand conversations: No   I have to worry about missing the telephone ring or doorbell: No I have trouble hearing conversations in a noisy background such as a crowded room or restaurant: No   I get confused about where sounds come from: No I misunderstand some words in a sentence and need to ask people to repeat themselves: No   I especially have trouble understanding the speech of women and children: No I have trouble understanding the speaker in a large room such as at a meeting or place of Religious: No   Many people I talk to seem to mumble (or don't speak clearly): No People get annoyed because I misunderstand what they say: No   I misunderstand what others are saying and make inappropriate responses: No I avoid social activities because I cannot hear well and fear I will reply improperly: No   Family members and friends have told me they think I may have hearing loss: No             Visual Acuity:   Right Eye Visual Acuity: Corrected Right Eye Chart Acuity: 20/20   Left Eye Visual Acuity: Corrected Left Eye Chart Acuity: 20/20   Both Eyes Visual Acuity: Corrected Both Eyes Chart Acuity: 20/20   Able To Tolerate Visual Acuity: Yes        Assessment & Plan:   Whitley Bhakta is a 73 year old female who presents for a Medicare Assessment.     1. Adult general medical examination (Primary)  -     CBC With Differential With Platelet; Future; Expected date: 05/23/2024  -     Comp Metabolic Panel (14); Future; Expected date: 05/23/2024  -     Lipid Panel; Future; Expected date: 05/23/2024  -     Assay, Thyroid Stim Hormone; Future; Expected date: 05/23/2024  -     Hemoglobin A1C; Future; Expected date: 05/23/2024  -Recommended healthy diet and regular exercise.  -Annual eye exam and biannual dental visits.    2. Malignant neoplasm of central portion of left breast in female, estrogen receptor positive (HCC)  -Followed by oncology.    3.  Dyslipidemia  -Labs ordered.     4. Vitamin D deficiency  -Vitamin D ordered by other provider.    5. Rectocele  -See below.    6. Gastroesophageal reflux disease without esophagitis  -Symptoms increased recently, likely due to constipation. Will work up and treat as below. Referred to gastroenterology. Continue Protonix. Lifestyle modifications.    7. Generalized anxiety disorder  -Not currently on medication. Follow-up PRN.     8. Midline cystocele  -Repaired but now with new symptoms. Recommended pelvic floor PT, patient declines at this time due to undergoing PT for orthopedic issues. Recommended to limit caffeine and alcohol. Should avoid drinking fluids after 6 PM. Recommended urogyne follow-up, referred.     9. Stress incontinence in female  -See above.    10. Uterovaginal prolapse, incomplete  -See above.    11. Family history of breast cancer  -Followed by oncology.    12. Absence of both breasts  -See above.    13. Other insomnia  -Stable on medication, refill sent. Cannot take this with ETOH or other sedating medications or before driving.  -     Eszopiclone; Take 1 tablet (2 mg total) by mouth nightly as needed. For insomnia  Dispense: 90 tablet; Refill: 1    14. Age-related osteoporosis without current pathological fracture  -     Endocrine Referral - In Network  -Notes she never started on oral medications. Interested in injections. Will refer to endocrinology.  -Fall precautions reviewed.    15. Long-term use of high-risk medication  -     Eszopiclone; Take 1 tablet (2 mg total) by mouth nightly as needed. For insomnia  Dispense: 90 tablet; Refill: 1  -See above.    16. Slow transit constipation  -     Gastro Referral - In Network  -     XR ABDOMEN (1 VIEW) (CPT=74018); Future; Expected date: 05/23/2024  -Check KUB today.  -If this shows constipation will recommend colon cleanse.   -Follow-up with GI for persistent/worsening symptoms.    17. Urinary urgency  -     Urogynecology Referral - In  Network  -See above.    18. Nocturia  -     Urogynecology Referral - In Network  -See above.    19. Chronic pain of right hand  -     Hand & Microvascular Surgery Referral - [facility] (Dr. Mckeon)  -     z Insight MRI HAND, RIGHT (CPT=73218); Future; Expected date: 05/23/2024  -     z Insight MRI HAND, RIGHT (CPT=73218)  -Will order MRI hand.  -Recommended follow-up with hand specialist.    20. Localized swelling on right hand  -     Hand & Microvascular Surgery Referral - [facility] (Dr. Mckeon)  -     z Insight MRI HAND, RIGHT (CPT=73218); Future; Expected date: 05/23/2024  -     z Insight MRI HAND, RIGHT (CPT=73218)  -See above.    21. Chronic midline low back pain with right-sided sciatica  -     Physical Therapy Referral - External  -Added this area to current PT.    22. Polyuria  -     Hemoglobin A1C; Future; Expected date: 05/23/2024  -     Urogynecology Referral - In Network  -Will check A1C to r/o diabetes.     The patient indicates understanding of these issues and agrees to the plan.  Reinforced healthy diet, lifestyle, and exercise.      Return in about 6 months (around 11/23/2024).     Gail Cohen, APRN, 5/23/2024     Supplementary Documentation:   General Health:  In the past six months, have you lost more than 10 pounds without trying?: 2 - No  Has your appetite been poor?: No  Type of Diet: Balanced  How does the patient maintain a good energy level?: Appropriate Exercise  How would you describe your daily physical activity?: Light  How would you describe your current health state?: Good  How do you maintain positive mental well-being?: Visiting Friends;Visiting Family  On a scale of 0 to 10, with 0 being no pain and 10 being severe pain, what is your pain level?: 4 - (Moderate)  In the past six months, have you experienced urine leakage?: 1-Yes  At any time do you feel concerned for the safety/well-being of yourself and/or your children, in your home or elsewhere?:  No  Have you had any immunizations at another office such as Influenza, Hepatitis B, Tetanus, or Pneumococcal?: No       Whitley Bhakta's SCREENING SCHEDULE   Tests on this list are recommended by your physician but may not be covered, or covered at this frequency, by your insurer.   Please check with your insurance carrier before scheduling to verify coverage.   PREVENTATIVE SERVICES FREQUENCY &  COVERAGE DETAILS LAST COMPLETION DATE   Diabetes Screening    Fasting Blood Sugar /  Glucose    One screening every 12 months if never tested or if previously tested but not diagnosed with pre-diabetes   One screening every 6 months if diagnosed with pre-diabetes Lab Results   Component Value Date    GLU 98 12/18/2023        Cardiovascular Disease Screening    Lipid Panel  Cholesterol  Lipoprotein (HDL)  Triglycerides Covered every 5 years for all Medicare beneficiaries without apparent signs or symptoms of cardiovascular disease Lab Results   Component Value Date    CHOLEST 185 02/15/2023    HDL 52 02/15/2023     (H) 02/15/2023    TRIG 178 (H) 02/15/2023         Electrocardiogram (EKG)   Covered if needed at Welcome to Medicare, and non-screening if indicated for medical reasons 12/18/2023      Ultrasound Screening for Abdominal Aortic Aneurysm (AAA) Covered once in a lifetime for one of the following risk factors    Men who are 65-75 years old and have ever smoked    Anyone with a family history -     Colorectal Cancer Screening  Covered for ages 50-85; only need ONE of the following:    Colonoscopy   Covered every 10 years    Covered every 2 years if patient is at high risk or previous colonoscopy was abnormal 02/15/2022    Health Maintenance   Topic Date Due    Colorectal Cancer Screening  02/15/2032       Flexible Sigmoidoscopy   Covered every 4 years -    Fecal Occult Blood Test Covered annually -   Bone Density Screening    Bone density screening    Covered every 2 years after age 65 if diagnosed with risk  of osteoporosis or estrogen deficiency.    Covered yearly for long-term glucocorticoid medication use (Steroids) Last Dexa Scan:    XR DEXA BONE DENSITOMETRY (CPT=77080) 10/10/2023      No recommendations at this time   Pap and Pelvic    Pap   Covered every 2 years for women at normal risk; Annually if at high risk -  No recommendations at this time    Chlamydia Annually if high risk -  No recommendations at this time   Screening Mammogram    Mammogram     Recommend annually for all female patients aged 40 and older    One baseline mammogram covered for patients aged 35-39 09/29/2021    Health Maintenance   Topic Date Due    Mammogram  Discontinued       Immunizations    Influenza Covered once per flu season  Please get every year 11/02/2023  No recommendations at this time    Pneumococcal Each vaccine ([id number] & [id number]) covered once after 65 Prevnar 13: -    [id number]: -     No recommendations at this time    Hepatitis B One screening covered for patients with certain risk factors   -  No recommendations at this time    Tetanus Toxoid Not covered by Medicare Part B unless medically necessary (cut with metal); may be covered with your pharmacy prescription benefits -    Tetanus, Diptheria and Pertusis TD and TDaP Not covered by Medicare Part B -  No recommendations at this time    Zoster Not covered by Medicare Part B; may be covered with your pharmacy  prescription benefits -  No recommendations at this time

## 2024-05-30 ENCOUNTER — LAB ENCOUNTER (OUTPATIENT)
Dept: LAB | Age: 74
End: 2024-05-30
Attending: NURSE PRACTITIONER
Payer: COMMERCIAL

## 2024-05-30 DIAGNOSIS — Z00.00 ADULT GENERAL MEDICAL EXAMINATION: ICD-10-CM

## 2024-05-30 DIAGNOSIS — E55.9 VITAMIN D DEFICIENCY: ICD-10-CM

## 2024-05-30 DIAGNOSIS — R35.89 POLYURIA: ICD-10-CM

## 2024-05-30 LAB
BASOPHILS # BLD AUTO: 0.03 X10(3) UL (ref 0–0.2)
BASOPHILS NFR BLD AUTO: 0.9 %
DEPRECATED RDW RBC AUTO: 40.7 FL (ref 35.1–46.3)
EOSINOPHIL # BLD AUTO: 0.11 X10(3) UL (ref 0–0.7)
EOSINOPHIL NFR BLD AUTO: 3.3 %
ERYTHROCYTE [DISTWIDTH] IN BLOOD BY AUTOMATED COUNT: 12 % (ref 11–15)
EST. AVERAGE GLUCOSE BLD GHB EST-MCNC: 108 MG/DL (ref 68–126)
HBA1C MFR BLD: 5.4 % (ref ?–5.7)
HCT VFR BLD AUTO: 37 %
HGB BLD-MCNC: 12.5 G/DL
IMM GRANULOCYTES # BLD AUTO: 0 X10(3) UL (ref 0–1)
IMM GRANULOCYTES NFR BLD: 0 %
LYMPHOCYTES # BLD AUTO: 1.31 X10(3) UL (ref 1–4)
LYMPHOCYTES NFR BLD AUTO: 39.1 %
MCH RBC QN AUTO: 31.2 PG (ref 26–34)
MCHC RBC AUTO-ENTMCNC: 33.8 G/DL (ref 31–37)
MCV RBC AUTO: 92.3 FL
MONOCYTES # BLD AUTO: 0.24 X10(3) UL (ref 0.1–1)
MONOCYTES NFR BLD AUTO: 7.2 %
NEUTROPHILS # BLD AUTO: 1.66 X10 (3) UL (ref 1.5–7.7)
NEUTROPHILS # BLD AUTO: 1.66 X10(3) UL (ref 1.5–7.7)
NEUTROPHILS NFR BLD AUTO: 49.5 %
PLATELET # BLD AUTO: 262 10(3)UL (ref 150–450)
RBC # BLD AUTO: 4.01 X10(6)UL
WBC # BLD AUTO: 3.4 X10(3) UL (ref 4–11)

## 2024-05-30 PROCEDURE — 80061 LIPID PANEL: CPT | Performed by: NURSE PRACTITIONER

## 2024-05-30 PROCEDURE — 83036 HEMOGLOBIN GLYCOSYLATED A1C: CPT | Performed by: NURSE PRACTITIONER

## 2024-05-30 PROCEDURE — 80050 GENERAL HEALTH PANEL: CPT | Performed by: NURSE PRACTITIONER

## 2024-05-31 LAB
ALBUMIN SERPL-MCNC: 4.5 G/DL (ref 3.2–4.8)
ALBUMIN/GLOB SERPL: 1.7 {RATIO} (ref 1–2)
ALP LIVER SERPL-CCNC: 68 U/L
ALT SERPL-CCNC: 12 U/L
ANION GAP SERPL CALC-SCNC: 9 MMOL/L (ref 0–18)
AST SERPL-CCNC: 22 U/L (ref ?–34)
BILIRUB SERPL-MCNC: 0.6 MG/DL (ref 0.2–1.1)
BUN BLD-MCNC: 12 MG/DL (ref 9–23)
BUN/CREAT SERPL: 13 (ref 10–20)
CALCIUM BLD-MCNC: 10.1 MG/DL (ref 8.7–10.4)
CHLORIDE SERPL-SCNC: 111 MMOL/L (ref 98–112)
CHOLEST SERPL-MCNC: 221 MG/DL (ref ?–200)
CO2 SERPL-SCNC: 24 MMOL/L (ref 21–32)
CREAT BLD-MCNC: 0.92 MG/DL
EGFRCR SERPLBLD CKD-EPI 2021: 66 ML/MIN/1.73M2 (ref 60–?)
FASTING PATIENT LIPID ANSWER: YES
FASTING STATUS PATIENT QL REPORTED: YES
GLOBULIN PLAS-MCNC: 2.7 G/DL (ref 2–3.5)
GLUCOSE BLD-MCNC: 79 MG/DL (ref 70–99)
HDLC SERPL-MCNC: 67 MG/DL (ref 40–59)
LDLC SERPL CALC-MCNC: 134 MG/DL (ref ?–100)
NONHDLC SERPL-MCNC: 154 MG/DL (ref ?–130)
OSMOLALITY SERPL CALC.SUM OF ELEC: 297 MOSM/KG (ref 275–295)
POTASSIUM SERPL-SCNC: 4.3 MMOL/L (ref 3.5–5.1)
PROT SERPL-MCNC: 7.2 G/DL (ref 5.7–8.2)
SODIUM SERPL-SCNC: 144 MMOL/L (ref 136–145)
TRIGL SERPL-MCNC: 111 MG/DL (ref 30–149)
TSI SER-ACNC: 1.85 MIU/ML (ref 0.55–4.78)
VIT D+METAB SERPL-MCNC: 79.3 NG/ML (ref 30–100)
VLDLC SERPL CALC-MCNC: 20 MG/DL (ref 0–30)

## 2024-06-13 ENCOUNTER — OFFICE VISIT (OUTPATIENT)
Dept: HEMATOLOGY/ONCOLOGY | Facility: HOSPITAL | Age: 74
End: 2024-06-13
Attending: INTERNAL MEDICINE
Payer: COMMERCIAL

## 2024-06-13 VITALS
HEIGHT: 64 IN | DIASTOLIC BLOOD PRESSURE: 67 MMHG | HEART RATE: 60 BPM | OXYGEN SATURATION: 96 % | RESPIRATION RATE: 16 BRPM | SYSTOLIC BLOOD PRESSURE: 143 MMHG | TEMPERATURE: 98 F | BODY MASS INDEX: 26.53 KG/M2 | WEIGHT: 155.38 LBS

## 2024-06-13 DIAGNOSIS — C50.112 MALIGNANT NEOPLASM OF CENTRAL PORTION OF LEFT BREAST IN FEMALE, ESTROGEN RECEPTOR POSITIVE (HCC): Primary | ICD-10-CM

## 2024-06-13 DIAGNOSIS — Z90.13 H/O BILATERAL MASTECTOMY: ICD-10-CM

## 2024-06-13 DIAGNOSIS — M85.80 OSTEOPENIA, UNSPECIFIED LOCATION: ICD-10-CM

## 2024-06-13 DIAGNOSIS — Z08 ENCOUNTER FOR FOLLOW-UP EXAMINATION AFTER COMPLETED TREATMENT FOR MALIGNANT NEOPLASM: ICD-10-CM

## 2024-06-13 DIAGNOSIS — Z17.0 MALIGNANT NEOPLASM OF CENTRAL PORTION OF LEFT BREAST IN FEMALE, ESTROGEN RECEPTOR POSITIVE (HCC): Primary | ICD-10-CM

## 2024-06-13 DIAGNOSIS — E55.9 VITAMIN D DEFICIENCY: ICD-10-CM

## 2024-06-13 PROCEDURE — 99213 OFFICE O/P EST LOW 20 MIN: CPT | Performed by: INTERNAL MEDICINE

## 2024-06-13 NOTE — PROGRESS NOTES
HPI   Whitley Bhakta is a 73 year old female here for follow up of   Encounter Diagnoses   Name Primary?    Malignant neoplasm of central portion of left breast in female, estrogen receptor positive (HCC) Yes    H/O bilateral mastectomy     Encounter for follow-up examination after completed treatment for malignant neoplasm     Osteopenia, unspecified location     Vitamin D deficiency        Compliance with chest wall exam: Yes. Changes on chest wall exam: No.  Having surgery in January to correct axillary webbing.  However she fell in January and had a fracture in her shoulder.  Was immobilized and on PT.  Surgery has been delayed.     Compliance with exemestane:  Stopped treatment in July 2023.      Working on weight loss.  States having depression because of the R shoulder injury.      Had pain at epigastric region that went to the back, had Xray of the abdomen c/w constipation.  Only a couple of episodes.        ECOG PS 0      Review of Systems:   Review of Systems   Constitutional:  Negative for appetite change, fatigue and unexpected weight change.   Respiratory:  Negative for cough and shortness of breath.    Cardiovascular:  Negative for chest pain.   Gastrointestinal:  Negative for abdominal pain.   Musculoskeletal:  Positive for arthralgias (at hands, toes, shoulder, etc.) and back pain (LBP, chronic, not changed and intermittent.  On PT and helping.).        No bone pain   Neurological:  Positive for headaches (chronic, worse since fall with head trauma in the summer). Negative for dizziness.   Hematological:  Negative for adenopathy.   Psychiatric/Behavioral:  Positive for sleep disturbance. The patient is not nervous/anxious.          Current Outpatient Medications   Medication Sig Dispense Refill    eszopiclone 2 MG Oral Tab Take 1 tablet (2 mg total) by mouth nightly as needed. For insomnia 90 tablet 1    pantoprazole 40 MG Oral Tab EC Take 1 tablet (40 mg total) by mouth daily. 90 tablet 1     acetaminophen 500 MG Oral Tab Take 2 tablets (1,000 mg total) by mouth every 6 (six) hours.      ergocalciferol 1.25 MG (35780 UT) Oral Cap Take 1 capsule (50,000 Units total) by mouth once a week. 4 capsule 5    Tretinoin 0.1 % External Cream Apply 1 Application topically nightly. 45 g 1    omega-3 fatty acids 1000 MG Oral Cap Take 1,000 mg by mouth daily.      zinc sulfate 220 (50 Zn) MG Oral Cap Take 1 capsule (220 mg total) by mouth daily.      Ascorbic Acid (VITAMIN C) 1000 MG Oral Tab Take 1 tablet (1,000 mg total) by mouth daily.      B Complex Vitamins (VITAMIN B-COMPLEX) Oral Tab Take 1 tablet by mouth daily. 90 tablet 0    VITAMIN E OR daily.      Calcium Carbonate 600 MG Oral Tab Take 1 tablet (600 mg total) by mouth.      HYDROcodone-acetaminophen 5-325 MG Oral Tab Take 1 tablet by mouth. (Patient not taking: Reported on 6/13/2024)      ondansetron (ZOFRAN) 4 mg tablet Take 1 tablet (4 mg total) by mouth every 8 (eight) hours as needed. (Patient not taking: Reported on 6/13/2024)       Allergies:   No Known Allergies    Past Medical History:    Cancer (HCC)    Left breast stage 1a    Cervical dysplasia    Esophageal reflux    Hearing impairment    right ear hearing loss    High cholesterol    Hormone replacement therapy (HRT)    IBS (irritable bowel syndrome)    Osteoarthritis     right big toe, right middle finger    Plantar fasciitis of right foot    Visual impairment    Contacts/glasses     Past Surgical History:   Procedure Laterality Date    Abdominoplasty  2004    \"Tummy tuck\"    Anus surgery procedure unlisted  03/24/2022    Transanal Mucosectomy     Colonoscopy  1997 & 2016    Conization cervix,knife/laser  1993    Hysterectomy  2016    w/pelvic floor lift & unilateral salpingo-oophorectomy    Kevin localization wire 1 site left (cpt=19281)  1997    Mastectomy left  10/28/2021    Mastectomy right  10/28/2021    Reduction left  2018/2019    Reduction right  2018/2019     Social History      Socioeconomic History    Marital status:    Occupational History    Occupation: retired    Tobacco Use    Smoking status: Never    Smokeless tobacco: Never   Vaping Use    Vaping status: Never Used   Substance and Sexual Activity    Alcohol use: Yes     Alcohol/week: 3.0 standard drinks of alcohol     Types: 3 Glasses of wine per week    Drug use: Never   Other Topics Concern    Caffeine Concern No    Exercise No    Seat Belt No    Special Diet No    Stress Concern No    Weight Concern No       Family History   Problem Relation Age of Onset    Breast Cancer Sister 54        metastatic at time of diagnosis    Cancer Sister         Breast    Ovarian Cancer Maternal Grandmother         late 60's or early 70's, lived to age 99    Cancer Maternal Grandmother         Ovarian    Dementia Maternal Grandmother     Diabetes Mother     Heart Disorder Mother     Renal Disease Father     Diabetes Father     Breast Cancer Paternal Grandmother 69    Cancer Paternal Grandmother         Breast    Skin cancer Sister 68        not melanoma    Cancer Sister         Skin    Other (lymphoma) Niece 22    Colon Cancer Paternal Cousin Male         dx >50    Colon Cancer Paternal Cousin Male         dx >50    Colon Cancer Paternal Cousin Male         dx >50    Other (lung cancer) Maternal Aunt         dx late 50s, h/o tobacco    Diabetes Paternal Uncle     Diabetes Brother          PHYSICAL EXAM:    /67 (BP Location: Right arm, Patient Position: Sitting, Cuff Size: adult)   Pulse 60   Temp 97.5 °F (36.4 °C) (Oral)   Resp 16   Ht 1.626 m (5' 4\")   Wt 70.5 kg (155 lb 6.4 oz)   SpO2 96%   BMI 26.67 kg/m²   Wt Readings from Last 6 Encounters:   06/13/24 70.5 kg (155 lb 6.4 oz)   05/23/24 69.7 kg (153 lb 9.6 oz)   12/21/23 71 kg (156 lb 9.6 oz)   12/13/23 70.5 kg (155 lb 6.4 oz)   11/02/23 70.1 kg (154 lb 9.6 oz)   08/07/23 68 kg (150 lb)     Physical Exam   General: Patient is alert, not in acute  distress.  HEENT: EOMs intact. PERRL.   Neck: No JVD. No palpable lymphadenopathy. Neck is supple.  Chest: Clear to auscultation.  B nipple sparing mastectomies.   Heart: Regular rate and rhythm.   Abdomen: Soft, non tender with good bowel sounds.  Extremities: No edema.  Neurological: Grossly intact.   Lymphatics: There is no palpable lymphadenopathy throughout in the cervical, supraclavicular, axillary, or inguinal regions.  Psych/Depression: nl        ASSESSMENT/PLAN:     1. Malignant neoplasm of central portion of left breast in female, estrogen receptor positive (HCC)    2. H/O bilateral mastectomy    3. Encounter for follow-up examination after completed treatment for malignant neoplasm    4. Osteopenia, unspecified location    5. Vitamin D deficiency          Cancer Staging   Malignant neoplasm of central portion of left breast in female, estrogen receptor positive (HCC)  Staging form: Breast, AJCC 8th Edition  - Clinical stage from 10/6/2021: Stage IA (cT1c, cN0, cM0, G1, ER+, MS+, HER2-) - Signed by Arabella Marcos MD on 10/6/2021  - Pathologic stage from 11/10/2021: Stage IA (pT1c, pN0(sn), cM0, G1, ER+, MS+, HER2-, Oncotype DX score: 3) - Signed by Arabella Marcos MD on 11/10/2021      Whitley Bhakta is a 73 year old female with ER/MS positive breast cancer, node negative.    S/p B skin sparing and nipple sparing mastectomies     Oncotype score low risk or recurrence.  No additional benefit from adjuvant chemotherapy.      Was on exemestane to complete until November of 2026.  She stopped treatment in July of 2023 due to poor QOL.     D/w patient data published regarding compliance with adjuvant hormonal therapy that women who stopped taking hormonal therapy early were 35% to 61% more likely to have a recurrence than women who didn’t stop taking the medicine early.    She verbalized understanding of the above and declines further treatment with adjuvant endocrine therapy due to poor QOL    JERI on  10/10/2023 with worsening osteopenia.  Vitamin D level obtained consistent with vitamin D deficiency.  Vitamin D replacement therapy was sent.  She will need refills on this.  Will repeat vitamin D level in 6 months and reassess if she needs further replacement therapy.  Will have repeat in October 2025.  Calcium/vitamin D and weight bearing exercise.     All of the patient's questions were answered to the best of my abilities.       F/u in 6 months      MDM low risk    No orders of the defined types were placed in this encounter.      Results From Past 48 Hours:  No results found for this or any previous visit (from the past 48 hour(s)).  Imaging & Referrals:  None

## 2024-06-21 DIAGNOSIS — E55.9 VITAMIN D DEFICIENCY: ICD-10-CM

## 2024-06-21 RX ORDER — ERGOCALCIFEROL 1.25 MG/1
50000 CAPSULE, LIQUID FILLED ORAL WEEKLY
Qty: 4 CAPSULE | Refills: 5 | OUTPATIENT
Start: 2024-06-21

## 2024-08-05 ENCOUNTER — OFFICE VISIT (OUTPATIENT)
Dept: SURGERY | Facility: CLINIC | Age: 74
End: 2024-08-05

## 2024-08-05 DIAGNOSIS — M19.041 PRIMARY OSTEOARTHRITIS OF RIGHT HAND: Primary | ICD-10-CM

## 2024-08-05 DIAGNOSIS — M72.0 DUPUYTREN'S CONTRACTURE: ICD-10-CM

## 2024-08-05 PROCEDURE — 99244 OFF/OP CNSLTJ NEW/EST MOD 40: CPT | Performed by: PLASTIC SURGERY

## 2024-08-05 NOTE — H&P
Injury 1: R shoulder injury  - Date: 12/25/23  - Days Since: 224    Whitley Bhakta is a 73 year old female that presents with   Chief Complaint   Patient presents with    Injury     R hand injury from fall 1 year ago   .    REFERRED BY:  Gail Cohen      Pacemaker: No  Latex Allergy: no  Coumadin: No  Plavix: No  Other anticoagulants: No  Diet medication: No  Cardiac stents: No    HAND DOMINANCE:  Right    Profession: Retired    RECONSTRUCTIVE HISTORY    SUN EXPOSURE   Current no   Past yes   Sunburns yes   Tanning salons current no   Tanning salons past yes    SKIN CANCER    Personal history of skin cancer: none      HPI:       Injury 1: R shoulder injury  - Date: 12/25/23  - Days Since: 224      73-year-old female right-hand-dominant with right hand pain and stiffness, with persistent swelling    She fell and fractured her shoulder 8 months ago was treated with 3 months of a sling    She has had persistent hand stiffness and pain.  She is not sure whether she fell on her hand or her outstretched hand          Review of Systems:   Constitutional: No change in appetite, chill/rigors, or fatigue  GI: No jaundice  Endocrine: No generalized weakness  Neurological: No aphasia, loss of consciousness, or seizures    Musculoskeletal:     PAIN:  ONSET    12/25/23 following R shoulder injury, pt fell forward into corner of bed    LOCATION:  right   wrist  thumb  index finger  middle finger  ring finger  small finger    NIGHT SYMPTOMS:  No    FUNCTIONAL PROBLEMS: R hand is weak and is painful    PREVIOUS TREATMENT Has done twice weekly PT for shoulder since March 2024     Treatment helped?   PT was somewhat helpful but continues to have  R hand pa in    Curly DUP has visible cording, nodules    R hand radial pain that radiates up to her shoulder, takes tylenol as needed  Pt reports R hand stiffness and decreased flexion, weak   R hand edema    Denies N/T    MRI done June 2024  PMH:     MEDICAL  Past Medical History:     Cancer (HCC)    Left breast stage 1a    Cervical dysplasia    Esophageal reflux    Hearing impairment    right ear hearing loss    High cholesterol    Hormone replacement therapy (HRT)    IBS (irritable bowel syndrome)    Osteoarthritis     right big toe, right middle finger    Plantar fasciitis of right foot    Visual impairment    Contacts/glasses        SURGICAL  Past Surgical History:   Procedure Laterality Date    Abdominoplasty  2004    \"Tummy tuck\"    Anus surgery procedure unlisted  03/24/2022    Transanal Mucosectomy     Colonoscopy  1997 & 2016    Conization cervix,knife/laser  1993    Hysterectomy  2016    w/pelvic floor lift & unilateral salpingo-oophorectomy    Kevin localization wire 1 site left (cpt=19281)  1997    Mastectomy left  10/28/2021    Mastectomy right  10/28/2021    Reduction left  2018/2019    Reduction right  2018/2019        ALLERIGIES  No Known Allergies     MEDICATIONS  Current Outpatient Medications   Medication Sig Dispense Refill    pantoprazole 40 MG Oral Tab EC Take 1 tablet (40 mg total) by mouth daily. 90 tablet 1    ergocalciferol 1.25 MG (58846 UT) Oral Cap Take 1 capsule (50,000 Units total) by mouth once a week. 4 capsule 5    zinc sulfate 220 (50 Zn) MG Oral Cap Take 1 capsule (220 mg total) by mouth daily.      Ascorbic Acid (VITAMIN C) 1000 MG Oral Tab Take 1 tablet (1,000 mg total) by mouth daily.      B Complex Vitamins (VITAMIN B-COMPLEX) Oral Tab Take 1 tablet by mouth daily. 90 tablet 0    VITAMIN E OR daily.      Calcium Carbonate 600 MG Oral Tab Take 1 tablet (600 mg total) by mouth.      eszopiclone 2 MG Oral Tab Take 1 tablet (2 mg total) by mouth nightly as needed. For insomnia (Patient not taking: Reported on 8/5/2024) 90 tablet 1    HYDROcodone-acetaminophen 5-325 MG Oral Tab Take 1 tablet by mouth. (Patient not taking: Reported on 6/13/2024)      ondansetron (ZOFRAN) 4 mg tablet Take 1 tablet (4 mg total) by mouth every 8 (eight) hours as needed. (Patient  not taking: Reported on 6/13/2024)      Tretinoin 0.1 % External Cream Apply 1 Application topically nightly. (Patient not taking: Reported on 8/5/2024) 45 g 1    omega-3 fatty acids 1000 MG Oral Cap Take 1,000 mg by mouth daily. (Patient not taking: Reported on 8/5/2024)          SOCIAL HISTORY  Social History     Socioeconomic History    Marital status:    Occupational History    Occupation: retired    Tobacco Use    Smoking status: Never    Smokeless tobacco: Never   Vaping Use    Vaping status: Never Used   Substance and Sexual Activity    Alcohol use: Yes     Alcohol/week: 3.0 standard drinks of alcohol     Types: 3 Glasses of wine per week    Drug use: Never   Other Topics Concern    Caffeine Concern No    Exercise No    Seat Belt No    Special Diet No    Stress Concern No    Weight Concern No        FAMILY HISTORY  Family History   Problem Relation Age of Onset    Breast Cancer Sister 54        metastatic at time of diagnosis    Cancer Sister         Breast    Ovarian Cancer Maternal Grandmother         late 60's or early 70's, lived to age 99    Cancer Maternal Grandmother         Ovarian    Dementia Maternal Grandmother     Diabetes Mother     Heart Disorder Mother     Renal Disease Father     Diabetes Father     Breast Cancer Paternal Grandmother 69    Cancer Paternal Grandmother         Breast    Skin cancer Sister 68        not melanoma    Cancer Sister         Skin    Other (lymphoma) Niece 22    Colon Cancer Paternal Cousin Male         dx >50    Colon Cancer Paternal Cousin Male         dx >50    Colon Cancer Paternal Cousin Male         dx >50    Other (lung cancer) Maternal Aunt         dx late 50s, h/o tobacco    Diabetes Paternal Uncle     Diabetes Brother           PHYSICAL EXAM:     CONSTITUTIONAL: Overall appearance - Normal  HEENT: Normocephalic  EYES: Conjunctiva - Right: Normal, Left: Normal; EOMI  EARS: Inspection - Right: Normal, Left: Normal  NECK/THYROID:  Inspection - Normal, Palpation - Normal, Thyroid gland - Normal, No adenopathy  RESPIRATORY: Inspection - Normal, Effort - Normal  CARDIOVASCULAR: Regular rhythm, No murmurs  ABDOMEN: Inspection - Normal, No abdominal tenderness  NEURO: Memory intact  PSYCH: Oriented to person, place, time, and situation, Appropriate mood and affect      Hand Physical Exam:     Right hand: Diffusely edematous  Diminished flexion  Pain with passive flexion    CMC thumb dorsal subluxation  No dorsal or volar tenderness  Grind/crepitation negative  Traction pressure slightly positive    Finkelstein positive, adduction test positive, versus compartment nontender    TFCC nontender  No pain with supination or pronation against resistance    RMF/RRF pretendinous nodules and cords  RRF MP -10  R MF MP 0  Others +10    Right thumb index web 2 cm cord    LMF/L SF pretendinous nodules and cords    MP is 0 compared to +10    Thumb index web 2 cm cord    MRI independently interpreted: CMC arthritis of thumb  TFCC tear      IMPRESSION:      Arthritis diffuse right hand with stiffness and weakness    We discussed what arthritis is, including treatment options.  Arthritis is not curable.  Treatment is designed to try to improve symptoms and function, but this is not always possible.  Arthritis may require multiple treatments over a long period of time, and symptoms may not go away completely. Surgery is sometimes necessary.  Questions were answered and the patient wishes to proceed with treatment.    The hand is also deconditioned    Start OT: Range of motion, strengthening, endurance, modalities,    TFCC tear is currently asymptomatic            DUPUYTREN'S  bilateral      We discussed what Dupuytren's is, including treatment options.  Questions were answered and the patient wishes to proceed with treatment.     The Dupuytren's  bilateral is asymptomatic / minimally symptomatic.  Surgery is not indicated at this point.  If it becomes symptomatic  (eg, pain, progression, contracture, functional problems), I would recommend palmar fasciectomy.      ASSESSMENT/PLAN:     No diagnosis found.      8/5/2024  Leon Mckeon MD        +++++++++++++++++++++++++++++++++++++++++      MEDICAL DECISION MAKING    PROBLEMS      MODERATE    (number / complexity)          Acute complicated injury    DATA         MODERATE    (amount / complexity)       MRI independently reviewed    MANAGEMENT RISK  LOW    (complications/ morbidity)       Splint/OT                  MDM LEVEL    MODERATE

## 2024-08-07 ENCOUNTER — OFFICE VISIT (OUTPATIENT)
Dept: SURGERY | Facility: CLINIC | Age: 74
End: 2024-08-07
Payer: COMMERCIAL

## 2024-08-07 VITALS
OXYGEN SATURATION: 97 % | TEMPERATURE: 98 F | BODY MASS INDEX: 27 KG/M2 | WEIGHT: 157 LBS | RESPIRATION RATE: 18 BRPM | DIASTOLIC BLOOD PRESSURE: 78 MMHG | HEART RATE: 73 BPM | SYSTOLIC BLOOD PRESSURE: 123 MMHG

## 2024-08-07 DIAGNOSIS — N63.10 MASS OF RIGHT BREAST, UNSPECIFIED QUADRANT: ICD-10-CM

## 2024-08-07 DIAGNOSIS — Z90.13 H/O BILATERAL MASTECTOMY: Primary | ICD-10-CM

## 2024-08-07 PROCEDURE — 1125F AMNT PAIN NOTED PAIN PRSNT: CPT

## 2024-08-07 PROCEDURE — 3074F SYST BP LT 130 MM HG: CPT

## 2024-08-07 PROCEDURE — 99214 OFFICE O/P EST MOD 30 MIN: CPT

## 2024-08-07 PROCEDURE — 3078F DIAST BP <80 MM HG: CPT

## 2024-08-08 NOTE — PROGRESS NOTES
Breast Surgery Surveillance Visit    Diagnosis: Left breast cancer status post bilateral nipple sparing mastectomies with left sentinel lymph node biopsy and tissue expander reconstruction on October 28, 2021.    Stage:  Cancer Staging   Malignant neoplasm of central portion of left breast in female, estrogen receptor positive (HCC)  Staging form: Breast, AJCC 8th Edition  - Clinical stage from 10/6/2021: Stage IA (cT1c, cN0, cM0, G1, ER+, MT+, HER2-) - Signed by Arabella Marcos MD on 10/6/2021  - Pathologic stage from 11/10/2021: Stage IA (pT1c, pN0(sn), cM0, G1, ER+, MT+, HER2-, Oncotype DX score: 3) - Signed by Arabella Marcos MD on 11/10/2021    Disease Status:  Surgical treatment complete    History:   This 73 year old woman presented with imaging detected left breast cancer.  The patient denies any palpable masses, nipple discharge, skin changes or axillary symptoms.  She does have a family history of breast and ovarian cancer.  She has a remote history of a left breast excisional biopsy in 1994 that was reportedly benign.  She presented on September 15, 2021 for screening mammogram that demonstrated a mass in the upper outer quadrant of the left breast which additional imaging was recommended.  A left diagnostic evaluation on September 24, 2021 confirmed a 1.3 cm mass in the upper central left breast with normal-looking axillary lymph nodes.  Ultrasound-guided biopsy took place on September 29, 2021 and confirmed invasive ductal carcinoma, grade 2, ER 90%, MT 90%, HER-2/jono negative, Ki-67 9%.  She elected for bilateral mastectomies for management.  She has healed well since her implant exchange surgery.  She stopped her exemestane due to side effects.  She is here today for evaluation and recommendations for further therapy.        Past Medical History:    Cancer (HCC)    Left breast stage 1a    Cervical dysplasia    Esophageal reflux    Hearing impairment    right ear hearing loss    High cholesterol     Hormone replacement therapy (HRT)    IBS (irritable bowel syndrome)    Osteoarthritis     right big toe, right middle finger    Plantar fasciitis of right foot    Visual impairment    Contacts/glasses       Past Surgical History:   Procedure Laterality Date    Abdominoplasty      \"Tummy tuck\"    Anus surgery procedure unlisted  2022    Transanal Mucosectomy     Colonoscopy   &     Conization cervix,knife/laser      Hysterectomy  2016    w/pelvic floor lift & unilateral salpingo-oophorectomy    Kevin localization wire 1 site left (cpt=19281)      Mastectomy left  10/28/2021    Mastectomy right  10/28/2021    Reduction left      Reduction right         Gynecological History:  Pt is a   Pt was 20 years old at time of first pregnancy.    She has cumulative breastfeeding history of 4 months, last unknown.  She achieved menarche at age 15   Age of Menopause: 56  Type: unknown  She has history of hormone replacement therapy for 12 years, currently using.  She has history of oral contraceptive use for unknown years, last unknown years ago.  She denies infertility treatment to achieve pregnancy.    Medications:     eszopiclone 2 MG Oral Tab Take 1 tablet (2 mg total) by mouth nightly as needed. For insomnia 90 tablet 1    pantoprazole 40 MG Oral Tab EC Take 1 tablet (40 mg total) by mouth daily. 90 tablet 1    ondansetron (ZOFRAN) 4 mg tablet Take 1 tablet (4 mg total) by mouth every 8 (eight) hours as needed.      ergocalciferol 1.25 MG (26919 UT) Oral Cap Take 1 capsule (50,000 Units total) by mouth once a week. 4 capsule 5    Tretinoin 0.1 % External Cream Apply 1 Application topically nightly. 45 g 1    omega-3 fatty acids 1000 MG Oral Cap Take 1,000 mg by mouth daily.      zinc sulfate 220 (50 Zn) MG Oral Cap Take 1 capsule (220 mg total) by mouth daily.      Ascorbic Acid (VITAMIN C) 1000 MG Oral Tab Take 1 tablet (1,000 mg total) by mouth daily.      B Complex Vitamins  (VITAMIN B-COMPLEX) Oral Tab Take 1 tablet by mouth daily. 90 tablet 0    VITAMIN E OR daily.      Calcium Carbonate 600 MG Oral Tab Take 1 tablet (600 mg total) by mouth.         Allergies:    No Known Allergies    Family History:   Family History   Problem Relation Age of Onset    Breast Cancer Sister 54        metastatic at time of diagnosis    Cancer Sister         Breast    Ovarian Cancer Maternal Grandmother         late 60's or early 70's, lived to age 99    Cancer Maternal Grandmother         Ovarian    Dementia Maternal Grandmother     Diabetes Mother     Heart Disorder Mother     Renal Disease Father     Diabetes Father     Breast Cancer Paternal Grandmother 69    Cancer Paternal Grandmother         Breast    Skin cancer Sister 68        not melanoma    Cancer Sister         Skin    Other (lymphoma) Niece 22    Colon Cancer Paternal Cousin Male         dx >50    Colon Cancer Paternal Cousin Male         dx >50    Colon Cancer Paternal Cousin Male         dx >50    Other (lung cancer) Maternal Aunt         dx late 50s, h/o tobacco    Diabetes Paternal Uncle     Diabetes Brother        She is not of Ash[employer] ancestry.    Social History:  History   Alcohol Use    3.0 standard drinks of alcohol/week    3 Glasses of wine per week         History   Smoking Status    Never   Smokeless Tobacco    Never     The patient is .  She has 3 children.  She is retired.    Review of Systems:  General:   The patient denies, fever, chills, night sweats, fatigue, generalized weakness, change in appetite or weight loss.    HEENT:     The patient denies eye irritation, cataracts, redness, glaucoma, yellowing of the eyes, change in vision or color blindness. The patient denies+ hearing loss, ringing in the ears, ear drainage, earaches, nasal congestion, nose bleeds, snoring, pain in mouth/throat, hoarseness, change in voice, facial trauma.    Respiratory:  The patient denies chronic cough, phlegm, hemoptysis,  pleurisy/chest pain, pneumonia, asthma, wheezing, difficulty in breathing with exertion, emphysema, chronic bronchitis, shortness of breath or abnormal sound when breathing.     Cardiovascular:  There is no history of chest pain, chest pressure/discomfort, palpitations, irregular heartbeat, fainting or near-fainting, difficulty breathing when lying flat, SOB/Coughing at night, swelling of the legs or chest pain while walking.    Breasts:  See history of present illness    Gastrointestinal:     There is no history of difficulty or pain with swallowing, +reflux symptoms, vomiting, dark or bloody stools, constipation, yellowing of the skin, indigestion, nausea, change in bowel habits, +diarrhea, +abdominal pain or vomiting blood.     Genitourinary:  The patient denies +frequent urination, +needing to get up at night to urinate, urinary hesitancy or retaining urine, painful urination, urinary incontinence, decreased urine stream, +blood in the urine or vaginal/penile discharge.    Skin:    The patient denies rash, itching, skin lesions, dry skin, change in skin color or change in moles.     Hematologic/Lymphatic:  The patient denies easily bruising or bleeding or persistent swollen glands or lymph nodes.     Musculoskeletal:  The patient denies muscle aches/pain, joint pain, stiff joints, neck pain, back pain or bone pain.    Neuropsychiatric:  There is no history of migraines or severe headaches, seizure/epilepsy, speech problems, coordination problems, trembling/tremors, fainting/black outs, dizziness, memory problems, loss of sensation/numbness, problems walking, weakness, tingling or burning in hands/feet. There is no history of abusive relationship, bipolar disorder, +sleep disturbance, anxiety, depression or feeling of despair.    Endocrine:    There is no history of poor/slow wound healing, weight loss/gain, fertility or hormone problems, cold intolerance, thyroid disease.     Allergic/Immunologic:  There is no  history of hives, hay fever, angioedema or anaphylaxis.    /78 (BP Location: Right arm, Patient Position: Sitting, Cuff Size: adult)   Pulse 73   Temp 98 °F (36.7 °C) (Temporal)   Resp 18   Wt 71.2 kg (157 lb)   SpO2 97%   BMI 26.95 kg/m²     Physical Exam:  The patient is an alert, oriented, well-nourished and  well-developed woman who appears her stated age. Her speech patterns and movements are normal. Her affect is appropriate.    HEENT: The head is normocephalic. The neck is supple. The thyroid is not enlarged and is without palpable masses/nodules. There are no palpable masses. The trachea is in the midline. Conjunctiva are clear, non-icteric.    Chest: The chest expands symmetrically. The lungs are clear to auscultation.    Heart: The rhythm is regular.  There are no murmurs, rubs, gallops or thrills.    Breasts:  Her breasts are surgically absent with intact implant-based reconstruction. There is no palpable adenopathy or other concerns bilaterally.    Abdomen:  The abdomen is soft, flat and non tender. The liver is not enlarged. There are no palpable masses.    Lymph Nodes:  The supraclavicular, axillary and cervical regions are free of significant lymphadenopathy.    Back: There is no vertebral column tenderness.    Skin: The skin appears normal. There are no suspicious appearing rashes or lesions.    Extremities: The extremities are without deformity, cyanosis or edema.    Impression:   Ms. Whitley Bhakta is a 73 year old woman presents status post bilateral nipple sparing mastectomies with left sentinel lymph node biopsy and  reconstruction for left  Cancer Staging   Malignant neoplasm of central portion of left breast in female, estrogen receptor positive (HCC)  Staging form: Breast, AJCC 8th Edition  - Clinical stage from 10/6/2021: Stage IA (cT1c, cN0, cM0, G1, ER+, AZ+, HER2-) - Signed by Arabella Marcos MD on 10/6/2021  - Pathologic stage from 11/10/2021: Stage IA (pT1c, pN0(sn), cM0,  G1, ER+, AZ+, HER2-, Oncotype DX score: 3) - Signed by Arabella Marcos MD on 11/10/2021    Discussion and Plan:  I had a discussion with the Patient regarding her breast exam. On exam today I found her to be healing well since her surgery with no evidence of new or recurrent disease.  She has developed some scar tissue on bilateral chest wall.  Specifically, in the right she appears to have developed an oil cyst on the right she appears to have normal-appearing scar tissue underlying her circumareolar incision. She has stopped her exemestane due to side effects.  She will see Dr. Tobin as needed for reconstructive needs.  She should follow up in 1 year for a clinical exam. She expressed that she feels the scar tissue/cyst is increasing in size on the right, so I am recommending an ultrasound for surveillance. I encouraged her to continue monitoring her ROM and strength and explained that a referral to physical therapy may be warranted in the future if she identifies any limitations or restrictions. She was encouraged to contact the office with any questions or concerns prior to her next scheduled appointment.

## 2024-08-14 ENCOUNTER — OFFICE VISIT (OUTPATIENT)
Dept: SURGERY | Facility: CLINIC | Age: 74
End: 2024-08-14

## 2024-08-14 DIAGNOSIS — M62.81 DISTAL MUSCLE WEAKNESS: ICD-10-CM

## 2024-08-14 DIAGNOSIS — M25.641 JOINT STIFFNESS OF HAND, RIGHT: ICD-10-CM

## 2024-08-14 DIAGNOSIS — M79.601 PAIN OF RIGHT UPPER EXTREMITY: Primary | ICD-10-CM

## 2024-08-14 PROCEDURE — 97110 THERAPEUTIC EXERCISES: CPT | Performed by: OCCUPATIONAL THERAPIST

## 2024-08-14 PROCEDURE — 97165 OT EVAL LOW COMPLEX 30 MIN: CPT | Performed by: OCCUPATIONAL THERAPIST

## 2024-08-14 PROCEDURE — 97018 PARAFFIN BATH THERAPY: CPT | Performed by: OCCUPATIONAL THERAPIST

## 2024-08-14 NOTE — PROGRESS NOTES
OCCUPATIONAL THERAPY EVALUATION:   Whitley Bhakta   [id number]       SUBJECTIVE:    HX of Injury: Right hand stiffness and weakness.  Chief Complaint:  As noted above..    Precautions: None  Premorbid Functional Status: Independent w/ driving / sitting, Independent w/ ADL's  Current Level of Function: As noted above.  Employment: Student  Hand Dominance: right  Living Situation: w/ spouse  Barriers to Learning: None  Patient Goals: Full use of the right hand.    Imaging/Tests: N/A        OBJECTIVE DATA:   PAIN:   Rating (1/10): 1/10 at rest, 3/10 with activity  Location:     OBSERVATION:   Guarding movements    ORTHOTICS:    N/A      SENSORY:  Intact      ASSESSMENT & PLAN OF CARE:    Treatment Provided: Patient was seen for an initial evaluation: Paraffin bath to the right hand: Paraffin bath x 10 minutes to the involved hand, to increase joint mobility, for increased ease of motion, as well as reduction of pain, so as to increase the benefit of therapeutic exercises and strengthening.: Provided patient with information to obtain a home paraffin unit. HEP:  AROM, Tendon glides, x 20 reps per set, x 5 sets daily.. Written handout was provided to reinforce today's treatment and educational session.       Rehabilitation Potential: Good    CLINICAL ASSESSMENT:    Patient/Caregiver Education Provided: Yes    Treatment Plan:  Therapeutic Exercise  Therapeutic Activities  Modalities  Patient/Family Education    GOALS:  Short term goals to be reached in x 1 session:    1) Independent with HEP..      2) Independent with the use of a home paraffin unit..        Patient was seen 1 x /week for 1 weeks or a total of 1 visits.   Pt. was advised regarding the findings of this evaluation and agrees to the plan of care.       ( To call with questions and or concerns. )    CRESENCIO MerlosL

## 2024-08-20 ENCOUNTER — HOSPITAL ENCOUNTER (OUTPATIENT)
Dept: ULTRASOUND IMAGING | Facility: HOSPITAL | Age: 74
Discharge: HOME | End: 2024-08-20
Payer: COMMERCIAL

## 2024-08-20 DIAGNOSIS — Z90.13 H/O BILATERAL MASTECTOMY: ICD-10-CM

## 2024-08-20 DIAGNOSIS — N63.10 MASS OF RIGHT BREAST, UNSPECIFIED QUADRANT: ICD-10-CM

## 2024-08-20 PROCEDURE — 76642 ULTRASOUND BREAST LIMITED: CPT

## 2024-09-03 DIAGNOSIS — F41.1 GENERALIZED ANXIETY DISORDER: ICD-10-CM

## 2024-09-03 RX ORDER — VENLAFAXINE HYDROCHLORIDE 37.5 MG/1
37.5 CAPSULE, EXTENDED RELEASE ORAL DAILY
Qty: 90 CAPSULE | Refills: 3 | Status: SHIPPED | OUTPATIENT
Start: 2024-09-03

## 2024-09-11 ENCOUNTER — OFFICE VISIT (OUTPATIENT)
Dept: ORTHOPEDICS CLINIC | Facility: CLINIC | Age: 74
End: 2024-09-11

## 2024-09-11 ENCOUNTER — HOSPITAL ENCOUNTER (OUTPATIENT)
Dept: GENERAL RADIOLOGY | Facility: HOSPITAL | Age: 74
Discharge: HOME | End: 2024-09-11
Attending: ORTHOPAEDIC SURGERY
Payer: COMMERCIAL

## 2024-09-11 VITALS — SYSTOLIC BLOOD PRESSURE: 125 MMHG | DIASTOLIC BLOOD PRESSURE: 81 MMHG | HEART RATE: 73 BPM

## 2024-09-11 DIAGNOSIS — M25.511 RIGHT SHOULDER PAIN, UNSPECIFIED CHRONICITY: ICD-10-CM

## 2024-09-11 DIAGNOSIS — S42.291S OTHER CLOSED DISPLACED FRACTURE OF PROXIMAL END OF RIGHT HUMERUS, SEQUELA: ICD-10-CM

## 2024-09-11 DIAGNOSIS — M75.81 ROTATOR CUFF TENDINITIS, RIGHT: Primary | ICD-10-CM

## 2024-09-11 PROCEDURE — 99244 OFF/OP CNSLTJ NEW/EST MOD 40: CPT | Performed by: ORTHOPAEDIC SURGERY

## 2024-09-11 PROCEDURE — 3074F SYST BP LT 130 MM HG: CPT | Performed by: ORTHOPAEDIC SURGERY

## 2024-09-11 PROCEDURE — 3079F DIAST BP 80-89 MM HG: CPT | Performed by: ORTHOPAEDIC SURGERY

## 2024-09-11 PROCEDURE — 20610 DRAIN/INJ JOINT/BURSA W/O US: CPT | Performed by: ORTHOPAEDIC SURGERY

## 2024-09-11 PROCEDURE — 73030 X-RAY EXAM OF SHOULDER: CPT | Performed by: ORTHOPAEDIC SURGERY

## 2024-09-11 RX ORDER — TRIAMCINOLONE ACETONIDE 40 MG/ML
40 INJECTION, SUSPENSION INTRA-ARTICULAR; INTRAMUSCULAR ONCE
Status: COMPLETED | OUTPATIENT
Start: 2024-09-11 | End: 2024-09-11

## 2024-09-11 RX ADMIN — TRIAMCINOLONE ACETONIDE 40 MG: 40 INJECTION, SUSPENSION INTRA-ARTICULAR; INTRAMUSCULAR at 14:51:00

## 2024-09-11 NOTE — PROGRESS NOTES
Per verbal order from Dr. Corbin draw up 3ml of 0.5% Marcaine & 2ml 1% lidocaine and 1ml of Kenalog 40 for cortisone injection to right shoulder.  Leslie LEWIS MA    Patient provided education handout for cortisone injection.

## 2024-09-11 NOTE — PROGRESS NOTES
NURSING INTAKE COMMENTS:   Chief Complaint   Patient presents with    Fracture     Consult here for second opinion for right shoulder fracture, pain 4/10. Dec 25 th 2023 had a fracture. She follow protocol to heal shoulder.       HPI: This 73 year old female presents today with complaints of right shoulder pain.  She had injury to the right shoulder about 9 months ago.  She had a fall at home on [address].  She eventually had x-rays which showed a fracture.  She wore sling for about 3 months.  She did physical therapy for a number of months.  She finished physical therapy.  Her function improved.  Her pain improved.  She continues to have some soreness and discomfort with certain motions.  She is right-hand dominant.  She enjoys exercising.  She takes Tylenol.  She has had some swelling and pain in her hand which was attributed to arthritis recently.  She now has mild pain with overhead positioning of the arm.  She has some discomfort when sleeping at night.  She feels no significant clicking or popping.  She does have some pain along the trapezial area as well as over the lateral shoulder and arm.  She has occasional neck pain.    Past Medical History:    Cancer (HCC)    Left breast stage 1a    Cervical dysplasia    Esophageal reflux    Hearing impairment    right ear hearing loss    High cholesterol    Hormone replacement therapy (HRT)    IBS (irritable bowel syndrome)    Osteoarthritis     right big toe, right middle finger    Plantar fasciitis of right foot    Visual impairment    Contacts/glasses     Past Surgical History:   Procedure Laterality Date    Abdominoplasty  2004    \"Tummy tuck\"    Anus surgery procedure unlisted  03/24/2022    Transanal Mucosectomy     Colonoscopy  1997 & 2016    Conization cervix,knife/laser  1993    Hysterectomy  2016    w/pelvic floor lift & unilateral salpingo-oophorectomy    Kevin localization wire 1 site left (cpt=19281)  1997    Mastectomy left  10/28/2021    Mastectomy right   10/28/2021    Reduction left  2018/2019    Reduction right  2018/2019     Current Outpatient Medications   Medication Sig Dispense Refill    VENLAFAXINE ER 37.5 MG Oral Capsule SR 24 Hr TAKE ONE CAPSULE BY MOUTH DAILY 90 capsule 3    eszopiclone 2 MG Oral Tab Take 1 tablet (2 mg total) by mouth nightly as needed. For insomnia 90 tablet 1    pantoprazole 40 MG Oral Tab EC Take 1 tablet (40 mg total) by mouth daily. 90 tablet 1    ergocalciferol 1.25 MG (65270 UT) Oral Cap Take 1 capsule (50,000 Units total) by mouth once a week. 4 capsule 5    Tretinoin 0.1 % External Cream Apply 1 Application topically nightly. 45 g 1    omega-3 fatty acids 1000 MG Oral Cap Take 1,000 mg by mouth daily.      zinc sulfate 220 (50 Zn) MG Oral Cap Take 1 capsule (220 mg total) by mouth daily.      Ascorbic Acid (VITAMIN C) 1000 MG Oral Tab Take 1 tablet (1,000 mg total) by mouth daily.      B Complex Vitamins (VITAMIN B-COMPLEX) Oral Tab Take 1 tablet by mouth daily. 90 tablet 0    VITAMIN E OR daily.      Calcium Carbonate 600 MG Oral Tab Take 1 tablet (600 mg total) by mouth.      ondansetron (ZOFRAN) 4 mg tablet Take 1 tablet (4 mg total) by mouth every 8 (eight) hours as needed.       No Known Allergies  Family History   Problem Relation Age of Onset    Breast Cancer Sister 54        metastatic at time of diagnosis    Cancer Sister         Breast    Ovarian Cancer Maternal Grandmother         late 60's or early 70's, lived to age 99    Cancer Maternal Grandmother         Ovarian    Dementia Maternal Grandmother     Diabetes Mother     Heart Disorder Mother     Renal Disease Father     Diabetes Father     Breast Cancer Paternal Grandmother 69    Cancer Paternal Grandmother         Breast    Skin cancer Sister 68        not melanoma    Cancer Sister         Skin    Other (lymphoma) Niece 22    Colon Cancer Paternal Cousin Male         dx >50    Colon Cancer Paternal Cousin Male         dx >50    Colon Cancer Paternal Cousin Male          dx >50    Other (lung cancer) Maternal Aunt         dx late 50s, h/o tobacco    Diabetes Paternal Uncle     Diabetes Brother        Social History     Occupational History    Occupation: retired    Tobacco Use    Smoking status: Never    Smokeless tobacco: Never   Vaping Use    Vaping status: Never Used   Substance and Sexual Activity    Alcohol use: Yes     Alcohol/week: 3.0 standard drinks of alcohol     Types: 3 Glasses of wine per week    Drug use: Never    Sexual activity: Not on file        Review of Systems:  GENERAL: denies fevers, chills, night sweats, fatigue, unintentional weight loss/gain  SKIN: denies skin lesions, open sores, rash  HEENT:denies recent vision change, new nasal congestion,hearing loss, tinnitus, sore throat, headaches  RESPIRATORY: denies new shortness of breath, cough, asthma, wheezing  CARDIOVASCULAR: denies chest pain, leg cramps with exertion, palpitations, leg swelling  GI: denies abdominal pain, nausea, vomiting, diarrhea, constipation, hematochezia, worsening heartburn or stomach ulcers  : denies dysuria, hematuria, incontinence, increased frequency, urgency, difficulty urinating  MUSCULOSKELETAL: denies musculoskeletal complaints other than in HPI  NEURO: denies numbness, tingling, weakness, balance issues, dizziness, memory loss  PSYCHIATRIC: denies Hx of depression, anxiety, other psychiatric disorders  HEMATOLOGIC: denies blood clots, anemia, blood clotting disorders, blood transfusion  ENDOCRINE: denies autoimmune disease, thyroid issues, or diabetes  ALLERGY: denies asthma, seasonal allergies    Physical Examination:    /81   Pulse 73   Constitutional: appears well hydrated, alert and responsive, no acute distress noted  Extremities: Cervical spine range of motion full.  No exacerbation of symptoms with motion.  Spurling sign produces neck pain but no upper extremity pain.    Examination of the right shoulder reveals tenderness over the  supraclavicular fossa and over the first rib.  She is also tender over the lateral shoulder and anterior shoulder biceps tendon area.  Active forward flexion is to 140 degrees.  Active external rotation 60 degrees.  Passive internal rotation 80 degrees with mild pain.  Abduction external rotation motor strength 5 out of 5.  Belly press 5-5.  Hampton impingement sign is positive.  Speed's Test mildly positive.  Crank sign negative.  Neurological: Right hand light touch and pinprick sensory exam normal. Lateral shoulder light touch and pinprick sensory examination normal.  strength,wrist extension, biceps, triceps, and shoulder abduction strength 5 out of 5. Pancho sign negative. No obvious atrophy of the hand.        Imaging:   US BREAST RIGHT LIMITED (CPT=76642)    Result Date: 8/20/2024  PROCEDURE: US BREAST RIGHT LIMITED (CPT=76642)  COMPARISON: [facility], US BREAST LEFT LIMITED  (CPT=76642), 9/24/2021, 1:58 PM.  INDICATIONS: Mass of right breast, H/O bilateral mastectomy  TECHNIQUE:  Breast ultrasound was performed with evaluation only on specific areas of concern.   FINDINGS:   At 03:30, 8 centimeters from nipple, there is an anechoic cyst measuring 0.3 x 0.3 x 0.3 centimeters.  There is no internal vascularity.  These findings are consistent with a benign cyst.  At 03:30, 2 centimeters from nipple, there is an anechoic cyst measuring 0.3 x 0.4 x 0.3 centimeters.  There is no internal vascularity.  These findings are consistent with a benign cyst.  At 03:30, 2 centimeters from nipple, there is an anechoic cyst measuring 0.4 x 0.3 x 0.5 centimeters.  There is no internal vascularity.  This is probably benign. Short term 6 month interval follow up is recommended to ensure stability.          CONCLUSION:   Short term 6 month interval follow up is recommended to ensure stability of the probably benign mass at 3:30, 2 cm from the nipple in the right breast.  BI-RADS CATEGORY:   DIAGNOSTIC CATEGORY 3--PROBABLY BENIGN FINDING.   RECOMMENDATIONS:  SHORT TERM FOLLOW-UP ULTRASOUND RIGHT BREAST IN 6 MONTHS.         Dictated by (CST): Cyndee Amaya DO on 8/20/2024 at 8:38 AM     Finalized by (CST): Cyndee Amaya DO on 8/20/2024 at 8:46 AM          X-rays the right shoulder obtained in the office today show evidence of a prior proximal humerus fracture with complete bony union.  There is slight valgus alignment at the surgical neck.  The greater tuberosity appears slightly posteriorly displaced.       Labs:  Lab Results   Component Value Date    WBC 3.4 (L) 05/30/2024    HGB 12.5 05/30/2024    .0 05/30/2024      Lab Results   Component Value Date    GLU 79 05/30/2024    BUN 12 05/30/2024    CREATSERUM 0.92 05/30/2024    GFRNAA 62 05/10/2022    GFRAA 71 05/10/2022        Assessment and Plan:  Diagnoses and all orders for this visit:    Rotator cuff tendinitis, right  -     arthrocentesis major joint  -     triamcinolone acetonide (Kenalog-40) 40 MG/ML injection 40 mg    Right shoulder pain, unspecified chronicity  -     XR SHOULDER, COMPLETE (MIN 2 VIEWS), RIGHT (CPT=73030); Future    Other closed displaced fracture of proximal end of right humerus, sequela        Assessment: Right proximal numerous three-part fracture malunion    Plan: I feel her current humeral alignment is very acceptable.  I would expect gradual improvement in her symptoms over time.  A portion of her symptoms relate to ongoing capsular tightness and rotator cuff tendinopathy.  I injected the subacromial space with 40 mg of Kenalog using a lateral approach.  Advised continued stretching and strengthening type exercises.  Avoid repetitive overhead positioning of the arm.  Consider further imaging such as an MRI if symptoms persist.  Follow-up with me again in 4 to 6 weeks as needed.    Follow Up: Return in about 6 weeks (around 10/23/2024).    ELADIA DOE MD

## 2024-10-17 ENCOUNTER — PATIENT MESSAGE (OUTPATIENT)
Dept: ADMINISTRATIVE | Age: 74
End: 2024-10-17

## 2024-10-17 DIAGNOSIS — K21.9 GASTROESOPHAGEAL REFLUX DISEASE WITHOUT ESOPHAGITIS: ICD-10-CM

## 2024-10-21 RX ORDER — PANTOPRAZOLE SODIUM 40 MG/1
40 TABLET, DELAYED RELEASE ORAL DAILY
Qty: 90 TABLET | Refills: 1 | Status: SHIPPED | OUTPATIENT
Start: 2024-10-21

## 2024-10-21 NOTE — TELEPHONE ENCOUNTER
A refill request was received for:  Requested Prescriptions     Pending Prescriptions Disp Refills    pantoprazole 40 MG Oral Tab EC 90 tablet 1     Sig: Take 1 tablet (40 mg total) by mouth daily.     Last refill date:  4/1/2024  Qty: 90 - 1 refill  Dx: Reflux  Last office visit: 5/23/2024  When is follow up due: 11/23/2024      Future Appointments   Date Time Provider Department Center   12/16/2024  1:30 PM Arabella Marcos MD [facility] HEM [facility]   8/6/2025 12:45 PM Blessing Diaz MD [facility]

## 2024-12-10 ENCOUNTER — TELEPHONE (OUTPATIENT)
Dept: HEMATOLOGY/ONCOLOGY | Facility: HOSPITAL | Age: 74
End: 2024-12-10

## 2024-12-16 ENCOUNTER — APPOINTMENT (OUTPATIENT)
Dept: HEMATOLOGY/ONCOLOGY | Facility: HOSPITAL | Age: 74
End: 2024-12-16
Attending: INTERNAL MEDICINE
Payer: COMMERCIAL

## 2025-02-05 ENCOUNTER — TELEPHONE (OUTPATIENT)
Dept: FAMILY MEDICINE CLINIC | Facility: CLINIC | Age: 75
End: 2025-02-05

## 2025-02-05 ENCOUNTER — OFFICE VISIT (OUTPATIENT)
Dept: FAMILY MEDICINE CLINIC | Facility: CLINIC | Age: 75
End: 2025-02-05
Payer: MEDICARE

## 2025-02-05 ENCOUNTER — OFFICE VISIT (OUTPATIENT)
Age: 75
End: 2025-02-05
Attending: INTERNAL MEDICINE
Payer: COMMERCIAL

## 2025-02-05 ENCOUNTER — PATIENT MESSAGE (OUTPATIENT)
Dept: FAMILY MEDICINE CLINIC | Facility: CLINIC | Age: 75
End: 2025-02-05

## 2025-02-05 VITALS
OXYGEN SATURATION: 97 % | WEIGHT: 157 LBS | TEMPERATURE: 98 F | RESPIRATION RATE: 18 BRPM | HEIGHT: 64 IN | SYSTOLIC BLOOD PRESSURE: 127 MMHG | HEART RATE: 70 BPM | BODY MASS INDEX: 26.8 KG/M2 | DIASTOLIC BLOOD PRESSURE: 81 MMHG

## 2025-02-05 VITALS
WEIGHT: 157 LBS | TEMPERATURE: 98 F | SYSTOLIC BLOOD PRESSURE: 118 MMHG | OXYGEN SATURATION: 97 % | DIASTOLIC BLOOD PRESSURE: 68 MMHG | BODY MASS INDEX: 26.48 KG/M2 | HEIGHT: 64.5 IN | HEART RATE: 81 BPM

## 2025-02-05 DIAGNOSIS — Z90.13 H/O BILATERAL MASTECTOMY: ICD-10-CM

## 2025-02-05 DIAGNOSIS — Z08 ENCOUNTER FOR FOLLOW-UP SURVEILLANCE OF BREAST CANCER: ICD-10-CM

## 2025-02-05 DIAGNOSIS — Z01.818 PREOP EXAMINATION: Primary | ICD-10-CM

## 2025-02-05 DIAGNOSIS — Z17.0 MALIGNANT NEOPLASM OF CENTRAL PORTION OF LEFT BREAST IN FEMALE, ESTROGEN RECEPTOR POSITIVE (HCC): Primary | ICD-10-CM

## 2025-02-05 DIAGNOSIS — N63.10 MASS OF RIGHT BREAST, UNSPECIFIED QUADRANT: ICD-10-CM

## 2025-02-05 DIAGNOSIS — C50.112 MALIGNANT NEOPLASM OF CENTRAL PORTION OF LEFT BREAST IN FEMALE, ESTROGEN RECEPTOR POSITIVE (HCC): Primary | ICD-10-CM

## 2025-02-05 DIAGNOSIS — R94.31 ABNORMAL EKG: ICD-10-CM

## 2025-02-05 DIAGNOSIS — Z85.3 ENCOUNTER FOR FOLLOW-UP SURVEILLANCE OF BREAST CANCER: ICD-10-CM

## 2025-02-05 DIAGNOSIS — M85.80 OSTEOPENIA, UNSPECIFIED LOCATION: ICD-10-CM

## 2025-02-05 DIAGNOSIS — N63.20 MASS OF LEFT BREAST, UNSPECIFIED QUADRANT: ICD-10-CM

## 2025-02-05 LAB
ATRIAL RATE: 67 BPM
P AXIS: 54 DEGREES
P-R INTERVAL: 176 MS
Q-T INTERVAL: 430 MS
QRS DURATION: 86 MS
QTC CALCULATION (BEZET): 454 MS
R AXIS: -30 DEGREES
T AXIS: 27 DEGREES
VENTRICULAR RATE: 67 BPM

## 2025-02-05 PROCEDURE — 93000 ELECTROCARDIOGRAM COMPLETE: CPT | Performed by: NURSE PRACTITIONER

## 2025-02-05 PROCEDURE — 99214 OFFICE O/P EST MOD 30 MIN: CPT | Performed by: NURSE PRACTITIONER

## 2025-02-05 NOTE — H&P
HPI:     Chief Complaint   Patient presents with    Pre-Op Exam     Reconstruction surgery from double mastectomy        Whitley Bhakta is a 74 year old female who presents for a pre-operative physical exam.  Patient is to have axillary reconstruction, to be done by Dr. Chin at [facility] on 2/24/2025.     Patient denies chest pain, dyspnea, headaches, dizziness, syncope or near-syncope, palpitations, easy fatigability. Can climb 1-2 flights of stairs without difficulty.    Pt has had previous anesthesia:  Yes.  Previous complications:  No  Hx of DVT/PE: No    See Past Medical and Surgical History and ROS for other pertinent complaints and concerns      Current Outpatient Medications   Medication Sig Dispense Refill    Tretinoin 0.1 % External Cream Apply 1 Application topically nightly. 45 g 1      Allergies: Allergies[1]   Past Medical History:    Cancer (HCC)    Left breast stage 1a    Cervical dysplasia    Esophageal reflux    Hearing impairment    right ear hearing loss    High cholesterol    Hormone replacement therapy (HRT)    IBS (irritable bowel syndrome)    Osteoarthritis     right big toe, right middle finger    Plantar fasciitis of right foot    Visual impairment    Contacts/glasses      Past Surgical History:   Procedure Laterality Date    Abdominoplasty  2004    \"Tummy tuck\"    Anus surgery procedure unlisted  03/24/2022    Transanal Mucosectomy     Colonoscopy  1997 & 2016    Conization cervix,knife/laser  1993    Hysterectomy  2016    w/pelvic floor lift & unilateral salpingo-oophorectomy    Kevin localization wire 1 site left (cpt=19281)  1997    Mastectomy left  10/28/2021    Mastectomy right  10/28/2021    Reduction left  2018/2019    Reduction right  2018/2019      Family History   Problem Relation Age of Onset    Breast Cancer Sister 54        metastatic at time of diagnosis    Cancer Sister         Breast    Ovarian Cancer Maternal Grandmother         late 60's or early 70's,  lived to age 99    Cancer Maternal Grandmother         Ovarian    Dementia Maternal Grandmother     Diabetes Mother     Heart Disorder Mother     Renal Disease Father     Diabetes Father     Breast Cancer Paternal Grandmother 69    Cancer Paternal Grandmother         Breast    Skin cancer Sister 68        not melanoma    Cancer Sister         Skin    Other (lymphoma) Niece 22    Colon Cancer Paternal Cousin Male         dx >50    Colon Cancer Paternal Cousin Male         dx >50    Colon Cancer Paternal Cousin Male         dx >50    Other (lung cancer) Maternal Aunt         dx late 50s, h/o tobacco    Diabetes Paternal Uncle     Diabetes Brother       Social History:   Social History     Socioeconomic History    Marital status:    Occupational History    Occupation: retired    Tobacco Use    Smoking status: Never     Passive exposure: Never    Smokeless tobacco: Never   Vaping Use    Vaping status: Never Used   Substance and Sexual Activity    Alcohol use: Yes     Alcohol/week: 3.0 standard drinks of alcohol     Types: 3 Glasses of wine per week    Drug use: Never   Other Topics Concern    Caffeine Concern No    Exercise No    Seat Belt No    Special Diet No    Stress Concern No    Weight Concern No     Social Drivers of Health     Food Insecurity: No Food Insecurity (2/5/2025)    NCSS - Food Insecurity     Worried About Running Out of Food in the Last Year: No     Ran Out of Food in the Last Year: No   Transportation Needs: No Transportation Needs (2/5/2025)    NCSS - Transportation     Lack of Transportation: No   Housing Stability: Not At Risk (2/5/2025)    NCSS - Housing/Utilities     Has Housing: Yes     Worried About Losing Housing: No     Unable to Get Utilities: No         REVIEW OF SYSTEMS:   Pertinent positives and negatives noted in the the HPI. Specifically:  GEN:  No fever or fatigue  HEAD:  No headaches, no dizziness  EYES:  No vision change or complaints  EARS:  No hearing loss,  no ear pain  MOUTH/THROAT:  No sore throat or dental problems, no oral lesions  HEART:  No chest pain or palpitations  LUNG:  No SOB, cough or wheeze  GI:  No abdominal pain.  No N/V/D. Has some chronic constipation r/to IBS.  :  No dysuria  MS:  No joint pain or swelling B  NEURO:  Denies numbness or tingling or weakness  PSYCH:  No mood concerns, denies SHIP  SKIN: no rash, no skin lesions or changing moles    EXAM:   /68 (BP Location: Right arm, Patient Position: Sitting, Cuff Size: adult)   Pulse 81   Temp 97.8 °F (36.6 °C) (Oral)   Ht 5' 4.5\" (1.638 m)   Wt 157 lb (71.2 kg)   SpO2 97%   BMI 26.53 kg/m²  Body mass index is 26.53 kg/m².  GENERAL: well developed, well nourished, female  in no apparent distress  SKIN: no suspicious lesions,  skin color wnl  HEENT: atraumatic, normocephalic, nose and throat are clear;dentition good, EARS: canals clear, TM wnl B  EYES:PERRLA, EOMI,conjunctiva are clear, no discharge  NECK: supple, no lymphadenopathy, no TM  LUNGS: good BS B, clear to auscultation B, no wheezing and no rhonchi B   CARDIO: RRR without murmur, NL S1 S2, no S3 S4  EXT: no edema, radial and DP pulses WNL B UE/LE  GI: NABS, soft, nodistended,no masses, no HSM or tenderness  MS: grossly NL B UE/LE, no significant joint deformities, FROM B UE/LE  PSYCH: mood and affect WNL, speech clear, mood and thought congruent  NEURO: A&O x 3, CNII-XII intact B, motor and sensory grossly intact B UE/LE, DTR's 2/4 B LE, gait normal    ASSESSMENT AND PLAN:   Whitley Bhakta is a 74 year old female who presents for a Pre-Operative Physical Exam.     Patient will have axillary reconstruction with Dr. Chin on 2/24/2025 at [facility].    Patient is in good health and has no significant history of cardiac or pulmonary conditions and is therefore at low risk for cardiac and pulmonary complications from the above surgery.    Will check:  CBC, CMP  EKG in office shows normal sinus rhythm, left axis  deviation, abnormal EKG. This represents a change when compared to EKG from 12/2023. Patient is asymptomatic.     Patient is an acceptable surgical candidate and is medically cleared for surgery as long as the above tests are within normal limits and cardiac clearance obtained.    Send/FAX results to: [phone]    Additional Assessment and Plan:  1. Preop examination  -As above.  -Needs cardiac clearance.   - ELECTROCARDIOGRAM, COMPLETE  - CBC With Differential With Platelet; Future  - Comp Metabolic Panel (14); Future    2. Abnormal EKG  -See above. Discussed that this may be a normal variant but as it was not noted on last EKG will see cardiology prior to surgery.    The patient indicates understanding of the above recommendations and agrees to the above plan.  Follow up: as above    Orders Placed This Encounter   Procedures    CBC With Differential With Platelet    Comp Metabolic Panel (14)       Meds & Refills for this Visit:  Requested Prescriptions      No prescriptions requested or ordered in this encounter       Imaging & Consults:  ELECTROCARDIOGRAM, COMPLETE    ALIZA Jacobo           [1] No Known Allergies

## 2025-02-05 NOTE — PROGRESS NOTES
HPI   Whitley Bhakta is a 74 year old female here for follow up of   Encounter Diagnoses   Name Primary?    Malignant neoplasm of central portion of left breast in female, estrogen receptor positive (HCC) Yes    Encounter for follow-up surveillance of breast cancer     H/O bilateral mastectomy     Osteopenia, unspecified location        Compliance with chest wall exam: Yes.   Changes on chest wall exam: Yes, states more areas on the R where she feels lumps that were evaluated by US in August and were cysts.  Having surgery in February to correct axillary webbing.      Compliance with exemestane:  Stopped treatment in July 2023.      Working on weight loss.  Weight stable.        ECOG PS 0      Review of Systems:   Review of Systems   Constitutional:  Negative for appetite change, fatigue and unexpected weight change.   Respiratory:  Negative for cough and shortness of breath.    Cardiovascular:  Negative for chest pain.   Gastrointestinal:  Negative for abdominal pain.        IBS acts up all the time   Musculoskeletal:  Positive for arthralgias (at hands, toes, shoulder, etc.) and back pain (LBP, chronic, not changed and intermittent).        No bone pain   Neurological:  Positive for headaches (chronic, worse since fall with head trauma in the summer). Negative for dizziness.   Hematological:  Negative for adenopathy.   Psychiatric/Behavioral:  Positive for sleep disturbance. The patient is not nervous/anxious.         Stress with moving         Current Outpatient Medications   Medication Sig Dispense Refill    pantoprazole 40 MG Oral Tab EC Take 1 tablet (40 mg total) by mouth daily. 90 tablet 1    VENLAFAXINE ER 37.5 MG Oral Capsule SR 24 Hr TAKE ONE CAPSULE BY MOUTH DAILY 90 capsule 3    eszopiclone 2 MG Oral Tab Take 1 tablet (2 mg total) by mouth nightly as needed. For insomnia 90 tablet 1    ergocalciferol 1.25 MG (37209 UT) Oral Cap Take 1 capsule (50,000 Units total) by mouth once a week. 4 capsule 5     Tretinoin 0.1 % External Cream Apply 1 Application topically nightly. 45 g 1    omega-3 fatty acids 1000 MG Oral Cap Take 1,000 mg by mouth daily.      zinc sulfate 220 (50 Zn) MG Oral Cap Take 1 capsule (220 mg total) by mouth daily.      Ascorbic Acid (VITAMIN C) 1000 MG Oral Tab Take 1 tablet (1,000 mg total) by mouth daily.      B Complex Vitamins (VITAMIN B-COMPLEX) Oral Tab Take 1 tablet by mouth daily. 90 tablet 0    VITAMIN E OR daily.      Calcium Carbonate 600 MG Oral Tab Take 1 tablet (600 mg total) by mouth.       Allergies:   No Known Allergies    Past Medical History:    Cancer (HCC)    Left breast stage 1a    Cervical dysplasia    Esophageal reflux    Hearing impairment    right ear hearing loss    High cholesterol    Hormone replacement therapy (HRT)    IBS (irritable bowel syndrome)    Osteoarthritis     right big toe, right middle finger    Plantar fasciitis of right foot    Visual impairment    Contacts/glasses     Past Surgical History:   Procedure Laterality Date    Abdominoplasty  2004    \"Tummy tuck\"    Anus surgery procedure unlisted  03/24/2022    Transanal Mucosectomy     Colonoscopy  1997 & 2016    Conization cervix,knife/laser  1993    Hysterectomy  2016    w/pelvic floor lift & unilateral salpingo-oophorectomy    Kevin localization wire 1 site left (cpt=19281)  1997    Mastectomy left  10/28/2021    Mastectomy right  10/28/2021    Reduction left  2018/2019    Reduction right  2018/2019     Social History     Socioeconomic History    Marital status:    Occupational History    Occupation: retired    Tobacco Use    Smoking status: Never    Smokeless tobacco: Never   Vaping Use    Vaping status: Never Used   Substance and Sexual Activity    Alcohol use: Yes     Alcohol/week: 3.0 standard drinks of alcohol     Types: 3 Glasses of wine per week    Drug use: Never   Other Topics Concern    Caffeine Concern No    Exercise No    Seat Belt No    Special Diet No    Stress  Concern No    Weight Concern No       Family History   Problem Relation Age of Onset    Breast Cancer Sister 54        metastatic at time of diagnosis    Cancer Sister         Breast    Ovarian Cancer Maternal Grandmother         late 60's or early 70's, lived to age 99    Cancer Maternal Grandmother         Ovarian    Dementia Maternal Grandmother     Diabetes Mother     Heart Disorder Mother     Renal Disease Father     Diabetes Father     Breast Cancer Paternal Grandmother 69    Cancer Paternal Grandmother         Breast    Skin cancer Sister 68        not melanoma    Cancer Sister         Skin    Other (lymphoma) Niece 22    Colon Cancer Paternal Cousin Male         dx >50    Colon Cancer Paternal Cousin Male         dx >50    Colon Cancer Paternal Cousin Male         dx >50    Other (lung cancer) Maternal Aunt         dx late 50s, h/o tobacco    Diabetes Paternal Uncle     Diabetes Brother          PHYSICAL EXAM:    /81 (BP Location: Right arm, Patient Position: Sitting, Cuff Size: large)   Pulse 70   Temp 97.6 °F (36.4 °C) (Tympanic)   Resp 18   Ht 1.626 m (5' 4\")   Wt 71.2 kg (157 lb)   SpO2 97%   BMI 26.95 kg/m²   Wt Readings from Last 6 Encounters:   02/05/25 71.2 kg (157 lb)   08/07/24 71.2 kg (157 lb)   06/13/24 70.5 kg (155 lb 6.4 oz)   05/23/24 69.7 kg (153 lb 9.6 oz)   12/21/23 71 kg (156 lb 9.6 oz)   12/13/23 70.5 kg (155 lb 6.4 oz)     Physical Exam   General: Patient is alert, not in acute distress.  HEENT: EOMs intact. PERRL.   Neck: No JVD. No palpable lymphadenopathy. Neck is supple.  Chest: Clear to auscultation.  B nipple sparing mastectomies.  Areas of lumpiness on both breasts, likely necrosis.  Heart: Regular rate and rhythm.   Abdomen: Soft, non tender with good bowel sounds.  Extremities: No edema.  Neurological: Grossly intact.   Lymphatics: There is no palpable lymphadenopathy throughout in the cervical, supraclavicular, axillary, or inguinal regions.  Psych/Depression:  nl        ASSESSMENT/PLAN:     1. Malignant neoplasm of central portion of left breast in female, estrogen receptor positive (HCC)    2. Encounter for follow-up surveillance of breast cancer    3. H/O bilateral mastectomy    4. Osteopenia, unspecified location          Cancer Staging   Malignant neoplasm of central portion of left breast in female, estrogen receptor positive (HCC)  Staging form: Breast, AJCC 8th Edition  - Clinical stage from 10/6/2021: Stage IA (cT1c, cN0, cM0, G1, ER+, OK+, HER2-) - Signed by Arabella Marcos MD on 10/6/2021  - Pathologic stage from 11/10/2021: Stage IA (pT1c, pN0(sn), cM0, G1, ER+, OK+, HER2-, Oncotype DX score: 3) - Signed by Arabella Marcos MD on 11/10/2021      Whitley Bhakta is a 74 year old female with ER/OK positive breast cancer, node negative.    S/p B skin sparing and nipple sparing mastectomies     Oncotype score low risk or recurrence.  No additional benefit from adjuvant chemotherapy.      Was on exemestane to complete until November of 2026.  She stopped treatment in July of 2023 due to poor QOL.     D/w patient data published regarding compliance with adjuvant hormonal therapy that women who stopped taking hormonal therapy early were 35% to 61% more likely to have a recurrence than women who didn’t stop taking the medicine early.    She verbalized understanding of the above and declines further treatment with adjuvant endocrine therapy due to poor QOL    DEXA on 10/10/2023 with worsening osteopenia.  Vitamin D level obtained consistent with vitamin D deficiency.  Vitamin D replacement therapy was sent.  She will need refills on this.  Will repeat vitamin D level in 6 months and reassess if she needs further replacement therapy.  Will have repeat in October 2025.  Calcium/vitamin D and weight bearing exercise.     B reconstruction with masses;  likely fat necrosis from fat grafting.  US of both reconstructions ordered.      All of the patient's questions were  answered to the best of my abilities.       F/u in 6 months      MDM low risk    No orders of the defined types were placed in this encounter.      Results From Past 48 Hours:  No results found for this or any previous visit (from the past 48 hours).  Imaging & Referrals:  None

## 2025-02-10 ENCOUNTER — LAB ENCOUNTER (OUTPATIENT)
Dept: LAB | Facility: HOSPITAL | Age: 75
End: 2025-02-10
Attending: NURSE PRACTITIONER
Payer: COMMERCIAL

## 2025-02-10 DIAGNOSIS — Z01.818 PREOP EXAMINATION: ICD-10-CM

## 2025-02-10 LAB
ALBUMIN SERPL-MCNC: 4.6 G/DL (ref 3.2–4.8)
ALBUMIN/GLOB SERPL: 1.7 {RATIO} (ref 1–2)
ALP LIVER SERPL-CCNC: 78 U/L
ALT SERPL-CCNC: 36 U/L
ANION GAP SERPL CALC-SCNC: 8 MMOL/L (ref 0–18)
AST SERPL-CCNC: 39 U/L (ref ?–34)
BASOPHILS # BLD AUTO: 0.04 X10(3) UL (ref 0–0.2)
BASOPHILS NFR BLD AUTO: 0.9 %
BILIRUB SERPL-MCNC: 0.7 MG/DL (ref 0.2–1.1)
BUN BLD-MCNC: 13 MG/DL (ref 9–23)
BUN/CREAT SERPL: 13.4 (ref 10–20)
CALCIUM BLD-MCNC: 9.6 MG/DL (ref 8.7–10.4)
CHLORIDE SERPL-SCNC: 104 MMOL/L (ref 98–112)
CO2 SERPL-SCNC: 28 MMOL/L (ref 21–32)
CREAT BLD-MCNC: 0.97 MG/DL
DEPRECATED RDW RBC AUTO: 39.8 FL (ref 35.1–46.3)
EGFRCR SERPLBLD CKD-EPI 2021: 62 ML/MIN/1.73M2 (ref 60–?)
EOSINOPHIL # BLD AUTO: 0.09 X10(3) UL (ref 0–0.7)
EOSINOPHIL NFR BLD AUTO: 2.1 %
ERYTHROCYTE [DISTWIDTH] IN BLOOD BY AUTOMATED COUNT: 11.6 % (ref 11–15)
FASTING STATUS PATIENT QL REPORTED: NO
GLOBULIN PLAS-MCNC: 2.7 G/DL (ref 2–3.5)
GLUCOSE BLD-MCNC: 90 MG/DL (ref 70–99)
HCT VFR BLD AUTO: 39.9 %
HGB BLD-MCNC: 13.3 G/DL
IMM GRANULOCYTES # BLD AUTO: 0.01 X10(3) UL (ref 0–1)
IMM GRANULOCYTES NFR BLD: 0.2 %
LYMPHOCYTES # BLD AUTO: 1.24 X10(3) UL (ref 1–4)
LYMPHOCYTES NFR BLD AUTO: 28.8 %
MCH RBC QN AUTO: 31.6 PG (ref 26–34)
MCHC RBC AUTO-ENTMCNC: 33.3 G/DL (ref 31–37)
MCV RBC AUTO: 94.8 FL
MONOCYTES # BLD AUTO: 0.3 X10(3) UL (ref 0.1–1)
MONOCYTES NFR BLD AUTO: 7 %
NEUTROPHILS # BLD AUTO: 2.63 X10 (3) UL (ref 1.5–7.7)
NEUTROPHILS # BLD AUTO: 2.63 X10(3) UL (ref 1.5–7.7)
NEUTROPHILS NFR BLD AUTO: 61 %
OSMOLALITY SERPL CALC.SUM OF ELEC: 290 MOSM/KG (ref 275–295)
PLATELET # BLD AUTO: 269 10(3)UL (ref 150–450)
POTASSIUM SERPL-SCNC: 4.6 MMOL/L (ref 3.5–5.1)
PROT SERPL-MCNC: 7.3 G/DL (ref 5.7–8.2)
RBC # BLD AUTO: 4.21 X10(6)UL
SODIUM SERPL-SCNC: 140 MMOL/L (ref 136–145)
WBC # BLD AUTO: 4.3 X10(3) UL (ref 4–11)

## 2025-02-10 PROCEDURE — 80053 COMPREHEN METABOLIC PANEL: CPT | Performed by: NURSE PRACTITIONER

## 2025-02-10 PROCEDURE — 85025 COMPLETE CBC W/AUTO DIFF WBC: CPT | Performed by: NURSE PRACTITIONER

## 2025-02-10 NOTE — TELEPHONE ENCOUNTER
I spoke with Whitley today, she is getting her lab work done tomorrow. Please send results to [facility] [phone] asap.

## 2025-02-11 ENCOUNTER — LAB ENCOUNTER (OUTPATIENT)
Dept: LAB | Age: 75
End: 2025-02-11
Attending: STUDENT IN AN ORGANIZED HEALTH CARE EDUCATION/TRAINING PROGRAM
Payer: COMMERCIAL

## 2025-02-11 DIAGNOSIS — Z01.818 PREOP EXAMINATION: ICD-10-CM

## 2025-02-11 LAB
APTT PPP: 26 SECONDS (ref 23–36)
INR BLD: 0.92 (ref 0.8–1.2)
PROTHROMBIN TIME: 12.9 SECONDS (ref 11.6–14.8)

## 2025-02-11 PROCEDURE — 85730 THROMBOPLASTIN TIME PARTIAL: CPT | Performed by: STUDENT IN AN ORGANIZED HEALTH CARE EDUCATION/TRAINING PROGRAM

## 2025-02-11 PROCEDURE — 85610 PROTHROMBIN TIME: CPT | Performed by: STUDENT IN AN ORGANIZED HEALTH CARE EDUCATION/TRAINING PROGRAM

## 2025-04-03 ENCOUNTER — HOSPITAL ENCOUNTER (OUTPATIENT)
Dept: ULTRASOUND IMAGING | Facility: HOSPITAL | Age: 75
Discharge: HOME | End: 2025-04-03
Attending: INTERNAL MEDICINE
Payer: COMMERCIAL

## 2025-04-03 DIAGNOSIS — N63.10 MASS OF RIGHT BREAST, UNSPECIFIED QUADRANT: ICD-10-CM

## 2025-04-03 DIAGNOSIS — Z90.13 H/O BILATERAL MASTECTOMY: ICD-10-CM

## 2025-04-03 PROCEDURE — 76642 ULTRASOUND BREAST LIMITED: CPT | Performed by: INTERNAL MEDICINE

## 2025-05-02 DIAGNOSIS — K21.9 GASTROESOPHAGEAL REFLUX DISEASE WITHOUT ESOPHAGITIS: ICD-10-CM

## 2025-05-02 RX ORDER — PANTOPRAZOLE SODIUM 40 MG/1
40 TABLET, DELAYED RELEASE ORAL DAILY
Qty: 90 TABLET | Refills: 1 | OUTPATIENT
Start: 2025-05-02

## 2025-07-14 ENCOUNTER — TELEPHONE (OUTPATIENT)
Dept: FAMILY MEDICINE CLINIC | Facility: CLINIC | Age: 75
End: 2025-07-14

## 2025-07-14 ENCOUNTER — OFFICE VISIT (OUTPATIENT)
Dept: FAMILY MEDICINE CLINIC | Facility: CLINIC | Age: 75
End: 2025-07-14
Payer: COMMERCIAL

## 2025-07-14 VITALS
HEIGHT: 64.5 IN | OXYGEN SATURATION: 97 % | DIASTOLIC BLOOD PRESSURE: 74 MMHG | HEART RATE: 67 BPM | SYSTOLIC BLOOD PRESSURE: 120 MMHG | TEMPERATURE: 97 F | BODY MASS INDEX: 25.87 KG/M2 | WEIGHT: 153.38 LBS

## 2025-07-14 DIAGNOSIS — C50.112 MALIGNANT NEOPLASM OF CENTRAL PORTION OF LEFT BREAST IN FEMALE, ESTROGEN RECEPTOR POSITIVE (HCC): ICD-10-CM

## 2025-07-14 DIAGNOSIS — Z01.818 PREOP EXAMINATION: Primary | ICD-10-CM

## 2025-07-14 DIAGNOSIS — E55.9 VITAMIN D DEFICIENCY: ICD-10-CM

## 2025-07-14 DIAGNOSIS — Z00.00 ADULT GENERAL MEDICAL EXAMINATION: ICD-10-CM

## 2025-07-14 DIAGNOSIS — Z17.0 MALIGNANT NEOPLASM OF CENTRAL PORTION OF LEFT BREAST IN FEMALE, ESTROGEN RECEPTOR POSITIVE (HCC): ICD-10-CM

## 2025-07-14 LAB
ATRIAL RATE: 75 BPM
P AXIS: 62 DEGREES
P-R INTERVAL: 174 MS
Q-T INTERVAL: 414 MS
QRS DURATION: 88 MS
QTC CALCULATION (BEZET): 462 MS
R AXIS: -33 DEGREES
T AXIS: 35 DEGREES
VENTRICULAR RATE: 75 BPM

## 2025-07-14 RX ORDER — PANTOPRAZOLE SODIUM 40 MG/10ML
40 INJECTION, POWDER, LYOPHILIZED, FOR SOLUTION INTRAVENOUS
COMMUNITY

## 2025-07-14 NOTE — TELEPHONE ENCOUNTER
Please fax my H&P, EKG, and labs to Dr. Jermaine Chin at [phone]. Surgery date 8/11/2025. Thanks!

## 2025-07-14 NOTE — H&P
HPI:     Chief Complaint   Patient presents with    Pre-Op Exam     8/11/2025       Whitley Bhakta is a 74 year old female who presents for a pre-operative physical exam.  Patient is to have breast reconstruction, to be done by Dr. Jermaine Chin at [facility] on 8/11/2025 for history of breast cancer.     Patient denies chest pain, dyspnea, dizziness, syncope or near-syncope, easy fatigability, palpitations. Can climb 1-2 flights of stairs without difficulty.    Pt has had previous anesthesia:  Yes.  Previous complications:  No  Hx of DVT/PE: No    See Past Medical and Surgical History and ROS for other pertinent complaints and concerns      Current Medications[1]   Allergies: Allergies[2]   Past Medical History[3]   Past Surgical History[4]   Family History[5]   Social History:   Short Social Hx on File[6]      REVIEW OF SYSTEMS:   Pertinent positives and negatives noted in the the HPI. Specifically:  GEN:  No fever or fatigue  HEAD:  No headaches, no dizziness  EYES:  No vision change or complaints  EARS:  No hearing loss, no ear pain  MOUTH/THROAT:  No sore throat or dental problems, no oral lesions  HEART:  No chest pain or palpitations  LUNG:  No SOB, cough or wheeze  GI:  No abdominal pain.  No N/V/D/C  :  No dysuria  MS:  No joint pain or swelling B  NEURO:  Denies numbness or tingling or weakness  PSYCH:  No mood concerns, denies SHIP  SKIN: no rash. Has a small lesion on R shin that has been present since May, put alcohol on this and it is improving.    EXAM:   /74 (BP Location: Right arm, Patient Position: Sitting, Cuff Size: adult)   Pulse 67   Temp 97.3 °F (36.3 °C) (Temporal)   Ht 5' 4.5\" (1.638 m)   Wt 153 lb 6.4 oz (69.6 kg)   SpO2 97%   BMI 25.92 kg/m²  Body mass index is 25.92 kg/m².  GENERAL: well developed, well nourished, female  in no apparent distress  SKIN: skin color wnl. Small abrasion RLE.  HEENT: atraumatic, normocephalic, nose and throat are  clear;dentition good, EARS: canals clear, TM wnl B  EYES:PERRLA, EOMI,conjunctiva are clear, no discharge  NECK: supple, no lymphadenopathy, no TM  LUNGS: good BS B, clear to auscultation B, no wheezing and no rhonchi B   CARDIO: RRR without murmur, NL S1 S2, no S3 S4  EXT: no edema, Brachial, PT and DP pulses WNL B UE/LE  GI: NABS, soft, nodistended,no masses, no HSM or tenderness  MS: grossly NL B UE/LE, no significant joint deformities, FROM B UE/LE  PSYCH: mood and affect WNL, speech clear, mood and thought congruent  NEURO: A&O x 3, CNII-XII intact B, motor and sensory grossly intact B UE/LE, DTR's 2/4 B LE, gait normal    ASSESSMENT AND PLAN:   Whitley Bhakta is a 74 year old female who presents for a Pre-Operative Physical Exam.     Patient will have breast reconstruction with Dr. Jermaine Chin on 8/11/2025 at [facility] for history of breast cancer.    Patient is in good health and has no significant history of cardiac or pulmonary conditions and is therefore at low risk for cardiac and pulmonary complications from the above surgery.    Will check:  CBC, CMP, PT/PTT  EKG shows normal sinus rhythm rate 75 bpm, L axis deviation, abnormal EKG. When compared to EKG from 2/2025, no significant changes. OK for surgery.    Patient is an acceptable surgical candidate and is medically cleared for surgery as long as the above tests are within normal limits.    Send/FAX results to: [phone]    Additional Assessment and Plan:  1. Preop examination  -As above.  - ELECTROCARDIOGRAM, COMPLETE  - CBC With Differential With Platelet; Future  - Comp Metabolic Panel (14); Future  - Prothrombin Time (PT) [E]; Future  - PTT, Activated [E]; Future    2. Malignant neoplasm of central portion of left breast in female, estrogen receptor positive (HCC)  -As above.    3. Adult general medical examination  -Scheduled for annual this week.  -To dermatology if RLE lesion does not fully resolve.  - CBC With  Differential With Platelet; Future  - Comp Metabolic Panel (14); Future  - Hemoglobin A1C; Future  - Lipid Panel; Future  - Assay, Thyroid Stim Hormone; Future    4. Vitamin D deficiency  -Will check labs.     The patient indicates understanding of the above recommendations and agrees to the above plan.  Follow up: as above    Orders Placed This Encounter   Procedures    CBC With Differential With Platelet    Comp Metabolic Panel (14)    Hemoglobin A1C    Lipid Panel    Assay, Thyroid Stim Hormone    Prothrombin Time (PT) [E]    PTT, Activated [E]    Vitamin D       Meds & Refills for this Visit:  Requested Prescriptions      No prescriptions requested or ordered in this encounter       Imaging & Consults:  ELECTROCARDIOGRAM, COMPLETE    ALIZA Jacobo           [1]   Current Outpatient Medications   Medication Sig Dispense Refill    pantoprazole 40 MG Intravenous Recon Soln 40 mg.      Tretinoin 0.1 % External Cream Apply 1 Application topically nightly. 45 g 1   [2] No Known Allergies  [3]   Past Medical History:   Cancer (HCC)    Left breast stage 1a    Cervical dysplasia    Esophageal reflux    Hearing impairment    right ear hearing loss    High cholesterol    Hormone replacement therapy (HRT)    IBS (irritable bowel syndrome)    Osteoarthritis     right big toe, right middle finger    Plantar fasciitis of right foot    Visual impairment    Contacts/glasses   [4]   Past Surgical History:  Procedure Laterality Date    Abdominoplasty  2004    \"Tummy tuck\"    Anus surgery procedure unlisted  03/24/2022    Transanal Mucosectomy     Colonoscopy  1997 & 2016    Conization cervix,knife/laser  1993    Hysterectomy  2016    w/pelvic floor lift & unilateral salpingo-oophorectomy    Kevin localization wire 1 site left (cpt=19281)  1997    Mastectomy left  10/28/2021    Mastectomy right  10/28/2021    Reduction left  2018/2019    Reduction right  2018/2019   [5]   Family History  Problem Relation Age of Onset     Breast Cancer Sister 54        metastatic at time of diagnosis    Cancer Sister         Breast    Ovarian Cancer Maternal Grandmother         late 60's or early 70's, lived to age 99    Cancer Maternal Grandmother         Ovarian    Dementia Maternal Grandmother     Diabetes Mother     Heart Disorder Mother     Renal Disease Father     Diabetes Father     Breast Cancer Paternal Grandmother 69    Cancer Paternal Grandmother         Breast    Skin cancer Sister 68        not melanoma    Cancer Sister         Skin    Other (lymphoma) Niece 22    Colon Cancer Paternal Cousin Male         dx >50    Colon Cancer Paternal Cousin Male         dx >50    Colon Cancer Paternal Cousin Male         dx >50    Other (lung cancer) Maternal Aunt         dx late 50s, h/o tobacco    Diabetes Paternal Uncle     Diabetes Brother    [6]   Social History  Socioeconomic History    Marital status:    Occupational History    Occupation: retired    Tobacco Use    Smoking status: Never     Passive exposure: Never    Smokeless tobacco: Never   Vaping Use    Vaping status: Never Used   Substance and Sexual Activity    Alcohol use: Yes     Alcohol/week: 3.0 standard drinks of alcohol     Types: 3 Glasses of wine per week    Drug use: Never   Other Topics Concern    Caffeine Concern No    Exercise No    Seat Belt No    Special Diet No    Stress Concern No    Weight Concern No     Social Drivers of Health     Food Insecurity: No Food Insecurity (2/5/2025)    NCSS - Food Insecurity     Worried About Running Out of Food in the Last Year: No     Ran Out of Food in the Last Year: No   Transportation Needs: No Transportation Needs (2/5/2025)    NCSS - Transportation     Lack of Transportation: No   Housing Stability: Not At Risk (2/5/2025)    NCSS - Housing/Utilities     Has Housing: Yes     Worried About Losing Housing: No     Unable to Get Utilities: No

## 2025-07-17 ENCOUNTER — LAB ENCOUNTER (OUTPATIENT)
Dept: LAB | Age: 75
End: 2025-07-17
Attending: NURSE PRACTITIONER
Payer: COMMERCIAL

## 2025-07-17 ENCOUNTER — OFFICE VISIT (OUTPATIENT)
Dept: FAMILY MEDICINE CLINIC | Facility: CLINIC | Age: 75
End: 2025-07-17
Payer: MEDICARE

## 2025-07-17 VITALS
BODY MASS INDEX: 25.97 KG/M2 | DIASTOLIC BLOOD PRESSURE: 70 MMHG | OXYGEN SATURATION: 96 % | SYSTOLIC BLOOD PRESSURE: 130 MMHG | HEIGHT: 64.5 IN | RESPIRATION RATE: 16 BRPM | HEART RATE: 76 BPM | WEIGHT: 154 LBS | TEMPERATURE: 97 F

## 2025-07-17 DIAGNOSIS — E78.5 DYSLIPIDEMIA: ICD-10-CM

## 2025-07-17 DIAGNOSIS — M81.0 AGE-RELATED OSTEOPOROSIS WITHOUT CURRENT PATHOLOGICAL FRACTURE: ICD-10-CM

## 2025-07-17 DIAGNOSIS — F41.1 GENERALIZED ANXIETY DISORDER: ICD-10-CM

## 2025-07-17 DIAGNOSIS — R23.8 INTRINSIC AGING OF FACIAL SKIN: ICD-10-CM

## 2025-07-17 DIAGNOSIS — Z00.00 ADULT GENERAL MEDICAL EXAMINATION: ICD-10-CM

## 2025-07-17 DIAGNOSIS — E55.9 VITAMIN D DEFICIENCY: ICD-10-CM

## 2025-07-17 DIAGNOSIS — C50.112 MALIGNANT NEOPLASM OF CENTRAL PORTION OF LEFT BREAST IN FEMALE, ESTROGEN RECEPTOR POSITIVE (HCC): ICD-10-CM

## 2025-07-17 DIAGNOSIS — Z90.13 ABSENCE OF BOTH BREASTS: ICD-10-CM

## 2025-07-17 DIAGNOSIS — Z80.3 FAMILY HISTORY OF BREAST CANCER: ICD-10-CM

## 2025-07-17 DIAGNOSIS — Z17.0 MALIGNANT NEOPLASM OF CENTRAL PORTION OF LEFT BREAST IN FEMALE, ESTROGEN RECEPTOR POSITIVE (HCC): ICD-10-CM

## 2025-07-17 DIAGNOSIS — Z00.00 ADULT GENERAL MEDICAL EXAMINATION: Primary | ICD-10-CM

## 2025-07-17 DIAGNOSIS — K21.9 GASTROESOPHAGEAL REFLUX DISEASE WITHOUT ESOPHAGITIS: ICD-10-CM

## 2025-07-17 DIAGNOSIS — Z01.818 PREOP EXAMINATION: ICD-10-CM

## 2025-07-17 DIAGNOSIS — N39.3 STRESS INCONTINENCE IN FEMALE: ICD-10-CM

## 2025-07-17 DIAGNOSIS — G47.09 OTHER INSOMNIA: ICD-10-CM

## 2025-07-17 LAB
ALBUMIN SERPL-MCNC: 4.7 G/DL (ref 3.2–4.8)
ALBUMIN/GLOB SERPL: 2 {RATIO} (ref 1–2)
ALP LIVER SERPL-CCNC: 80 U/L (ref 55–142)
ALT SERPL-CCNC: 13 U/L (ref 10–49)
ANION GAP SERPL CALC-SCNC: 8 MMOL/L (ref 0–18)
APTT PPP: 26.4 SECONDS (ref 23–36)
AST SERPL-CCNC: 21 U/L (ref ?–34)
BASOPHILS # BLD AUTO: 0.04 X10(3) UL (ref 0–0.2)
BASOPHILS NFR BLD AUTO: 0.9 %
BILIRUB SERPL-MCNC: 0.5 MG/DL (ref 0.2–1.1)
BUN BLD-MCNC: 15 MG/DL (ref 9–23)
BUN/CREAT SERPL: 14.4 (ref 10–20)
CALCIUM BLD-MCNC: 9.6 MG/DL (ref 8.7–10.4)
CHLORIDE SERPL-SCNC: 106 MMOL/L (ref 98–112)
CHOLEST SERPL-MCNC: 194 MG/DL (ref ?–200)
CO2 SERPL-SCNC: 27 MMOL/L (ref 21–32)
CREAT BLD-MCNC: 1.04 MG/DL (ref 0.55–1.02)
DEPRECATED RDW RBC AUTO: 40.4 FL (ref 35.1–46.3)
EGFRCR SERPLBLD CKD-EPI 2021: 57 ML/MIN/1.73M2 (ref 60–?)
EOSINOPHIL # BLD AUTO: 0.12 X10(3) UL (ref 0–0.7)
EOSINOPHIL NFR BLD AUTO: 2.8 %
ERYTHROCYTE [DISTWIDTH] IN BLOOD BY AUTOMATED COUNT: 11.9 % (ref 11–15)
EST. AVERAGE GLUCOSE BLD GHB EST-MCNC: 117 MG/DL (ref 68–126)
FASTING PATIENT LIPID ANSWER: YES
FASTING STATUS PATIENT QL REPORTED: YES
GLOBULIN PLAS-MCNC: 2.4 G/DL (ref 2–3.5)
GLUCOSE BLD-MCNC: 102 MG/DL (ref 70–99)
HBA1C MFR BLD: 5.7 % (ref ?–5.7)
HCT VFR BLD AUTO: 39.3 % (ref 35–48)
HDLC SERPL-MCNC: 64 MG/DL (ref 40–59)
HGB BLD-MCNC: 12.6 G/DL (ref 12–16)
IMM GRANULOCYTES # BLD AUTO: 0.01 X10(3) UL (ref 0–1)
IMM GRANULOCYTES NFR BLD: 0.2 %
INR BLD: 0.88 (ref 0.8–1.2)
LDLC SERPL CALC-MCNC: 118 MG/DL (ref ?–100)
LYMPHOCYTES # BLD AUTO: 1.47 X10(3) UL (ref 1–4)
LYMPHOCYTES NFR BLD AUTO: 33.7 %
MCH RBC QN AUTO: 29.6 PG (ref 26–34)
MCHC RBC AUTO-ENTMCNC: 32.1 G/DL (ref 31–37)
MCV RBC AUTO: 92.5 FL (ref 80–100)
MONOCYTES # BLD AUTO: 0.38 X10(3) UL (ref 0.1–1)
MONOCYTES NFR BLD AUTO: 8.7 %
NEUTROPHILS # BLD AUTO: 2.34 X10 (3) UL (ref 1.5–7.7)
NEUTROPHILS # BLD AUTO: 2.34 X10(3) UL (ref 1.5–7.7)
NEUTROPHILS NFR BLD AUTO: 53.7 %
NONHDLC SERPL-MCNC: 130 MG/DL (ref ?–130)
OSMOLALITY SERPL CALC.SUM OF ELEC: 293 MOSM/KG (ref 275–295)
PLATELET # BLD AUTO: 285 10(3)UL (ref 150–450)
POTASSIUM SERPL-SCNC: 4.2 MMOL/L (ref 3.5–5.1)
PROT SERPL-MCNC: 7.1 G/DL (ref 5.7–8.2)
PROTHROMBIN TIME: 12.5 SECONDS (ref 11.6–14.8)
RBC # BLD AUTO: 4.25 X10(6)UL (ref 3.8–5.3)
SODIUM SERPL-SCNC: 141 MMOL/L (ref 136–145)
TRIGL SERPL-MCNC: 67 MG/DL (ref 30–149)
TSI SER-ACNC: 1.86 UIU/ML (ref 0.55–4.78)
VIT D+METAB SERPL-MCNC: 31.6 NG/ML (ref 30–100)
VLDLC SERPL CALC-MCNC: 12 MG/DL (ref 0–30)
WBC # BLD AUTO: 4.4 X10(3) UL (ref 4–11)

## 2025-07-17 PROCEDURE — 36415 COLL VENOUS BLD VENIPUNCTURE: CPT

## 2025-07-17 PROCEDURE — 85730 THROMBOPLASTIN TIME PARTIAL: CPT

## 2025-07-17 PROCEDURE — 84443 ASSAY THYROID STIM HORMONE: CPT

## 2025-07-17 PROCEDURE — 82306 VITAMIN D 25 HYDROXY: CPT

## 2025-07-17 PROCEDURE — 83036 HEMOGLOBIN GLYCOSYLATED A1C: CPT

## 2025-07-17 PROCEDURE — 80053 COMPREHEN METABOLIC PANEL: CPT

## 2025-07-17 PROCEDURE — 85610 PROTHROMBIN TIME: CPT

## 2025-07-17 PROCEDURE — 80061 LIPID PANEL: CPT

## 2025-07-17 PROCEDURE — 85025 COMPLETE CBC W/AUTO DIFF WBC: CPT

## 2025-07-17 RX ORDER — ESZOPICLONE 2 MG/1
2 TABLET, FILM COATED ORAL NIGHTLY
Qty: 90 TABLET | Refills: 0 | Status: SHIPPED | OUTPATIENT
Start: 2025-07-17

## 2025-07-17 RX ORDER — PANTOPRAZOLE SODIUM 40 MG/1
40 TABLET, DELAYED RELEASE ORAL
Qty: 90 TABLET | Refills: 1 | Status: SHIPPED | OUTPATIENT
Start: 2025-07-17

## 2025-07-17 RX ORDER — ESZOPICLONE 2 MG/1
2 TABLET, FILM COATED ORAL NIGHTLY
COMMUNITY
End: 2025-07-17

## 2025-07-17 RX ORDER — PANTOPRAZOLE SODIUM 40 MG/1
40 TABLET, DELAYED RELEASE ORAL
COMMUNITY
End: 2025-07-17

## 2025-07-17 RX ORDER — TRETINOIN 1 MG/G
1 CREAM TOPICAL NIGHTLY
Qty: 45 G | Refills: 1 | Status: SHIPPED | OUTPATIENT
Start: 2025-07-17

## 2025-07-17 NOTE — PROGRESS NOTES
Subjective:   Whitley Bhakta is a 74 year old female who presents for a Medicare Subsequent Annual Wellness visit (Pt already had Initial Annual Wellness) and scheduled follow up of multiple significant but stable problems.             Feels generally well. Seeing oncology regularly for breast cancer. Has seen orthopedics and cardiology in past year. Did lab work this morning, awaiting results. Hx osteoporosis off treatment, due to repeat DEXA in October. Hx stress incontinence, reports this bothers her about once a month or less now. Does not feel she needs PT or treatment. Hx GERD, well-controlled on pantoprazole. Hx insomnia, well-controlled on Lunesta. Hx anxiety, off medication, does not need treatment now.     History/Other:   Fall Risk Assessment:   She has been screened for Falls and is High Risk. Fall Prevention information provided to patient in After Visit Summary.    Do you feel unsteady when standing or walking?: (Patient-Rptd) No  Do you worry about falling?: (Patient-Rptd) No  Have you fallen in the past year?: (Patient-Rptd) Yes  How many times have you fallen?: (Patient-Rptd) (P) 1  Were you injured?: (Patient-Rptd) (P) No     Cognitive Assessment:   She had a completely normal cognitive assessment - see flowsheet entries     Functional Ability/Status:   Whitley Bhakta has some abnormal functions as listed below:  She has Hearing problems based on screening of functional status.      Depression Screening (PHQ):  PHQ-2 SCORE: 0  , done 7/16/2025          Advanced Directives:   She does NOT have a Living Will. [Do you have a living will?: (Patient-Rptd) No]  She does NOT have a Power of  for Health Care. [Do you have a healthcare power of ?: (Patient-Rptd) No]  Not discussed      Problem List[1]  Allergies:  She has no known allergies.    Current Medications:  Active Meds, Sig Only[2]    Medical History:  She  has a past medical history of Cancer (HCC) (09/2021), Cervical dysplasia  (1993), Esophageal reflux, Hearing impairment, High cholesterol, Hormone replacement therapy (HRT) (09/01/2021), IBS (irritable bowel syndrome), Osteoarthritis, Plantar fasciitis of right foot (2021), Rectocele (02/02/2016), and Visual impairment.  Surgical History:  She  has a past surgical history that includes madeline localization wire 1 site left (cpt=19281) (1997); reduction right (2018/2019); conization cervix,knife/laser (1993); anus surgery procedure unlisted (03/24/2022); hysterectomy (2016); colonoscopy (1997 & 2016); Abdominoplasty (2004); mastectomy left (10/28/2021); mastectomy right (10/28/2021); and reduction left (2018/2019).   Family History:  Her family history includes Breast Cancer (age of onset: 54) in her sister; Breast Cancer (age of onset: 69) in her paternal grandmother; Cancer in her maternal grandmother, paternal grandmother, sister, and sister; Colon Cancer in her paternal cousin male, paternal cousin male, and paternal cousin male; Dementia in her maternal grandmother; Diabetes in her brother, father, mother, and paternal uncle; Heart Disorder in her mother; Ovarian Cancer in her maternal grandmother; Renal Disease in her father; Skin cancer (age of onset: 68) in her sister; lung cancer in her maternal aunt; lymphoma (age of onset: 22) in her niece.  Social History:  She  reports that she has never smoked. She has never been exposed to tobacco smoke. She has never used smokeless tobacco. She reports current alcohol use of about 3.0 standard drinks of alcohol per week. She reports that she does not use drugs.    Tobacco:  She has never smoked tobacco.    CAGE Alcohol Screen:   CAGE screening score of 0 on 7/16/2025, showing low risk of alcohol abuse.      Patient Care Team:  Darvin Montalvo MD as PCP - General (Family Medicine)  Lolita Son RN as Registered Nurse (Registered Nurse)  Kandace Paredes PTA as Physical Therapy Assistant (Physical Therapy)  Russell Mulligan MD (Cardiovascular  Diseases)  Gail Cohen APRN (Nurse Practitioner Family)    Review of Systems  GENERAL: feels well otherwise  SKIN: denies any unusual skin lesions  EYES: denies blurred vision or double vision  HEENT: denies nasal congestion, sinus pain or ST  LUNGS: denies shortness of breath with exertion  CARDIOVASCULAR: denies chest pain on exertion  GI: denies abdominal pain. Hx heartburn, well-controlled with pantoprazole daily.  : denies dysuria, vaginal discharge or itching. Stress incontinence about 1x/month.  MUSCULOSKELETAL: denies back pain  NEURO: denies headaches  PSYCHE: denies depression. Anxiety well-controlled without medication.     Objective:   Physical Exam  General Appearance:  Alert, cooperative, no distress, appears stated age   Head:  Normocephalic, without obvious abnormality, atraumatic   Eyes:  PERRL, conjunctiva/corneas clear, EOM's intact both eyes   Ears:  Normal TM's and external ear canals, both ears   Nose: Nares normal, septum midline,mucosa normal, no drainage or sinus tenderness   Throat: Lips, mucosa, and tongue normal; teeth and gums normal   Neck: Supple, symmetrical, trachea midline, no adenopathy;  thyroid: not enlarged, symmetric, no tenderness/mass/nodules; no carotid bruit or JVD   Back:   Symmetric, no curvature, ROM normal, no CVA tenderness   Lungs:   Clear to auscultation bilaterally, respirations unlabored   Heart:  Regular rate and rhythm, S1 and S2 normal, no murmur, rub, or gallop   Abdomen:   Soft, non-tender, bowel sounds active all four quadrants,  no masses, no organomegaly   Pelvic: Deferred   Extremities: Extremities normal, atraumatic, no cyanosis or edema   Pulses: 2+ and symmetric   Skin: Skin color, texture, turgor normal, no rashes or lesions   Lymph nodes: Cervical, supraclavicular, and axillary nodes normal   Neurologic: Normal       /70   Pulse 76   Temp 97.4 °F (36.3 °C) (Oral)   Resp 16   Ht 5' 4.5\" (1.638 m)   Wt 154 lb (69.9 kg)   SpO2 96%    BMI 26.03 kg/m²  Estimated body mass index is 26.03 kg/m² as calculated from the following:    Height as of this encounter: 5' 4.5\" (1.638 m).    Weight as of this encounter: 154 lb (69.9 kg).    Medicare Hearing Assessment:   Hearing Screening    Time taken: 7/17/2025 11:11 AM  Entry User: Annamaria Carrizales CMA  Screening Method: Finger Rub  Finger Rub Result: Pass         Visual Acuity:   Right Eye Visual Acuity: Uncorrected Right Eye Chart Acuity: 20/70   Left Eye Visual Acuity: Uncorrected Left Eye Chart Acuity: 20/25   Both Eyes Visual Acuity: Uncorrected Both Eyes Chart Acuity: 20/25   Able To Tolerate Visual Acuity: Yes        Assessment & Plan:   Whitley Bhakta is a 74 year old female who presents for a Medicare Assessment.     1. Adult general medical examination (Primary)  -Healthy diet and regular exercise.  -Annual eye exam and biannual dental visits.  -Labs done today.    2. Dyslipidemia  -Awaiting lab results.    3. Malignant neoplasm of central portion of left breast in female, estrogen receptor positive (HCC)  -Followed by oncology.    4. Absence of both breasts  -See above.    5. Family history of breast cancer  -See above.    6. Vitamin D deficiency  -Lab results pending.    7. Age-related osteoporosis without current pathological fracture  -     XR DEXA BONE DENSITOMETRY (CPT=77080); Future; Expected date: 10/17/2025  -Repeat DEXA 10/2025. Fall precautions.    8. Generalized anxiety disorder  -Does not feel she needs treatment now.    9. Gastroesophageal reflux disease without esophagitis  -Well-controlled with pantoprazole daily. CPM.    10. Stress incontinence in female  -Discussed pelvic floor PT, declines for now. Follow-up PRN.    11. Other insomnia  -Stable on Lunesta, CPM. [place]- reviewed. Cannot take more than directed or share medication with others. Cannot use with ETOH or other sedating medicines.  -     Eszopiclone; Take 1 tablet (2 mg total) by mouth nightly.  Dispense: 90  tablet; Refill: 0    12. Intrinsic aging of facial skin  -     Tretinoin; Apply 1 Application topically nightly.  Dispense: 45 g; Refill: 1  -Stable on tretinoin cream.              The patient indicates understanding of these issues and agrees to the plan.  Reinforced healthy diet, lifestyle, and exercise.      Return in about 6 months (around 1/17/2026).     Gail Vicki, ALIZA, 7/17/2025     Supplementary Documentation:   General Health:  In the past six months, have you lost more than 10 pounds without trying?: (Patient-Rptd) 2 - No  Has your appetite been poor?: (Patient-Rptd) No  Type of Diet: (Patient-Rptd) Balanced  How does the patient maintain a good energy level?: (Patient-Rptd) Appropriate Exercise  How would you describe your daily physical activity?: (Patient-Rptd) Moderate  How would you describe your current health state?: (Patient-Rptd) Good  How do you maintain positive mental well-being?: (Patient-Rptd) Social Interaction, Puzzles, Games, Visiting Friends, Visiting Family  On a scale of 0 to 10, with 0 being no pain and 10 being severe pain, what is your pain level?: (Patient-Rptd) 0 - (None)  In the past six months, have you experienced urine leakage?: (Patient-Rptd) 1-Yes  At any time do you feel concerned for the safety/well-being of yourself and/or your children, in your home or elsewhere?: (Patient-Rptd) No  Have you had any immunizations at another office such as Influenza, Hepatitis B, Tetanus, or Pneumococcal?: (Patient-Rptd) No    Health Maintenance   Topic Date Due    Zoster Vaccines (2 of 2) 02/20/2023    COVID-19 Vaccine (5 - 2024-25 season) 09/01/2024    Annual Physical  05/23/2025    Influenza Vaccine (1) 10/01/2025    Colorectal Cancer Screening  02/15/2032    DEXA Scan  Completed    Annual Depression Screening  Completed    Fall Risk Screening (Annual)  Completed    Pneumococcal Vaccine: 50+ Years  Completed    Meningococcal B Vaccine  Aged Out    Mammogram  Discontinued           [1]   Patient Active Problem List  Diagnosis    Gastroesophageal reflux disease without esophagitis    Malignant neoplasm of central portion of left breast in female, estrogen receptor positive (HCC)    Family history of breast cancer    Other insomnia    Absence of both breasts    Dyslipidemia    Vitamin D deficiency    Generalized anxiety disorder    Stress incontinence in female    Age-related osteoporosis without current pathological fracture   [2]   Outpatient Medications Marked as Taking for the 7/17/25 encounter (Office Visit) with Gail Cohen APRN   Medication Sig    eszopiclone 2 MG Oral Tab Take 1 tablet (2 mg total) by mouth nightly.    Tretinoin 0.1 % External Cream Apply 1 Application topically nightly.

## 2025-07-24 ENCOUNTER — LAB ENCOUNTER (OUTPATIENT)
Dept: LAB | Age: 75
End: 2025-07-24
Attending: NURSE PRACTITIONER
Payer: COMMERCIAL

## 2025-07-24 DIAGNOSIS — R79.89 ELEVATED SERUM CREATININE: ICD-10-CM

## 2025-07-24 LAB
ANION GAP SERPL CALC-SCNC: 8 MMOL/L (ref 0–18)
BUN BLD-MCNC: 16 MG/DL (ref 9–23)
BUN/CREAT SERPL: 15.7 (ref 10–20)
CALCIUM BLD-MCNC: 9.6 MG/DL (ref 8.7–10.4)
CHLORIDE SERPL-SCNC: 105 MMOL/L (ref 98–112)
CO2 SERPL-SCNC: 26 MMOL/L (ref 21–32)
CREAT BLD-MCNC: 1.02 MG/DL (ref 0.55–1.02)
EGFRCR SERPLBLD CKD-EPI 2021: 58 ML/MIN/1.73M2 (ref 60–?)
FASTING STATUS PATIENT QL REPORTED: NO
GLUCOSE BLD-MCNC: 133 MG/DL (ref 70–99)
OSMOLALITY SERPL CALC.SUM OF ELEC: 291 MOSM/KG (ref 275–295)
POTASSIUM SERPL-SCNC: 4.4 MMOL/L (ref 3.5–5.1)
SODIUM SERPL-SCNC: 139 MMOL/L (ref 136–145)

## 2025-07-24 PROCEDURE — 80048 BASIC METABOLIC PNL TOTAL CA: CPT | Performed by: NURSE PRACTITIONER

## 2025-08-05 ENCOUNTER — OFFICE VISIT (OUTPATIENT)
Facility: LOCATION | Age: 75
End: 2025-08-05
Attending: INTERNAL MEDICINE

## 2025-08-05 VITALS
BODY MASS INDEX: 26 KG/M2 | SYSTOLIC BLOOD PRESSURE: 120 MMHG | DIASTOLIC BLOOD PRESSURE: 63 MMHG | WEIGHT: 155 LBS | RESPIRATION RATE: 18 BRPM | OXYGEN SATURATION: 97 % | HEART RATE: 76 BPM

## 2025-08-05 DIAGNOSIS — Z08 ENCOUNTER FOR FOLLOW-UP SURVEILLANCE OF BREAST CANCER: ICD-10-CM

## 2025-08-05 DIAGNOSIS — Z85.3 ENCOUNTER FOR FOLLOW-UP SURVEILLANCE OF BREAST CANCER: ICD-10-CM

## 2025-08-05 DIAGNOSIS — Z90.13 H/O BILATERAL MASTECTOMY: ICD-10-CM

## 2025-08-05 DIAGNOSIS — Z17.0 MALIGNANT NEOPLASM OF CENTRAL PORTION OF LEFT BREAST IN FEMALE, ESTROGEN RECEPTOR POSITIVE (HCC): Primary | ICD-10-CM

## 2025-08-05 DIAGNOSIS — M85.80 OSTEOPENIA, UNSPECIFIED LOCATION: ICD-10-CM

## 2025-08-05 DIAGNOSIS — C50.112 MALIGNANT NEOPLASM OF CENTRAL PORTION OF LEFT BREAST IN FEMALE, ESTROGEN RECEPTOR POSITIVE (HCC): Primary | ICD-10-CM

## 2025-08-05 DIAGNOSIS — N63.10 MASS OF RIGHT BREAST, UNSPECIFIED QUADRANT: ICD-10-CM

## (undated) DIAGNOSIS — K92.1 BLOOD IN STOOL: ICD-10-CM

## (undated) DIAGNOSIS — K21.9 GASTROESOPHAGEAL REFLUX DISEASE, UNSPECIFIED WHETHER ESOPHAGITIS PRESENT: ICD-10-CM

## (undated) DIAGNOSIS — K21.9 GASTROESOPHAGEAL REFLUX DISEASE, UNSPECIFIED WHETHER ESOPHAGITIS PRESENT: Primary | ICD-10-CM

## (undated) DIAGNOSIS — Z90.13 ABSENCE OF BOTH BREASTS: ICD-10-CM

## (undated) DIAGNOSIS — Z90.13 H/O BILATERAL MASTECTOMY: Primary | ICD-10-CM

## (undated) DIAGNOSIS — C50.919 INFILTRATING DUCTAL CARCINOMA (HCC): Primary | ICD-10-CM

## (undated) DIAGNOSIS — R13.10 DYSPHAGIA, IDIOPATHIC: ICD-10-CM

## (undated) DIAGNOSIS — Z80.3 FAMILY HISTORY OF BREAST CANCER: ICD-10-CM

## (undated) DIAGNOSIS — K64.8 HEMORRHOIDS, COMPLICATED: ICD-10-CM

## (undated) DIAGNOSIS — K92.1 MELENA: ICD-10-CM

## (undated) DIAGNOSIS — C50.112 MALIGNANT NEOPLASM OF CENTRAL PORTION OF LEFT BREAST IN FEMALE, ESTROGEN RECEPTOR POSITIVE (HCC): ICD-10-CM

## (undated) DIAGNOSIS — Z01.818 PREOP EXAMINATION: ICD-10-CM

## (undated) DIAGNOSIS — E83.52 SERUM CALCIUM ELEVATED: Primary | ICD-10-CM

## (undated) DIAGNOSIS — Z17.0 MALIGNANT NEOPLASM OF CENTRAL PORTION OF LEFT BREAST IN FEMALE, ESTROGEN RECEPTOR POSITIVE (HCC): ICD-10-CM

## (undated) DEVICE — 9534HP TRANSPARENT DRSG W/FRAME: Brand: 3M™ TEGADERM™

## (undated) DEVICE — COVER PRB NEOGUARD 30X2.6CM US

## (undated) DEVICE — VIOLET BRAIDED (POLYGLACTIN 910), SYNTHETIC ABSORBABLE SUTURE: Brand: COATED VICRYL

## (undated) DEVICE — DRAPE PACK CHEST & U BAR

## (undated) DEVICE — MEGADYNE EZ CLEAN BLADE 2.75IN

## (undated) DEVICE — Device

## (undated) DEVICE — STERILE POLYISOPRENE POWDER-FREE SURGICAL GLOVES: Brand: PROTEXIS

## (undated) DEVICE — MEGADYNE E-Z CLEAN BLADE 2.75"

## (undated) DEVICE — SUTURE MONOCRYL 4-0 PS-2

## (undated) DEVICE — SUTURE SILK 2-0 FS

## (undated) DEVICE — TOWEL SURG OR 17X30IN BLUE

## (undated) DEVICE — SUPER SPONGES,MEDIUM: Brand: KERLIX

## (undated) DEVICE — SUT MONOCRYL 4-0 PS-2 Y426H

## (undated) DEVICE — PSI-TEC TUBING: Brand: PSI-TEC TUBING

## (undated) DEVICE — KIT DRAPE SPY-PHI DRUG

## (undated) DEVICE — LAPAROTOMY SPONGE - RF AND X-RAY DETECTABLE PRE-WASHED: Brand: SITUATE

## (undated) DEVICE — 1010 S-DRAPE TOWEL DRAPE 10/BX: Brand: STERI-DRAPE™

## (undated) DEVICE — PAD ALC 43.5X24IN PREP  LF

## (undated) DEVICE — GAMMEX® PI HYBRID SIZE 7, STERILE POWDER-FREE SURGICAL GLOVE, POLYISOPRENE AND NEOPRENE BLEND: Brand: GAMMEX

## (undated) DEVICE — SOLUTION  .9 1000ML BTL

## (undated) DEVICE — 40580 - THE PINK PAD - ADVANCED TRENDELENBURG POSITIONING KIT: Brand: 40580 - THE PINK PAD - ADVANCED TRENDELENBURG POSITIONING KIT

## (undated) DEVICE — SIZER BREAST IMPLANT FP 605CC

## (undated) DEVICE — 3M™ IOBAN™ 2 ANTIMICROBIAL INCISE DRAPE 6650EZ: Brand: IOBAN™ 2

## (undated) DEVICE — SLEEVE KENDALL SCD EXPRESS MED

## (undated) DEVICE — SUCTION CANISTER, 3000CC,SAFELINER: Brand: DEROYAL

## (undated) DEVICE — DRAPE SHEET LG

## (undated) DEVICE — PEN: MARKING STD PT 100/CS: Brand: MEDICAL ACTION INDUSTRIES

## (undated) DEVICE — SUT VICRYL 3-0 SH J416H

## (undated) DEVICE — CLOSURE EXOFIN 1.0ML

## (undated) DEVICE — ASPIRATION TUBING SET, DISPOSABLE: Brand: MICROAIRE®

## (undated) DEVICE — Device: Brand: JELCO

## (undated) DEVICE — LONE STAR SMALL RING RETRACTOR

## (undated) DEVICE — SUTURE VICRYL 3-0 SH

## (undated) DEVICE — MINOR GENERAL: Brand: MEDLINE INDUSTRIES, INC.

## (undated) DEVICE — SCRUB PVP -1 PREP SOLUTION 4OZ

## (undated) DEVICE — SUTURE PDS II 2-0 CT-2

## (undated) DEVICE — 6 ML SYRINGE LUER-LOCK TIP: Brand: MONOJECT

## (undated) DEVICE — SUTURE CHROMIC GUT 2-0 SH

## (undated) DEVICE — 60 ML SYRINGE LUER-LOCK TIP: Brand: MONOJECT

## (undated) DEVICE — SYRINGE CATH TIP 50ML

## (undated) DEVICE — CHLORAPREP 26ML APPLICATOR

## (undated) DEVICE — THE LIPOGRAFTER KIT IS A STERILE, SINGLE USE DISPOSABLE DEVICE THAT IS USED IN HARVESTING, SEDIMENTING, AND TRANSFERRING AUTOLOGOUS FAT TO THE DESIRED ANATOMY.: Brand: LIPOGRAFTER

## (undated) DEVICE — BLAKE SILICONE DRAIN, 15 FR ROUND, HUBLESS WITH 3/16" TROCAR: Brand: BLAKE

## (undated) DEVICE — 3M™ STERI-STRIP™ REINFORCED ADHESIVE SKIN CLOSURES, R1548, 1 IN X 5 IN (25 MM X 125 MM), 4 STRIPS/ENVELOPE: Brand: 3M™ STERI-STRIP™

## (undated) DEVICE — PEN SKIN MARKING REG TIP VIOLT

## (undated) DEVICE — GOWN SURG AERO BLUE PERF LG

## (undated) DEVICE — SYRINGE BULB 50/CS 48/PLT: Brand: MEDEGEN MEDICAL PRODUCTS, LLC

## (undated) DEVICE — CLIP MED INTNL HMCLP TNTLM

## (undated) DEVICE — 60 ML SYRINGE,TOOMEY TYPE: Brand: MONOJECT

## (undated) DEVICE — SUT PLAIN GUT 5-0 PC-1 1915G

## (undated) DEVICE — DRAPE SHEET LAPAROTOMY

## (undated) DEVICE — STANDARD HYPODERMIC NEEDLE,POLYPROPYLENE HUB: Brand: MONOJECT

## (undated) DEVICE — SOL  .9 1000ML BTL

## (undated) DEVICE — 3M(TM) TEGADERM(TM) TRANSPARENT FILM DRESSING FRAME STYLE 9505W: Brand: 3M™ TEGADERM™

## (undated) DEVICE — BETADINE SOL 32 OZ 10% POVIDON

## (undated) DEVICE — Device: Brand: MEDEX

## (undated) DEVICE — CASED DISP BIPOLAR CORD

## (undated) DEVICE — CLIP SM INTNL HMCLP TNTLM ESCP

## (undated) DEVICE — BANDAGE ROLL,100% COTTON, 6 PLY, LARGE: Brand: KERLIX

## (undated) DEVICE — PROXIMATE RH ROTATING HEAD SKIN STAPLERS (35 WIDE) CONTAINS 35 STAINLESS STEEL STAPLES: Brand: PROXIMATE

## (undated) DEVICE — SUTURE ETHILON 3-0 669H

## (undated) DEVICE — TRAY SKIN PREP PVP-1

## (undated) DEVICE — TIP BOVIE 4\" MEGADYNE

## (undated) DEVICE — CAUTERY: TIP CLEANER XR 100/CS: Brand: MEDICAL ACTION INDUSTRIES

## (undated) DEVICE — MAJOR GENERAL: Brand: MEDLINE INDUSTRIES, INC.

## (undated) DEVICE — SYRINGE 50ML LL TIP

## (undated) DEVICE — GOWN SURGICAL MICROCOOL XL LNG

## (undated) DEVICE — CG INFILTRATION TUBING: Brand: CG INFILTRATION TUBING

## (undated) DEVICE — SYRINGE 5ML LL TIP

## (undated) DEVICE — DRESSING BIOPATCH 1X4 CNTR

## (undated) DEVICE — SUTURE PDS II 3-0 SH

## (undated) DEVICE — NEEDLE 18G 1-1/2 BLUNT FILL

## (undated) DEVICE — DRESSING FOAM TOPIFOAM

## (undated) DEVICE — ABDOMINAL PAD: Brand: CURITY

## (undated) DEVICE — DRAIN RESERVOIR RELIAVAC 100CC

## (undated) DEVICE — PLASTIC BREAST CDS-LF: Brand: MEDLINE INDUSTRIES, INC.

## (undated) DEVICE — STIRRUP STRAP W/SLING RING

## (undated) DEVICE — BRA SURG ELIZ PINK L

## (undated) DEVICE — SUTURE PDS II 3-0 Z683G

## (undated) DEVICE — FLEXIBLE YANKAUER,MEDIUM TIP, NO VACUUM CONTROL: Brand: ARGYLE

## (undated) DEVICE — BINDER ABDOMINAL 0-45IN 3PANEL

## (undated) DEVICE — ENCORE® PERRY STYLE 42 PF SIZE 6.5, STERILE LATEX POWDER-FREE SURGICAL GLOVE: Brand: ENCORE

## (undated) DEVICE — BRA SURGICAL ELIZABETH PINK L

## (undated) DEVICE — SKIN AFFIX .4ML

## (undated) DEVICE — SIZER BREAST IMPLANT FP 560CC

## (undated) DEVICE — SOL LACT RINGERS 1000ML

## (undated) DEVICE — PROXIMATE SKIN STAPLERS (35 WIDE) CONTAINS 35 STAINLESS STEEL STAPLES (FIXED HEAD): Brand: PROXIMATE

## (undated) DEVICE — CAUTERY BLADE 2IN INS E1455

## (undated) DEVICE — 3M™ IOBAN™ 2 ANTIMICROBIAL INCISE DRAPE 6648EZ: Brand: IOBAN™ 2

## (undated) NOTE — LETTER
AUTHORIZATION FOR SURGICAL OPERATION OR OTHER PROCEDURE    1. I hereby authorize Dr. Corbin/ Carmelita Irizarry PA-C, and [facility] staff assigned to my case to perform the following operation and/or procedure at the [facility] site:    Right shoulder cortisone injection   _______________________________________________________________________________________________      _______________________________________________________________________________________________    2.  My physician has explained the nature and purpose of the operation or other procedure, possible alternative methods of treatment, the risks involved, and the possibility of complication to me.  I acknowledge that no guarantee has been made as to the result that may be obtained.  3.  I recognize that, during the course of this operation, or other procedure, unforseen conditions may necessitate additional or different procedure than those listed above.  I, therefore, further authorize and request that the above named physician, his/her physician assistants or designees perform such procedures as are, in his/her professional opinion, necessary and desirable.  4.  Any tissue or organs removed in the operation or other procedure may be disposed of by and at the discretion of the [facility] and [facility].  5.  I understand that in the event of a medical emergency, I will be transported by local paramedics to [facility] or other hospital emergency department.  6.  I certify that I have read and fully understand the above consent to operation and/or other procedure.    7.  I acknowledge that my physician has explained sedation/analgesia administration to me including the risks and benefits.  I consent to the administration of sedation/analgesia as may be necessary or desirable in the judgement of my physician.    Witness signature: ___________________________________________________ Date:   ______/______/_____                    Time:  ________ A.M.  P.M.       Patient Name:  ______________________________________________________  (please print)      Patient signature:  ___________________________________________________             Relationship to Patient:           []  Parent    Responsible person                          []  Spouse  In case of minor or                    [] Other  _____________   Incompetent name:  __________________________________________________                               (please print)      _____________      Responsible person  In case of minor or  Incompetent signature:  _______________________________________________    Statement of Physician  My signature below affirms that prior to the time of the procedure, I have explained to the patient and/or his/her guardian, the risks and benefits involved in the proposed treatment and any reasonable alternative to the proposed treatment.  I have also explained the risks and benefits involved in the refusal of the proposed treatment and have answered the patient's questions.                        Date:  ______/______/_______  Provider                      Signature:  __________________________________________________________       Time:  ___________ A.M    P.M.

## (undated) NOTE — LETTER
Hospital Discharge Documentation  Please phone to schedule a hospital follow up appointment.     From: [address] [facility] Office  Phone: [phone]    Patient discharged time/date: 10/29/2021 11:33 AM  Patient discharge disposition:  Home or [place] or guarding. Neurologic: No focal neurological deficits. Musculoskeletal: Moves all extremities.     Hospital Course:   Malignant neoplasm of central portion of left breast in female, estrogen receptor positive (HCC)  S/P Bilateral nipple  sparing mastec Discharge Medications      START taking these medications      Instructions Prescription details   cephalexin 500 MG Caps  Commonly known as: KEFLEX      Take 1 capsule (500 mg total) by mouth 4 (four) times daily for 10 days.  Take and refill this antibi Magda Porras MD On 11/19/2021. Specialty: SURGERY, PLASTIC  Contact information:  [address] [place] [address]             Mary Cornell MD In 1 week.     Specialties: Family [facility] Follow the instructions on the drain sheet. · Wash your hands before and after emptying your drains  · Bring drain sheet with you to your first office visit    Bathing/Showers  · You can resume showers in 48 hours.   Before shower, take out all dressings f the bottle  · Drink a full glass of water with the medication    Home Medication  · Resume your home medications as instructed  · Do not resume herbal medications for two weeks    Diet  · Resume your normal diet    Activity  · No strenuous activity or heav Corina Pierre MD on 10/29/2021 11:04 AM

## (undated) NOTE — LETTER
24      Patient: Whitley Bhakta  : 1950 Visit date: 2024    Dear Gail,      I examined your patient in consultation today.    She fractured her right shoulder 8 months ago and was treated with a sling.    She has persistent edema and stiffness of the right hand.    Her symptoms are mostly due to arthritis within the right hand and wrist.  The hand is deconditioned and weak.  We have started her in therapy with active and passive range of motion, strengthening, endurance, and modalities.    The TFCC tear on MRI is asymptomatic.    She has Dupuytren's nodules in both palms, but these are asymptomatic.  Nothing need be done for these.    Thank you for your kind referral. If I may answer any questions, please feel free to contact me.     Sincerely,   Leon Mckeon MD     CC: Darvin Montalvo MD

## (undated) NOTE — LETTER
[address] [place] [address]  Authorization for Surgical Operation or Procedure  Date: ______________       Time: _______________  1.  I hereby authorize Dr. Mo Nicolas, my physician and the assistant, to perform the following operation an photographing of the operations or procedures to be performed for the purposes of advancing medicine, science, and/or education, provided my identity is not revealed.  If the procedure has been videotaped, the physician/surgeon will obtain the original vide treatment and any reasonable alternative to the proposed treatment. I have also explained the risks and benefits involved in the refusal of the proposed treatment and have answered the patient's questions.  If I have a significant financial interest in a co

## (undated) NOTE — IP AVS SNAPSHOT
[facility]            (For Outpatient Use Only) Initial Admit Date: 10/28/2021   Inpt/Obs Admit Date: Inpt: N/A / Obs: N/A   Discharge Date:    Hospital Acct:  [de-identified]   MRN: [de-identified]   CSN: [id number]   CEID: Adriana Buckley Payor:  Plan:    Group Number:  Insurance Type:    Subscriber Name:  Subscriber :    Subscriber ID:  Pt Rel to Subscriber:    Hospital Account Financial Class: [facility]    2021

## (undated) NOTE — LETTER
[address] [place] [address]      Authorization for Surgical Operation and Procedure     Date:___________                                                                                                         Time:_______ blood products. The following are some, but not all, of the potential risks that can occur: fever and allergic reactions, hemolytic reactions, transmission of diseases such as Hepatitis, AIDS and Cytomegalovirus (CMV) and fluid overload.   In the event cindy consent on my behalf). The surgeon or my attending physician will determine when the applicable recovery period ends for purposes of reinstating the DNAR order.   10. Patients having a sterilization procedure: I understand that if the procedure is successfu and had a discussion with my patient.     _______________________________________________________________ _____________________________  Beto Green)

## (undated) NOTE — LETTER
No referring provider defined for this encounter.        01/24/22        Patient: Barbra Stanley   YOB: 1950   Date of Visit: 1/24/2022       Dear  Dr. Alexia Troncoso MD,      I performed office cystoscopy and discuss further treatment explained the findings to the patient.       DISCUSSION ON FURTHER TREATMENT OPTIONS ---    After explaining to patient the findings of today's procedure, I had a separate discussion with patient concerning further treatment options of the following urologi Microhematuria  (primary encounter diagnosis)  Problem started 10/18/2021 UA RBC = 3-5. Most recent 12/15/2021 Cytology = Negative for high-grade urothelial carcinoma and UA RBC = 6-10.  On 01/05/2022 CT urogram(W+WO) = no tumors or stone of the urinary tra cancerous or precancerous and no further treatment is needed.    (K44.9) Hiatal hernia  01/05/2022 CT UROGRAM(W+WO) = small hiatal hernia. Patient complains of acid reflux which I explain is likely due to her mild hiatal hernia.  I advise and she agree to d make note of the mild degenerative arthritis reported in your spine and your hips and also within the joints and bones of the pelvis.

## (undated) NOTE — LETTER
[address]  [address] [place], [place]      Authorization for Surgical Operation and Procedure     Date:___________                                                                                                         Time:_______ a result of receiving a blood transfusion and/or blood products.   The following are some, but not all, of the potential risks that can occur: fever and allergic reactions, hemolytic reactions, transmission of diseases such as Hepatitis, AIDS and Cytomegalo explicitly stated by me (or a person authorized to consent on my behalf). The surgeon or my attending physician will determine when the applicable recovery period ends for purposes of reinstating the DNAR order.   10. Patients having a sterilization procedu used in this procedure/surgery, I have disclosed this and had a discussion with my patient.     _______________________________________________________________ _____________________________  Orin Speaks of Physician)